# Patient Record
Sex: MALE | Race: WHITE | NOT HISPANIC OR LATINO | Employment: FULL TIME | URBAN - METROPOLITAN AREA
[De-identification: names, ages, dates, MRNs, and addresses within clinical notes are randomized per-mention and may not be internally consistent; named-entity substitution may affect disease eponyms.]

---

## 2018-01-31 ENCOUNTER — ALLIED HEALTH/NURSE VISIT (OUTPATIENT)
Dept: NURSING | Facility: CLINIC | Age: 53
End: 2018-01-31
Payer: COMMERCIAL

## 2018-01-31 DIAGNOSIS — Z23 NEED FOR PROPHYLACTIC VACCINATION AND INOCULATION AGAINST INFLUENZA: Primary | ICD-10-CM

## 2018-01-31 PROCEDURE — 90686 IIV4 VACC NO PRSV 0.5 ML IM: CPT

## 2018-01-31 PROCEDURE — 90471 IMMUNIZATION ADMIN: CPT

## 2018-01-31 NOTE — MR AVS SNAPSHOT
"              After Visit Summary   2018    Michael Mills    MRN: [id number]           Patient Information     Date Of Birth          1965        Visit Information        Provider Department      2018 3:45 PM CR GOLD MA/LPN [facility]        Today's Diagnoses     Need for prophylactic vaccination and inoculation against influenza    -  1       Follow-ups after your visit        Who to contact     If you have questions or need follow up information about today's clinic visit or your schedule please contact [facility] directly at [phone].  Normal or non-critical lab and imaging results will be communicated to you by [employer], letter or phone within 4 business days after the clinic has received the results. If you do not hear from us within 7 days, please contact the clinic through [employer] or phone. If you have a critical or abnormal lab result, we will notify you by phone as soon as possible.  Submit refill requests through [employer] or call your pharmacy and they will forward the refill request to us. Please allow 3 business days for your refill to be completed.          Additional Information About Your Visit        MyChart Information     [employer] lets you send messages to your doctor, view your test results, renew your prescriptions, schedule appointments and more. To sign up, go to [email or web address] . Click on \"Log in\" on the left side of the screen, which will take you to the Welcome page. Then click on \"Sign up Now\" on the right side of the page.     You will be asked to enter the access code listed below, as well as some personal information. Please follow the directions to create your username and password.     Your access code is: [email or web address]  Expires: 2018  4:38 PM     Your access code will  in 90 days. If you need help or a new code, please call your [facility] or [phone].        Care EveryWhere ID     This is your Care " EveryWhere ID. This could be used by other organizations to access your [place] medical records  [id number]         Blood Pressure from Last 3 Encounters:   12/01/16 110/80   02/09/16 122/70   07/06/15 130/79    Weight from Last 3 Encounters:   12/01/16 146 lb (66.2 kg)   02/09/16 151 lb (68.5 kg)   07/06/15 147 lb (66.7 kg)              We Performed the Following     FLU VAC, SPLIT VIRUS IM > 3 YO (QUADRIVALENT) [01999]     Vaccine Administration, Initial [34335]        Primary Care Provider Office Phone # Fax #    Neymar Wesley [phone] [address] [facility] [address]        Equal Access to Services     ION PARKER : Hadjimbo trejoo Sotaina, waaxda luqadaha, qaybta kaalmada adeegyada, jose foreman . So [facility] [phone].    ATENCIÓN: Si habla español, tiene a whitaker disposición servicios gratuitos de asistencia lingüística. Llame al [phone].    We comply with applicable federal civil rights laws and [place] laws. We do not discriminate on the basis of race, color, national origin, age, disability, sex, sexual orientation, or gender identity.            Thank you!     Thank you for choosing [facility]  for your care. Our goal is always to provide you with excellent care. Hearing back from our patients is one way we can continue to improve our services. Please take a few minutes to complete the written survey that you may receive in the mail after your visit with us. Thank you!             Your Updated Medication List - Protect others around you: Learn how to safely use, store and throw away your medicines at www.disposemymeds.org.          This list is accurate as of 1/31/18  4:38 PM.  Always use your most recent med list.                   Brand Name Dispense Instructions for use Diagnosis    omeprazole 20 MG CR capsule    priLOSEC    30 capsule    Take 1 capsule (20 mg) by mouth daily    Gastroesophageal reflux disease,  esophagitis presence not specified

## 2018-01-31 NOTE — PROGRESS NOTES

## 2018-07-18 ENCOUNTER — HOSPITAL ENCOUNTER (EMERGENCY)
Facility: CLINIC | Age: 53
Discharge: HOME | End: 2018-07-18
Attending: EMERGENCY MEDICINE | Admitting: EMERGENCY MEDICINE
Payer: COMMERCIAL

## 2018-07-18 ENCOUNTER — APPOINTMENT (OUTPATIENT)
Dept: GENERAL RADIOLOGY | Facility: CLINIC | Age: 53
End: 2018-07-18
Attending: EMERGENCY MEDICINE
Payer: COMMERCIAL

## 2018-07-18 VITALS
TEMPERATURE: 98.2 F | RESPIRATION RATE: 16 BRPM | OXYGEN SATURATION: 98 % | DIASTOLIC BLOOD PRESSURE: 100 MMHG | SYSTOLIC BLOOD PRESSURE: 138 MMHG | HEART RATE: 68 BPM

## 2018-07-18 DIAGNOSIS — M25.531 RIGHT WRIST PAIN: ICD-10-CM

## 2018-07-18 PROCEDURE — 25000132 ZZH RX MED GY IP 250 OP 250 PS 637: Performed by: EMERGENCY MEDICINE

## 2018-07-18 PROCEDURE — 25000125 ZZHC RX 250: Performed by: EMERGENCY MEDICINE

## 2018-07-18 PROCEDURE — 99283 EMERGENCY DEPT VISIT LOW MDM: CPT

## 2018-07-18 PROCEDURE — 73110 X-RAY EXAM OF WRIST: CPT | Mod: RT

## 2018-07-18 RX ORDER — GABAPENTIN 300 MG/1
300 CAPSULE ORAL 3 TIMES DAILY
Qty: 90 CAPSULE | Refills: 0 | Status: SHIPPED | OUTPATIENT
Start: 2018-07-18 | End: 2018-10-16

## 2018-07-18 RX ORDER — OXYCODONE HYDROCHLORIDE 5 MG/1
5 TABLET ORAL ONCE
Status: COMPLETED | OUTPATIENT
Start: 2018-07-18 | End: 2018-07-18

## 2018-07-18 RX ORDER — GABAPENTIN 300 MG/1
300 CAPSULE ORAL ONCE
Status: COMPLETED | OUTPATIENT
Start: 2018-07-18 | End: 2018-07-18

## 2018-07-18 RX ORDER — OXYCODONE HYDROCHLORIDE 5 MG/1
5 TABLET ORAL EVERY 6 HOURS PRN
Qty: 12 TABLET | Refills: 0 | Status: SHIPPED | OUTPATIENT
Start: 2018-07-18 | End: 2018-10-16

## 2018-07-18 RX ORDER — PREDNISONE 20 MG/1
60 TABLET ORAL ONCE
Status: COMPLETED | OUTPATIENT
Start: 2018-07-18 | End: 2018-07-18

## 2018-07-18 RX ORDER — PREDNISONE 20 MG/1
40 TABLET ORAL DAILY
Qty: 10 TABLET | Refills: 0 | Status: SHIPPED | OUTPATIENT
Start: 2018-07-18 | End: 2021-09-08

## 2018-07-18 RX ORDER — ACETAMINOPHEN 500 MG
1000 TABLET ORAL ONCE
Status: COMPLETED | OUTPATIENT
Start: 2018-07-18 | End: 2018-07-18

## 2018-07-18 RX ORDER — ACETAMINOPHEN 500 MG
500-1000 TABLET ORAL EVERY 8 HOURS PRN
Qty: 1 TABLET | Refills: 0 | Status: SHIPPED | OUTPATIENT
Start: 2018-07-18 | End: 2018-07-28

## 2018-07-18 RX ADMIN — OXYCODONE HYDROCHLORIDE 5 MG: 5 TABLET ORAL at 20:42

## 2018-07-18 RX ADMIN — OXYCODONE HYDROCHLORIDE 5 MG: 5 TABLET ORAL at 21:23

## 2018-07-18 RX ADMIN — ACETAMINOPHEN 1000 MG: 500 TABLET, FILM COATED ORAL at 20:42

## 2018-07-18 RX ADMIN — GABAPENTIN 300 MG: 300 CAPSULE ORAL at 21:29

## 2018-07-18 RX ADMIN — PREDNISONE 60 MG: 20 TABLET ORAL at 20:12

## 2018-07-18 NOTE — LETTER
July 18, 2018      To Whom It May Concern:      Michael Mills was seen in our Emergency Department today, 07/18/18.  I expect his condition to improve over the next 1-2 days.  He may return to work when improved.    Sincerely,        Toya Kramer MD

## 2018-07-18 NOTE — ED AVS SNAPSHOT
[facility] Emergency Department    [address]    [facility] [address]    Phone:  [phone]    Fax:  [phone]                                       Michael Mills   MRN: [id number]    Department:  [facility] Emergency Department   Date of Visit:  7/18/2018           Patient Information     Date Of Birth          1965        Your diagnoses for this visit were:     Right wrist pain likely carpal tunnel syndrome       You were seen by Toya Kramer MD.      Follow-up Information     Follow up with Neymar Wesley MD In 2 days.    Specialty:  Family Practice    Contact information:    [address] [facility] [id number] [phone]          Follow up with [facility] In 1 week.    Contact information:    [address], [facility] [address]  [facility] [address] [phone]          Follow up with Taurus Estevez MD. Call in 1 day.    Specialty:  Internal Medicine    Contact information:    [facility]  [address]  [facility] [address] [phone]          Discharge Instructions       Discharge Instructions  Extremity Injury    You were seen today for an injury to an extremity (arm, hand, leg, or foot). You may have a bruise, strain, or fracture (broken bone).    Generally, every Emergency Department visit should have a follow-up clinic visit with either a primary or a specialty clinic/provider. Please follow-up as instructed by your emergency provider today.  Return to the Emergency Department right away if:    Your pain seems to change or get worse or there is pain in a new area that wasn t evaluated today.    Your extremity becomes pale, cool, blue, or numb or tingling past the injury.    You have more drainage, redness or pain in the area of the cut or abrasion.    You have pain that you cannot control with the medicine recommended or prescribed here, or you have pain that seems too much for your  injury.    Your child (who is injured) will not stop crying or is much more fussy than normal.    You have new symptoms or anything that worries you.    What to Expect:    Your swelling and pain may be worse the day after your injury, but should not be severe and should start getting better after that. You should not have new symptoms and your pain should not get worse.    You may start to get a bruise over the injured area or below the injured area (bruising can follow gravity).    Your movement and strength should get better with time.    Some injuries may not show up until after you have left the Emergency Department so it is important to follow-up as directed.    Your injury may prevent you from working.  Follow-up with your regular provider to get a work release note.    Pain medications or your injury may make it unsafe to drive or operate machinery.    Home Care:    RICE: Rest, Ice, Compression, Elevation  o Rest: Rest your injured area for at least 1-2 days. After that you may start using your extremity again as long as there is not too much pain.   o Ice: Apply ice your injured area for 15 minutes at a time, at least 3 times a day. Use a cloth between the ice bag and your skin to prevent frostbite. Do not sleep with an ice pack or heating pad on, since this can cause burns or skin injury.  o Compression: You may use an elastic bandage (Ace  Wrap) if it makes you more comfortable. Wrap it just tight enough to provide light compression, like a new pair of socks feels. Loosen the bandage if you have swelling past the bandage.  o Elevation: Raise the injured area above the level of your heart as much as possible in the first 1-2 days.      Use Tylenol  (acetaminophen), Motrin (ibuprofen), or Advil  (ibuprofen) for your pain unless you have an allergy or are told not to use these medications by your provider.  Take the medications as instructed on the package. Tylenol  (acetaminophen) is in many prescription  medicines and non-prescription medicines--check all of your medicines to be sure you aren t taking more than 3000 mg per day.    Please follow any other instructions that were discussed with you by your provider.    Stretching/Exercises:  You may have been provided with instructions for stretching or exercises. If your injury was to your arm or shoulder and your provider put you in a sling or an immobilizer, it is important that you take off your immobilizer within 3 days and stretch/move your shoulder, unless your provider specifically tells you to not move your shoulder.  This is to prevent further injury such as a  frozen shoulder .     If you were given a prescription for medicine here today, be sure to read all of the information (including the package insert) that comes with your prescription.  This will include important information about the medicine, its side effects, and any warnings that you need to know about.  The pharmacist who fills the prescription can provide more information and answer questions you may have about the medicine.  If you have questions or concerns that the pharmacist cannot address, please call or return to the Emergency Department.     Remember that you can always come back to the Emergency Department if you are not able to see your regular provider in the amount of time listed above, if you get any new symptoms, or if there is anything that worries you.    Opioid Medication Discharge Instructions    You have been given a prescription for an opioid (narcotic) pain medicine and/or have   received a pain medicine while here in the emergency department. These medicines can make you drowsy or impaired.     You must not drive, operate dangerous equipment, or   engage in any other dangerous activities while taking these medications. If you drive while taking these medications, you could be arrested for DUI, or driving under the   influence. Do not drink any alcohol while you are taking  these medications.     Opioid pain medications can cause addiction. If you have a history of chemical   dependency of any type, you are at a higher risk of becoming addicted to pain   medications. Only take these prescribed medications to treat your pain when all other   options have been tried. Take it for as short a time and as few doses as possible.     Store your pain pills in a secure place, as they are frequently stolen and provide a dangerous opportunity for children or visitors in your house to start abusing these powerful medications. We will not replace any lost or stolen medicine.     As soon as your pain is better, you should safely dispose of all your remaining medication.     Many prescription pain medications contain Tylenol (acetaminophen), including Vicodin, Tylenol #3, Norco, Lortab, and Percocet. You should not take any extra pills of Tylenol if you are using these prescription medications or you can get very sick. Do not ever take more than 4000 mg of acetaminophen in any 24 hour period.    All opioids tend to cause constipation. Drink plenty of water and eat foods that have   a lot of fiber, such as fruits, vegetables, prune juice, apple juice and high fiber cereal.   Take a laxative if you don t move your bowels at least every other day. Miralax, Milk of   Magnesia, Colace, or Senna can be used to keep you regular.        Discharge References/Attachments     CARPAL TUNNEL SYNDROME (ENGLISH)    GABAPENTIN CAPSULES OR TABLETS (ENGLISH)      24 Hour Appointment Hotline       To make an appointment at any [facility], call [phone] ([phone]). If you don't have a family doctor or clinic, we will help you find one. [facility] clinics are conveniently located to serve the needs of you and your family.             Review of your medicines      START taking        Dose / Directions Last dose taken    acetaminophen 500 MG tablet   Commonly known as:  TYLENOL   Dose:  500-1000 mg    Quantity:  1 tablet        Take 1-2 tablets (500-1,000 mg) by mouth every 8 hours as needed for mild pain   Refills:  0        gabapentin 300 MG capsule   Commonly known as:  NEURONTIN   Dose:  300 mg   Quantity:  90 capsule        Take 1 capsule (300 mg) by mouth 3 times daily   Refills:  0        oxyCODONE IR 5 MG tablet   Commonly known as:  ROXICODONE   Dose:  5 mg   Quantity:  12 tablet        Take 1 tablet (5 mg) by mouth every 6 hours as needed for pain   Refills:  0        predniSONE 20 MG tablet   Commonly known as:  DELTASONE   Dose:  40 mg   Quantity:  10 tablet        Take 2 tablets (40 mg) by mouth daily for 5 days   Refills:  0          Our records show that you are taking the medicines listed below. If these are incorrect, please call your family doctor or clinic.        Dose / Directions Last dose taken    omeprazole 20 MG CR capsule   Commonly known as:  priLOSEC   Dose:  20 mg   Quantity:  30 capsule        Take 1 capsule (20 mg) by mouth daily   Refills:  1                Information about OPIOIDS     PRESCRIPTION OPIOIDS: WHAT YOU NEED TO KNOW   We gave you an opioid (narcotic) pain medicine. It is important to manage your pain, but opioids are not always the best choice. You should first try all the other options your care team gave you. Take this medicine for as short a time (and as few doses) as possible.     These medicines have risks:    DO NOT drive when on new or higher doses of pain medicine. These medicines can affect your alertness and reaction times, and you could be arrested for driving under the influence (DUI). If you need to use opioids long-term, talk to your care team about driving.    DO NOT operate heave machinery    DO NOT do any other dangerous activities while taking these medicines.     DO NOT drink any alcohol while taking these medicines.      If the opioid prescribed includes acetaminophen, DO NOT take with any other medicines that contain acetaminophen. Read all  labels carefully. Look for the word  acetaminophen  or  Tylenol.  Ask your pharmacist if you have questions or are unsure.    You can get addicted to pain medicines, especially if you have a history of addiction (chemical, alcohol or substance dependence). Talk to your care team about ways to reduce this risk.    Store your pills in a secure place, locked if possible. We will not replace any lost or stolen medicine. If you don t finish your medicine, please throw away (dispose) as directed by your pharmacist. The [place] Pollution Control Agency has more information about safe disposal: https://www.pca.[facility].mn.us/living-green/managing-unwanted-medications.     All opioids tend to cause constipation. Drink plenty of water and eat foods that have a lot of fiber, such as fruits, vegetables, prune juice, apple juice and high-fiber cereal. Take a laxative (Miralax, milk of magnesia, Colace, Senna) if you don t move your bowels at least every other day.         Prescriptions were sent or printed at these locations (4 Prescriptions)                   Other Prescriptions                Printed at Department/Unit printer (4 of 4)         acetaminophen (TYLENOL) 500 MG tablet               gabapentin (NEURONTIN) 300 MG capsule               oxyCODONE IR (ROXICODONE) 5 MG tablet               predniSONE (DELTASONE) 20 MG tablet                Procedures and tests performed during your visit     XR Wrist Right G/E 3 Views      Orders Needing Specimen Collection     None      Pending Results     No orders found from 7/16/2018 to 7/19/2018.            Pending Culture Results     No orders found from 7/16/2018 to 7/19/2018.            Pending Results Instructions     If you had any lab results that were not finalized at the time of your Discharge, you can call the ED Lab Result RN at [phone]. You will be contacted by this team for any positive Lab results or changes in treatment. The nurses are available 7 days a week from  10A to 6:30P.  You can leave a message 24 hours per day and they will return your call.        Test Results From Your Hospital Stay        7/18/2018  8:35 PM      Narrative     RIGHT WRIST THREE VIEWS   7/18/2018 8:23 PM     HISTORY: Pain.    COMPARISON: 6/26/2014.        Impression     IMPRESSION: No visualized acute fracture, malalignment or other acute  osseous abnormality of the right wrist.    CHELY NARANJO MD                Clinical Quality Measure: Blood Pressure Screening     Your blood pressure was checked while you were in the emergency department today. The last reading we obtained was  BP: (!) 135/94 . Please read the guidelines below about what these numbers mean and what you should do about them.  If your systolic blood pressure (the top number) is less than 120 and your diastolic blood pressure (the bottom number) is less than 80, then your blood pressure is normal. There is nothing more that you need to do about it.  If your systolic blood pressure (the top number) is 120-139 or your diastolic blood pressure (the bottom number) is 80-89, your blood pressure may be higher than it should be. You should have your blood pressure rechecked within a year by a primary care provider.  If your systolic blood pressure (the top number) is 140 or greater or your diastolic blood pressure (the bottom number) is 90 or greater, you may have high blood pressure. High blood pressure is treatable, but if left untreated over time it can put you at risk for heart attack, stroke, or kidney failure. You should have your blood pressure rechecked by a primary care provider within the next 4 weeks.  If your provider in the emergency department today gave you specific instructions to follow-up with your doctor or provider even sooner than that, you should follow that instruction and not wait for up to 4 weeks for your follow-up visit.        Thank you for choosing [facility]       Thank you for choosing [facility] for your care.  "Our goal is always to provide you with excellent care. Hearing back from our patients is one way we can continue to improve our services. Please take a few minutes to complete the written survey that you may receive in the mail after you visit with us. Thank you!        [employer] Information     [employer] lets you send messages to your doctor, view your test results, renew your prescriptions, schedule appointments and more. To sign up, go to [email or web address] . Click on \"Log in\" on the left side of the screen, which will take you to the Welcome page. Then click on \"Sign up Now\" on the right side of the page.     You will be asked to enter the access code listed below, as well as some personal information. Please follow the directions to create your username and password.     Your access code is: [email or web address]  Expires: 10/16/2018  9:36 PM     Your access code will  in 90 days. If you need help or a new code, please call your [place] clinic or [phone].        Care EveryWhere ID     This is your Care EveryWhere ID. This could be used by other organizations to access your [place] medical records  [id number]        Equal Access to Services     ION PARKER AH: Barron Farias, wadaron cohen, qabubba kaalmabhargavi tanner, jose man. So [facility] [phone].    ATENCIÓN: Si habla español, tiene a whitaker disposición servicios gratuitos de asistencia lingüística. Llame al [phone].    We comply with applicable federal civil rights laws and [place] laws. We do not discriminate on the basis of race, color, national origin, age, disability, sex, sexual orientation, or gender identity.            After Visit Summary       This is your record. Keep this with you and show to your community pharmacist(s) and doctor(s) at your next visit.                  "

## 2018-07-18 NOTE — ED AVS SNAPSHOT
[facility] Emergency Department    [address]    [facility] [address]    Phone:  [phone]    Fax:  [phone]                                       Michael Mills   MRN: [id number]    Department:  [facility] Emergency Department   Date of Visit:  7/18/2018           After Visit Summary Signature Page     I have received my discharge instructions, and my questions have been answered. I have discussed any challenges I see with this plan with the nurse or doctor.    ..........................................................................................................................................  Patient/Patient Representative Signature      ..........................................................................................................................................  Patient Representative Print Name and Relationship to Patient    ..................................................               ................................................  Date                                            Time    ..........................................................................................................................................  Reviewed by Signature/Title    ...................................................              ..............................................  Date                                                            Time

## 2018-07-19 NOTE — DISCHARGE INSTRUCTIONS
Discharge Instructions  Extremity Injury    You were seen today for an injury to an extremity (arm, hand, leg, or foot). You may have a bruise, strain, or fracture (broken bone).    Generally, every Emergency Department visit should have a follow-up clinic visit with either a primary or a specialty clinic/provider. Please follow-up as instructed by your emergency provider today.  Return to the Emergency Department right away if:    Your pain seems to change or get worse or there is pain in a new area that wasn t evaluated today.    Your extremity becomes pale, cool, blue, or numb or tingling past the injury.    You have more drainage, redness or pain in the area of the cut or abrasion.    You have pain that you cannot control with the medicine recommended or prescribed here, or you have pain that seems too much for your injury.    Your child (who is injured) will not stop crying or is much more fussy than normal.    You have new symptoms or anything that worries you.    What to Expect:    Your swelling and pain may be worse the day after your injury, but should not be severe and should start getting better after that. You should not have new symptoms and your pain should not get worse.    You may start to get a bruise over the injured area or below the injured area (bruising can follow gravity).    Your movement and strength should get better with time.    Some injuries may not show up until after you have left the Emergency Department so it is important to follow-up as directed.    Your injury may prevent you from working.  Follow-up with your regular provider to get a work release note.    Pain medications or your injury may make it unsafe to drive or operate machinery.    Home Care:    RICE: Rest, Ice, Compression, Elevation  o Rest: Rest your injured area for at least 1-2 days. After that you may start using your extremity again as long as there is not too much pain.   o Ice: Apply ice your injured area for 15  minutes at a time, at least 3 times a day. Use a cloth between the ice bag and your skin to prevent frostbite. Do not sleep with an ice pack or heating pad on, since this can cause burns or skin injury.  o Compression: You may use an elastic bandage (Ace  Wrap) if it makes you more comfortable. Wrap it just tight enough to provide light compression, like a new pair of socks feels. Loosen the bandage if you have swelling past the bandage.  o Elevation: Raise the injured area above the level of your heart as much as possible in the first 1-2 days.      Use Tylenol  (acetaminophen), Motrin (ibuprofen), or Advil  (ibuprofen) for your pain unless you have an allergy or are told not to use these medications by your provider.  Take the medications as instructed on the package. Tylenol  (acetaminophen) is in many prescription medicines and non-prescription medicines--check all of your medicines to be sure you aren t taking more than 3000 mg per day.    Please follow any other instructions that were discussed with you by your provider.    Stretching/Exercises:  You may have been provided with instructions for stretching or exercises. If your injury was to your arm or shoulder and your provider put you in a sling or an immobilizer, it is important that you take off your immobilizer within 3 days and stretch/move your shoulder, unless your provider specifically tells you to not move your shoulder.  This is to prevent further injury such as a  frozen shoulder .     If you were given a prescription for medicine here today, be sure to read all of the information (including the package insert) that comes with your prescription.  This will include important information about the medicine, its side effects, and any warnings that you need to know about.  The pharmacist who fills the prescription can provide more information and answer questions you may have about the medicine.  If you have questions or concerns that the pharmacist  cannot address, please call or return to the Emergency Department.     Remember that you can always come back to the Emergency Department if you are not able to see your regular provider in the amount of time listed above, if you get any new symptoms, or if there is anything that worries you.    Opioid Medication Discharge Instructions    You have been given a prescription for an opioid (narcotic) pain medicine and/or have   received a pain medicine while here in the emergency department. These medicines can make you drowsy or impaired.     You must not drive, operate dangerous equipment, or   engage in any other dangerous activities while taking these medications. If you drive while taking these medications, you could be arrested for DUI, or driving under the   influence. Do not drink any alcohol while you are taking these medications.     Opioid pain medications can cause addiction. If you have a history of chemical   dependency of any type, you are at a higher risk of becoming addicted to pain   medications. Only take these prescribed medications to treat your pain when all other   options have been tried. Take it for as short a time and as few doses as possible.     Store your pain pills in a secure place, as they are frequently stolen and provide a dangerous opportunity for children or visitors in your house to start abusing these powerful medications. We will not replace any lost or stolen medicine.     As soon as your pain is better, you should safely dispose of all your remaining medication.     Many prescription pain medications contain Tylenol (acetaminophen), including Vicodin, Tylenol #3, Norco, Lortab, and Percocet. You should not take any extra pills of Tylenol if you are using these prescription medications or you can get very sick. Do not ever take more than 4000 mg of acetaminophen in any 24 hour period.    All opioids tend to cause constipation. Drink plenty of water and eat foods that have   a lot  of fiber, such as fruits, vegetables, prune juice, apple juice and high fiber cereal.   Take a laxative if you don t move your bowels at least every other day. Miralax, Milk of   Magnesia, Colace, or Senna can be used to keep you regular.

## 2018-07-19 NOTE — ED TRIAGE NOTES
Patient presents with complaints of right wrist pain that started yesterday. No known injury. Hx of arthritis. Patient states that he was seen here for similar pain about two years ago. ABC intact without need for intervention at this time.

## 2018-07-19 NOTE — ED PROVIDER NOTES
History     Chief Complaint:    Wrist Pain      HPI   Michael Mills is a 53 year old male who presents to the ED today for evaluation of wrist pain. The patient states that he began to have some pain to his right wrist that radiated to his fingers starting yesterday after returning to work from a vacation. He points to the volar surface of his wrist as the location of the pain and states that he has pains shooting into his fingers. He notes that the pain progressively got worse throughout the day today, which is why he presents to the ED today. He notes that he was seen here in the ED 2-3 years ago for similar symptoms and was ultimately diagnosed with rheumatoid arthritis and was discharged home with Prednisone. The patient reports that he has been taking Ibuprofen for his pain, and that this has not been helping his symptoms. He denies any recent injury or trauma to his hand. Of note, the patient states that he works a lot with his hand for his job, and had the increasing pain this week after coming back to work after being gone on vacation for a week. He has stopped taking all medications for his rheumatoid arthritis.     Allergies:  No known drug allergies.      Medications:    Prilosec      Past Medical History:    Hepatic Hemangioma   Plantar warts   Rheumatoid arthritis   Varicose veins of the lower extremities     Past Surgical History:    Varicose veins left side     Family History:    Thyroid disease   Hypertension   Cerebrovascular disease     Social History:  Marital Status:    Presents to the ED with son   Tobacco Use: never smoker   Alcohol Use: yes   PCP: Neymar Wesley     Review of Systems   Musculoskeletal:        Positive for right wrist pain.    All other systems reviewed and are negative.      Physical Exam     Patient Vitals for the past 24 hrs:   BP Temp Pulse Heart Rate Resp SpO2   07/18/18 2045 - - - - - 99 %   07/18/18 2044 (!) 135/94 - - - - -   07/18/18 1941 (!) 146/104 98.2   F (36.8  C) 74 74 18 99 %        Physical Exam  Nursing note and vitals reviewed.    Constitutional: Pleasant and well groomed.          HENT:    Eyes: Conjunctivae normal are normal. No scleral icterus.   Cardiovascular: Peripheral pulse with normal rate, regular rhythm. Intact distal pulses.   Pulmonary/Chest: Effort normal    Musculoskeletal: No swelling, warmth, or erythema. Significant tenderness to palpation over the carpal tunnel region. Light touch sensation intact. Distal cap refill intact. Pain over volar wrist with any movement of the wrist. No joint effusion.   Neurological:Alert. Coordination normal.   Skin: Skin is warm and dry.   Psychiatric: Normal mood and affect.      Emergency Department Course   Imaging:  Xray right wrist, 3 views   No visualized acute fracture, malalignment or other acute  osseous abnormality of the right wrist.  Report per radiology.   Radiographic findings were communicated with the patient who voiced understanding of the findings.    Interventions:  (2012) Prednisone 60 mg, PO   (2042) Tylenol 1,000 mg, PO   (2042) Roxicodone 5 mg, PO   (2123) Roxicodone 5 mg, PO    (2129) Neurontin 300 mg, PO       Emergency Department Course:  Nursing notes and vitals reviewed.  (2001) I performed an exam of the patient as documented above.    (2016) I rechecked on the patient and he informed me that he has not been taking his arthritis medication.   The patient was sent for a wrist xray while in the emergency department, findings above.   (2049) I rechecked on the patient and updated them on results.  He stated that the pain was still very severe, particularly the shooting pain to his fingers.   (2221) I rechecked on the patient. He states that he is feeling better and that he is ready to go home at this time.   Findings and plan explained to the patient. Patient discharged home with instructions regarding supportive care, medications, and reasons to return. The importance of close  follow-up was reviewed. The patient was prescribed Tylenol, Neurontin,  Roxicodone, and Prednisone.   Impression & Plan    Medical Decision Making:  Michael Mills is a 53 year old male who presents with two days of right wrist pain. He describes the pain in the volar surface of the wrist shooting into his fingers. Differential diagnosis included but is not limited to carpal tunnel syndrome, rheumatoid arthritis, less likely septic arthritis. He has a history of rheumatoid arthritis but his pain today seems more consistent with carpal tunnel syndrome and neuropathic pain going into the hand. He has no pain at all on the dorsum on the wrist with movement. All of the symptoms are on the volar aspect of the wrist and then extending in to his fingers. Had some difficulty controlling the symptoms, but ultimately he was feeling better. Recommended follow up with hand as well as PCP and rheumatologist as he has been lost to follow up. Will return with new or worsening symptoms, fever, swelling of the joint, redness, but otherwise close follow up. He received standard narcotic precautions. Also prescribed Gabapentin which he will titrate up to three times a day as tolerated.     Diagnosis:    ICD-10-CM    1. Right wrist pain M25.531     likely carpal tunnel syndrome       Disposition:  discharged to home    Discharge Medications:  New Prescriptions    ACETAMINOPHEN (TYLENOL) 500 MG TABLET    Take 1-2 tablets (500-1,000 mg) by mouth every 8 hours as needed for mild pain    GABAPENTIN (NEURONTIN) 300 MG CAPSULE    Take 1 capsule (300 mg) by mouth 3 times daily    OXYCODONE IR (ROXICODONE) 5 MG TABLET    Take 1 tablet (5 mg) by mouth every 6 hours as needed for pain    PREDNISONE (DELTASONE) 20 MG TABLET    Take 2 tablets (40 mg) by mouth daily for 5 days         Blanka CURTIS, ernesto serving as a scribe on 7/18/2018 at 8:01 PM to personally document services performed by Dr. Toya Kramer based on my observations and  the provider's statements to me.    7/18/2018   [facility] EMERGENCY DEPARTMENT       Toya Kramer MD  07/19/18 8040

## 2018-07-24 ENCOUNTER — TRANSFERRED RECORDS (OUTPATIENT)
Dept: HEALTH INFORMATION MANAGEMENT | Facility: CLINIC | Age: 53
End: 2018-07-24

## 2018-08-15 ENCOUNTER — TRANSFERRED RECORDS (OUTPATIENT)
Dept: HEALTH INFORMATION MANAGEMENT | Facility: CLINIC | Age: 53
End: 2018-08-15

## 2018-10-16 ENCOUNTER — OFFICE VISIT (OUTPATIENT)
Dept: FAMILY MEDICINE | Facility: CLINIC | Age: 53
End: 2018-10-16
Payer: COMMERCIAL

## 2018-10-16 VITALS
BODY MASS INDEX: 22.66 KG/M2 | RESPIRATION RATE: 16 BRPM | HEART RATE: 84 BPM | DIASTOLIC BLOOD PRESSURE: 89 MMHG | TEMPERATURE: 98.2 F | SYSTOLIC BLOOD PRESSURE: 129 MMHG | WEIGHT: 153 LBS | HEIGHT: 69 IN

## 2018-10-16 DIAGNOSIS — Z12.11 SPECIAL SCREENING FOR MALIGNANT NEOPLASMS, COLON: ICD-10-CM

## 2018-10-16 DIAGNOSIS — R12 HEARTBURN: ICD-10-CM

## 2018-10-16 DIAGNOSIS — L91.8 SKIN TAG: ICD-10-CM

## 2018-10-16 DIAGNOSIS — G56.03 BILATERAL CARPAL TUNNEL SYNDROME: ICD-10-CM

## 2018-10-16 DIAGNOSIS — M05.79 RHEUMATOID ARTHRITIS INVOLVING MULTIPLE SITES WITH POSITIVE RHEUMATOID FACTOR (H): ICD-10-CM

## 2018-10-16 DIAGNOSIS — I83.893 VARICOSE VEINS OF BOTH LOWER EXTREMITIES WITH COMPLICATIONS: ICD-10-CM

## 2018-10-16 DIAGNOSIS — Z00.00 ENCOUNTER FOR PREVENTIVE HEALTH EXAMINATION: Primary | ICD-10-CM

## 2018-10-16 DIAGNOSIS — Z23 NEED FOR PROPHYLACTIC VACCINATION AND INOCULATION AGAINST INFLUENZA: ICD-10-CM

## 2018-10-16 PROCEDURE — 99396 PREV VISIT EST AGE 40-64: CPT | Mod: 25 | Performed by: FAMILY MEDICINE

## 2018-10-16 PROCEDURE — 80061 LIPID PANEL: CPT | Performed by: FAMILY MEDICINE

## 2018-10-16 PROCEDURE — 90682 RIV4 VACC RECOMBINANT DNA IM: CPT | Performed by: FAMILY MEDICINE

## 2018-10-16 PROCEDURE — 36415 COLL VENOUS BLD VENIPUNCTURE: CPT | Performed by: FAMILY MEDICINE

## 2018-10-16 PROCEDURE — 80053 COMPREHEN METABOLIC PANEL: CPT | Performed by: FAMILY MEDICINE

## 2018-10-16 PROCEDURE — 17110 DESTRUCTION B9 LES UP TO 14: CPT | Performed by: FAMILY MEDICINE

## 2018-10-16 PROCEDURE — 90471 IMMUNIZATION ADMIN: CPT | Performed by: FAMILY MEDICINE

## 2018-10-16 ASSESSMENT — ENCOUNTER SYMPTOMS
COUGH: 0
JOINT SWELLING: 0
DYSURIA: 0
FREQUENCY: 0
PALPITATIONS: 0
SORE THROAT: 0
DIZZINESS: 0
PARESTHESIAS: 0
SHORTNESS OF BREATH: 0
MYALGIAS: 0
DIARRHEA: 0
CHILLS: 0
CONSTIPATION: 0
HEARTBURN: 1
ARTHRALGIAS: 0
NERVOUS/ANXIOUS: 0
WEAKNESS: 0
FEVER: 0
ABDOMINAL PAIN: 0
HEMATOCHEZIA: 0
HEADACHES: 0
NAUSEA: 0
HEMATURIA: 0
EYE PAIN: 0

## 2018-10-16 NOTE — MR AVS SNAPSHOT
After Visit Summary   10/16/2018    Michael Mills    MRN: [id number]           Patient Information     Date Of Birth          1965        Visit Information        Provider Department      10/16/2018 3:00 PM Neymar Wesley MD [facility]        Today's Diagnoses     Encounter for preventive health examination    -  1    Special screening for malignant neoplasms, colon        Rheumatoid arthritis involving multiple sites with positive rheumatoid factor (H)        Skin tag        Bilateral carpal tunnel syndrome        Varicose veins of both lower extremities with complications        Heartburn          Care Instructions      Preventive Health Recommendations  Male Ages 50 - 64    Yearly exam:             See your health care provider every year in order to  o   Review health changes.   o   Discuss preventive care.    o   Review your medicines if your doctor has prescribed any.     Have a cholesterol test every 5 years, or more frequently if you are at risk for high cholesterol/heart disease.     Have a diabetes test (fasting glucose) every three years. If you are at risk for diabetes, you should have this test more often.     Have a colonoscopy at age 50, or have a yearly FIT test (stool test). These exams will check for colon cancer.      Talk with your health care provider about whether or not a prostate cancer screening test (PSA) is right for you.    You should be tested each year for STDs (sexually transmitted diseases), if you re at risk.     Shots: Get a flu shot each year. Get a tetanus shot every 10 years.     Nutrition:    Eat at least 5 servings of fruits and vegetables daily.     Eat whole-grain bread, whole-wheat pasta and brown rice instead of white grains and rice.     Get adequate Calcium and Vitamin D.     Lifestyle    Exercise for at least 150 minutes a week (30 minutes a day, 5 days a week). This will help you control your weight and prevent disease.      Limit alcohol to one drink per day.     No smoking.     Wear sunscreen to prevent skin cancer.     See your dentist every six months for an exam and cleaning.     See your eye doctor every 1 to 2 years.  Light compression          Follow-ups after your visit        Additional Services     GASTROENTEROLOGY ADULT REF PROCEDURE ONLY       Last Lab Result: Creatinine (mg/dL)       Date                     Value                 02/01/2016               0.9              ----------  Body mass index is 22.59 kg/(m^2).     Needed:  No  Language:  English    Patient will be contacted to schedule procedure.     Please be aware that coverage of these services is subject to the terms and limitations of your health insurance plan.  Call member services at your health plan with any benefit or coverage questions.  Any procedures must be performed at a [facility] [facility] facility OR coordinated by your clinic's referral office.    Please bring the following with you to your appointment:    (1) Any X-Rays, CTs or MRIs which have been performed.  Contact the facility where they were done to arrange for  prior to your scheduled appointment.    (2) List of current medications   (3) This referral request   (4) Any documents/labs given to you for this referral                  Who to contact     If you have questions or need follow up information about today's clinic visit or your schedule please contact [facility] directly at [phone].  Normal or non-critical lab and imaging results will be communicated to you by MyChart, letter or phone within 4 business days after the clinic has received the results. If you do not hear from us within 7 days, please contact the clinic through MyChart or phone. If you have a critical or abnormal lab result, we will notify you by phone as soon as possible.  Submit refill requests through [employer] or call your pharmacy and they will forward the refill request to us. Please  "allow 3 business days for your refill to be completed.          Additional Information About Your Visit        Care EveryWhere ID     This is your Care EveryWhere ID. This could be used by other organizations to access your [facility] medical records  [id number]        Your Vitals Were     Pulse Temperature Respirations Height BMI (Body Mass Index)       84 98.2  F (36.8  C) (Oral) 16 5' 9\" (1.753 m) 22.59 kg/m2        Blood Pressure from Last 3 Encounters:   10/16/18 129/89   07/18/18 (!) 138/100   12/01/16 110/80    Weight from Last 3 Encounters:   10/16/18 153 lb (69.4 kg)   12/01/16 146 lb (66.2 kg)   02/09/16 151 lb (68.5 kg)              We Performed the Following     Comprehensive metabolic panel     GASTROENTEROLOGY ADULT REF PROCEDURE ONLY     Lipid panel reflex to direct LDL Non-fasting          Today's Medication Changes          These changes are accurate as of 10/16/18  3:36 PM.  If you have any questions, ask your nurse or doctor.               Start taking these medicines.        Dose/Directions    ranitidine 150 MG tablet   Commonly known as:  ZANTAC   Used for:  Heartburn   Started by:  Neymar Wesley MD        Dose:  150 mg   Take 1 tablet (150 mg) by mouth At Bedtime   Quantity:  90 tablet   Refills:  3         Stop taking these medicines if you haven't already. Please contact your care team if you have questions.     gabapentin 300 MG capsule   Commonly known as:  NEURONTIN   Stopped by:  Neymar Wesley MD           omeprazole 20 MG CR capsule   Commonly known as:  priLOSEC   Stopped by:  Neymar Wesley MD           oxyCODONE IR 5 MG tablet   Commonly known as:  ROXICODONE   Stopped by:  Neymar Wesley MD                Where to get your medicines      These medications were sent to [facility] [address] [facility] [address] AT [facility] [address]  [address] [facility] [address]     Phone:  [phone]     ranitidine 150 MG tablet                Primary " Care Provider Office Phone # Fax #    Neymar Wesley [phone] [address] [address] [facility] [address]        Equal Access to Services     VANDANAAGNES KEITH : Hadii rigoberto antony neilo Sofidencioali, waaxda luqadaha, qaybta kaalmada adealbertyada, jose holland larosannatj donovan. So [facility] [phone].    ATENCIÓN: Si habla español, tiene a whitaker disposición servicios gratuitos de asistencia lingüística. Llame al [phone].    We comply with applicable federal civil rights laws and [place] laws. We do not discriminate on the basis of race, color, national origin, age, disability, sex, sexual orientation, or gender identity.            Thank you!     Thank you for choosing [facility]  for your care. Our goal is always to provide you with excellent care. Hearing back from our patients is one way we can continue to improve our services. Please take a few minutes to complete the written survey that you may receive in the mail after your visit with us. Thank you!             Your Updated Medication List - Protect others around you: Learn how to safely use, store and throw away your medicines at www.disposemymeds.org.          This list is accurate as of 10/16/18  3:36 PM.  Always use your most recent med list.                   Brand Name Dispense Instructions for use Diagnosis    ranitidine 150 MG tablet    ZANTAC    90 tablet    Take 1 tablet (150 mg) by mouth At Bedtime    Heartburn

## 2018-10-16 NOTE — PATIENT INSTRUCTIONS
Preventive Health Recommendations  Male Ages 50 - 64    Yearly exam:             See your health care provider every year in order to  o   Review health changes.   o   Discuss preventive care.    o   Review your medicines if your doctor has prescribed any.     Have a cholesterol test every 5 years, or more frequently if you are at risk for high cholesterol/heart disease.     Have a diabetes test (fasting glucose) every three years. If you are at risk for diabetes, you should have this test more often.     Have a colonoscopy at age 50, or have a yearly FIT test (stool test). These exams will check for colon cancer.      Talk with your health care provider about whether or not a prostate cancer screening test (PSA) is right for you.    You should be tested each year for STDs (sexually transmitted diseases), if you re at risk.     Shots: Get a flu shot each year. Get a tetanus shot every 10 years.     Nutrition:    Eat at least 5 servings of fruits and vegetables daily.     Eat whole-grain bread, whole-wheat pasta and brown rice instead of white grains and rice.     Get adequate Calcium and Vitamin D.     Lifestyle    Exercise for at least 150 minutes a week (30 minutes a day, 5 days a week). This will help you control your weight and prevent disease.     Limit alcohol to one drink per day.     No smoking.     Wear sunscreen to prevent skin cancer.     See your dentist every six months for an exam and cleaning.     See your eye doctor every 1 to 2 years.  Light compression

## 2018-10-16 NOTE — LETTER
October 18, 2018      Michael Mills  [address]  [facility] [address]        Dear ,    We are writing to inform you of your test results.    Tests are all Pretty good.  Cholesterol is borderline.  We can start a medicine now, or you might want to consider that again in 5 years.  Risk increases with age   ANÍBAL JULES     Resulted Orders   Lipid panel reflex to direct LDL Non-fasting   Result Value Ref Range    Cholesterol 206 (H) <200 mg/dL      Comment:      Desirable:       <200 mg/dl    Triglycerides 238 (H) <150 mg/dL      Comment:      Borderline high:  150-199 mg/dl  High:             200-499 mg/dl  Very high:       >499 mg/dl  Non Fasting      HDL Cholesterol 39 (L) >39 mg/dL    LDL Cholesterol Calculated 119 (H) <100 mg/dL      Comment:      Above desirable:  100-129 mg/dl  Borderline High:  130-159 mg/dL  High:             160-189 mg/dL  Very high:       >189 mg/dl      Non HDL Cholesterol 167 (H) <130 mg/dL      Comment:      Above Desirable:  130-159 mg/dl  Borderline high:  160-189 mg/dl  High:             190-219 mg/dl  Very high:       >219 mg/dl     Comprehensive metabolic panel   Result Value Ref Range    Sodium 138 133 - 144 mmol/L    Potassium 4.3 3.4 - 5.3 mmol/L    Chloride 104 94 - 109 mmol/L    Carbon Dioxide 29 20 - 32 mmol/L    Anion Gap 5 3 - 14 mmol/L    Glucose 90 70 - 99 mg/dL      Comment:      Non Fasting    Urea Nitrogen 13 7 - 30 mg/dL    Creatinine 0.99 0.66 - 1.25 mg/dL    GFR Estimate 80 >60 mL/min/1.7m2      Comment:      Non  GFR Calc    GFR Estimate If Black >90 >60 mL/min/1.7m2      Comment:       GFR Calc    Calcium 9.0 8.5 - 10.1 mg/dL    Bilirubin Total 0.6 0.2 - 1.3 mg/dL    Albumin 3.7 3.4 - 5.0 g/dL    Protein Total 7.9 6.8 - 8.8 g/dL    Alkaline Phosphatase 67 40 - 150 U/L    ALT 30 0 - 70 U/L    AST 23 0 - 45 U/L       If you have any questions or concerns, please call the clinic at the number listed above.        Sincerely,        Neymar Wesley MD

## 2018-10-16 NOTE — PROGRESS NOTES
SUBJECTIVE:   CC: Michael Mills is an 53 year old male who presents for preventative health visit.     Physical   Annual:     Getting at least 3 servings of Calcium per day:  Yes    Bi-annual eye exam:  NO    Dental care twice a year:  Yes    Sleep apnea or symptoms of sleep apnea:  None    Diet:  Other    Frequency of exercise:  None    Taking medications regularly:  Yes    Medication side effects:  Other    Additional concerns today:  No    Bilateral carpal tunnel syndrome -Patient seen by ortho and PT, pain when pushing and pulling. Considering carpal tunnel surgery.     Varicose veins of both lower extremities with complications - Patient has tried thigh compression socks in the past and Hx of surgery. Bothersome after working long hours on feet. At moment no therapies. He hopes for cure in a single visit    Heartburn - Patient experience acid reflux after eating spicy foods. Tretaed with omeprazole in the past , benefit not remembered. No current therapies    Today's PHQ-2 Score:   PHQ-2 ( 1999 [employer]) 10/16/2018   Q1: Little interest or pleasure in doing things 0   Q2: Feeling down, depressed or hopeless 0   PHQ-2 Score 0   Q1: Little interest or pleasure in doing things Not at all   Q2: Feeling down, depressed or hopeless Not at all   PHQ-2 Score 0       Abuse: Current or Past(Physical, Sexual or Emotional)- No  Do you feel safe in your environment - Yes    Social History   Substance Use Topics     Smoking status: Never Smoker     Smokeless tobacco: Never Used     Alcohol use Yes      Comment: [id number]  on weekend     Alcohol Use 10/16/2018   If you drink alcohol do you typically have greater than 3 drinks per day OR greater than 7 drinks per week? No   No flowsheet data found.    Last PSA: No results found for: PSA    Reviewed orders with patient. Reviewed health maintenance and updated orders accordingly - Yes      Reviewed and updated as needed this visit by clinical staff  Tobacco  Allergies   Meds         Reviewed and updated as needed this visit by Provider        Past Medical History:   Diagnosis Date     CARDIOVASCULAR SCREENING; LDL GOAL LESS THAN 160 10/31/2010    3.7% 10 yr risk 2016      Hepatic hemangioma      Plantar warts 10/4/2013     Premature ejaculation      RA (rheumatoid arthritis) (H) 2014     SKIN SENSATION DISTURB 2006    rt  hand, cts  rt wrist      Snoring 10/4/2013     Varicose veins of lower extremities with complications 2006     Problem list name updated by automated process. Provider to review       Past Surgical History:   Procedure Laterality Date     varicose vein left side         Family History   Problem Relation Age of Onset     Thyroid Disease Mother      underactive      Hypertension Mother      Cerebrovascular Disease Father      Family History Negative Maternal Grandmother      Myocardial Infarction Maternal Grandfather      Family History Negative Paternal Grandmother      Family History Negative Paternal Grandfather      Family History Negative Sister      1     Family History Negative Brother      1- at age of 37-from cancer ?origin      Cancer - colorectal No family hx of      Prostate Cancer No family hx of    FHX - dad triple bypass 4 years ago  Uncle passed away from heart attack   Social History   Substance Use Topics     Smoking status: Never Smoker     Smokeless tobacco: Never Used     Alcohol use Yes      Comment: [id number]  on weekend       Review of Systems   Constitutional: Negative for chills and fever.   HENT: Negative for congestion, ear pain, hearing loss and sore throat.    Eyes: Positive for visual disturbance. Negative for pain.   Respiratory: Negative for cough and shortness of breath.    Cardiovascular: Negative for chest pain, palpitations and peripheral edema.   Gastrointestinal: Positive for heartburn. Negative for abdominal pain, constipation, diarrhea, hematochezia and nausea.   Genitourinary: Negative for  "discharge, dysuria, frequency, genital sores, hematuria, impotence and urgency.   Musculoskeletal: Negative for arthralgias, joint swelling and myalgias.   Skin: Negative for rash.   Neurological: Negative for dizziness, weakness, headaches and paresthesias.   Psychiatric/Behavioral: Negative for mood changes. The patient is not nervous/anxious.      CONSTITUTIONAL: NEGATIVE for fever, chills, change in weight  HENT: water brash in AM  INTEGUMENTARY/SKIN: POSITIVE for skin tags on L neck, wart on top of head, varicose veins on LE bilaterally L>R   CV: POSITIVE for chest pain, heart burn   male: negative for dysuria, hematuria, decreased urinary stream, erectile dysfunction, urethral discharge, infrequent nocturia   MUSCULOSKELETAL: POSITIVE for hand pain with work      OBJECTIVE:   /89 (BP Location: Left arm, Patient Position: Chair, Cuff Size: Adult Large)  Pulse 84  Temp 98.2  F (36.8  C) (Oral)  Resp 16  Ht 5' 9\" (1.753 m)  Wt 153 lb (69.4 kg)  BMI 22.59 kg/m2    Physical Exam  GENERAL: healthy, alert  EYES: Eyes grossly normal to inspection, PERRL and conjunctivae and sclerae normal  HENT: ear canals and TM's normal, nose and mouth without ulcers or lesions  RESP: lungs clear to auscultation - no rales, rhonchi or wheezes  CV: regular rate and rhythm, normal S1 S2, no S3 or S4, no murmur, click or rub, no peripheral edema and peripheral pulses strong  ABDOMEN: soft, nontender, no hepatosplenomegaly, no masses and bowel sounds normal  MS: no gross musculoskeletal defects noted, no edema Patch ov varicosities Medial L knee/calf, no brawny changes  SKIN: skin tags L neck, wart on head    Cryotherapy applied    Diagnostic Test Results:  No results found for this or any previous visit (from the past 24 hour(s)).    ASSESSMENT/PLAN:   (Z00.00) Encounter for preventive health examination  (primary encounter diagnosis)  Comment: completed  Plan: Lipid panel reflex to direct LDL Non-fasting,         " "Comprehensive metabolic panel    (M05.79) Rheumatoid arthritis involving multiple sites with positive rheumatoid factor (H)  Comment: in remission, recent Rheum visit, no therapies          (Z12.11) Special screening for malignant neoplasms, colon  Comment: refer  Plan: GASTROENTEROLOGY ADULT REF PROCEDURE ONLY         (L91.8) Skin tag  Comment: Cryotherapy applied  Neck and head Care discussed    (G56.03) Bilateral carpal tunnel syndrome  Comment: discussed. Recent ED  Visit reviewed    (I83.893) Varicose veins of both lower extremities with complications  Comment:  Discussed options, necessary visits. He will use use knee high socks, light compression first    (R12) Heartburn  Comment: untreated  Plan: ranitidine (ZANTAC) 150 MG tablet    (Z23) Need for prophylactic vaccination and inoculation against influenza  Comment: administered today   Plan: FLU VACCINE, (RIV4) RECOMBINANT HA  , IM         (FluBlok, egg free) [10477]- >18 YRS (FM         recommended  50-64 YRS), Vaccine         Administration, Initial [44872]           COUNSELING:   Reviewed preventive health counseling, as reflected in patient instructions       Immunizations    Vaccinated for: Influenza          BP Readings from Last 1 Encounters:   10/16/18 129/89     Estimated body mass index is 22.59 kg/(m^2) as calculated from the following:    Height as of this encounter: 5' 9\" (1.753 m).    Weight as of this encounter: 153 lb (69.4 kg).    BP Screening:   Last 3 BP Readings:    BP Readings from Last 3 Encounters:   10/16/18 129/89   07/18/18 (!) 138/100   12/01/16 110/80       The following was recommended to the patient:  Re-screen BP within a year and recommended lifestyle modifications       reports that he has never smoked. He has never used smokeless tobacco.      Counseling Resources:  ATP IV Guidelines  Pooled Cohorts Equation Calculator  FRAX Risk Assessment  ICSI Preventive Guidelines  Dietary Guidelines for Americans, 2010  USDA's " MyPlate  ASA Prophylaxis  Lung CA Screening    Neymar Wesley MD  [facility]  Answers for HPI/ROS submitted by the patient on 10/16/2018   PHQ-2 Score: 0

## 2018-10-17 LAB
ALBUMIN SERPL-MCNC: 3.7 G/DL (ref 3.4–5)
ALP SERPL-CCNC: 67 U/L (ref 40–150)
ALT SERPL W P-5'-P-CCNC: 30 U/L (ref 0–70)
ANION GAP SERPL CALCULATED.3IONS-SCNC: 5 MMOL/L (ref 3–14)
AST SERPL W P-5'-P-CCNC: 23 U/L (ref 0–45)
BILIRUB SERPL-MCNC: 0.6 MG/DL (ref 0.2–1.3)
BUN SERPL-MCNC: 13 MG/DL (ref 7–30)
CALCIUM SERPL-MCNC: 9 MG/DL (ref 8.5–10.1)
CHLORIDE SERPL-SCNC: 104 MMOL/L (ref 94–109)
CHOLEST SERPL-MCNC: 206 MG/DL
CO2 SERPL-SCNC: 29 MMOL/L (ref 20–32)
CREAT SERPL-MCNC: 0.99 MG/DL (ref 0.66–1.25)
GFR SERPL CREATININE-BSD FRML MDRD: 80 ML/MIN/1.7M2
GLUCOSE SERPL-MCNC: 90 MG/DL (ref 70–99)
HDLC SERPL-MCNC: 39 MG/DL
LDLC SERPL CALC-MCNC: 119 MG/DL
NONHDLC SERPL-MCNC: 167 MG/DL
POTASSIUM SERPL-SCNC: 4.3 MMOL/L (ref 3.4–5.3)
PROT SERPL-MCNC: 7.9 G/DL (ref 6.8–8.8)
SODIUM SERPL-SCNC: 138 MMOL/L (ref 133–144)
TRIGL SERPL-MCNC: 238 MG/DL

## 2018-10-18 NOTE — PROGRESS NOTES
Tests are all Pretty good.  Cholesterol is borderline.  We can start a medicine now, or you might want to consider that again in 5 years.  Risk increases with age   ANÍBAL JULES

## 2019-01-10 ENCOUNTER — TRANSFERRED RECORDS (OUTPATIENT)
Dept: HEALTH INFORMATION MANAGEMENT | Facility: CLINIC | Age: 54
End: 2019-01-10

## 2019-03-15 ENCOUNTER — TELEPHONE (OUTPATIENT)
Dept: FAMILY MEDICINE | Facility: CLINIC | Age: 54
End: 2019-03-15

## 2019-03-15 DIAGNOSIS — Z12.11 COLON CANCER SCREENING: Primary | ICD-10-CM

## 2019-03-15 NOTE — TELEPHONE ENCOUNTER
Panel Management Review      Patient has the following on his problem list: None      Composite cancer screening  Chart review shows that this patient is due/due soon for the following Colonoscopy  Summary:    Patient is due/failing the following:   COLONOSCOPY    Action needed:   Patient needs referral/order: colonoscopy    Type of outreach:    panel pool    Questions for provider review:    None                                                                                                                                    Stefania Barrera/RODDY  [place]       Chart routed to Care Team .

## 2019-03-25 ENCOUNTER — TELEPHONE (OUTPATIENT)
Dept: FAMILY MEDICINE | Facility: CLINIC | Age: 54
End: 2019-03-25

## 2019-04-09 ENCOUNTER — OFFICE VISIT (OUTPATIENT)
Dept: FAMILY MEDICINE | Facility: CLINIC | Age: 54
End: 2019-04-09
Payer: COMMERCIAL

## 2019-04-09 ENCOUNTER — ANCILLARY PROCEDURE (OUTPATIENT)
Dept: GENERAL RADIOLOGY | Facility: CLINIC | Age: 54
End: 2019-04-09
Attending: PHYSICIAN ASSISTANT
Payer: COMMERCIAL

## 2019-04-09 VITALS
DIASTOLIC BLOOD PRESSURE: 84 MMHG | WEIGHT: 148.6 LBS | HEART RATE: 82 BPM | HEIGHT: 68 IN | SYSTOLIC BLOOD PRESSURE: 124 MMHG | RESPIRATION RATE: 16 BRPM | OXYGEN SATURATION: 98 % | BODY MASS INDEX: 22.52 KG/M2 | TEMPERATURE: 98.4 F

## 2019-04-09 DIAGNOSIS — M05.79 RHEUMATOID ARTHRITIS INVOLVING MULTIPLE SITES WITH POSITIVE RHEUMATOID FACTOR (H): ICD-10-CM

## 2019-04-09 DIAGNOSIS — M79.642 PAIN OF LEFT HAND: ICD-10-CM

## 2019-04-09 DIAGNOSIS — M79.642 PAIN OF LEFT HAND: Primary | ICD-10-CM

## 2019-04-09 LAB — ERYTHROCYTE [SEDIMENTATION RATE] IN BLOOD BY WESTERGREN METHOD: 11 MM/H (ref 0–20)

## 2019-04-09 PROCEDURE — 36415 COLL VENOUS BLD VENIPUNCTURE: CPT | Performed by: PHYSICIAN ASSISTANT

## 2019-04-09 PROCEDURE — 85652 RBC SED RATE AUTOMATED: CPT | Performed by: PHYSICIAN ASSISTANT

## 2019-04-09 PROCEDURE — 84550 ASSAY OF BLOOD/URIC ACID: CPT | Performed by: PHYSICIAN ASSISTANT

## 2019-04-09 PROCEDURE — 73130 X-RAY EXAM OF HAND: CPT | Mod: LT

## 2019-04-09 PROCEDURE — 86140 C-REACTIVE PROTEIN: CPT | Performed by: PHYSICIAN ASSISTANT

## 2019-04-09 PROCEDURE — 99214 OFFICE O/P EST MOD 30 MIN: CPT | Performed by: PHYSICIAN ASSISTANT

## 2019-04-09 ASSESSMENT — MIFFLIN-ST. JEOR: SCORE: 1498.55

## 2019-04-09 NOTE — PATIENT INSTRUCTIONS
Patient Education     What is Rheumatoid Arthritis?  Rheumatoid arthritis (RA) is a disease that affects the synovium, the lining of the joints. This causes pain, swelling, and stiffness. Left untreated, rheumatoid arthritis may damage joints so badly that they no longer function. This disease appears most often in young-adult to middle-age women.      Rheumatoid arthritis affects the lining (synovium) of the joints, sometimes causing swelling.   What causes RA?  RA is an autoimmune disorder. This means the immune system, which normally protects the body, actually causes harm. In RA, the immune system attacks the joint lining. The reason for this is unknown. Researchers believe it is a combination of genes (what is inherited from parents) and environment (for example, having infections from viruses or bacteria). Also, people who smoke or are overweight have an increased risk of developing RA.  What are the symptoms of RA?  Rheumatoid arthritis can affect most joints. The hands, wrists, elbows, knees, and balls of the feet are common sites. This disease often affects the same joint on both sides of the body. Symptoms may include:    Tender, swollen inflamed joints. They may also look red and feel warm.    Stiff joints. Long periods of rest or using a joint too long or too hard can make stiffness worse. Morning stiffness lasting more than 30 minutes is very common.    Joints that have lost normal shape and motion.    Feeling tired.  How is RA diagnosed?  To diagnose rheumatoid arthritis, your healthcare provider will ask you a lot of questions about your health history and current symptoms. He or she will examine you, paying close attention to your joints. You will also have blood tests and X-rays. Your provider will likely recommend that you see a rheumatologist, an arthritis specialist.  Date Last Reviewed: 6/1/2018 2000-2018 [employer]. [address] [place] [address], [place] [address]. All rights  reserved. This information is not intended as a substitute for professional medical care. Always follow your healthcare professional's instructions.

## 2019-04-09 NOTE — PROGRESS NOTES
SUBJECTIVE:   Michael Mills is a 54 year old male who presents to clinic today for the following   health issues:    Finger Injury    Onset: Ongoing for 2 Months    Description:   Location: 2nd, 3rd, and 4th digits of Left Hand  Character: Sharp and Dull ache    Intensity: 6/10    Progression of Symptoms: same    Accompanying Signs & Symptoms:  Other symptoms: swelling and pain keeping patient up at night    History:   Previous similar pain: no       Precipitating factors:   Trauma or overuse: YES- Trauma-Pt had fallen on those fingers two months ago.     Alleviating factors:  Improved by: nothing    Therapies Tried and outcome: no therapies have been tried./         Additional history: as documented    Reviewed  and updated as needed this visit by clinical staff  Tobacco  Allergies  Meds  Med Hx  Surg Hx  Fam Hx  Soc Hx        Reviewed and updated as needed this visit by Provider  Tobacco  Allergies  Meds  Problems  Med Hx  Surg Hx  Fam Hx         Patient Active Problem List   Diagnosis     Varicose veins of lower extremities with complications     Premature ejaculation     CARDIOVASCULAR SCREENING; LDL GOAL LESS THAN 160     Snoring     Pain in limb     Plantar warts     RA (rheumatoid arthritis) (H)     Past Surgical History:   Procedure Laterality Date     varicose vein left side         Social History     Tobacco Use     Smoking status: Never Smoker     Smokeless tobacco: Never Used   Substance Use Topics     Alcohol use: Yes     Comment: [id number]  on weekend     Family History   Problem Relation Age of Onset     Thyroid Disease Mother         underactive      Hypertension Mother      Cerebrovascular Disease Father      Family History Negative Maternal Grandmother      Myocardial Infarction Maternal Grandfather      Family History Negative Paternal Grandmother      Family History Negative Paternal Grandfather      Family History Negative Sister         1     Family History Negative  "Brother         1- at age of 37-from cancer ?origin      Cancer - colorectal No family hx of      Prostate Cancer No family hx of          Current Outpatient Medications   Medication Sig Dispense Refill     ranitidine (ZANTAC) 150 MG tablet Take 1 tablet (150 mg) by mouth At Bedtime 90 tablet 3     Allergies   Allergen Reactions     No Known Drug Allergies      BP Readings from Last 3 Encounters:   19 124/84   10/16/18 129/89   18 (!) 138/100    Wt Readings from Last 3 Encounters:   19 67.4 kg (148 lb 9.6 oz)   10/16/18 69.4 kg (153 lb)   16 66.2 kg (146 lb)                    ROS:  Constitutional, msk, neuro, cardiovascular, pulmonary, gi and gu systems are negative, except as otherwise noted.    OBJECTIVE:     /84 (BP Location: Right arm, Patient Position: Chair, Cuff Size: Adult Regular)   Pulse 82   Temp 98.4  F (36.9  C) (Oral)   Resp 16   Ht 1.727 m (5' 8\")   Wt 67.4 kg (148 lb 9.6 oz)   SpO2 98%   BMI 22.59 kg/m    Body mass index is 22.59 kg/m .  GENERAL APPEARANCE: alert and no distress  ORTHO: L Wrist Exam: WRIST:  Inspection: no swelling  no effusion  Palpation: Tender: none   Non-tender: distal radius, distal ulna, scaphoid, lunate, triquetrum, hook of hamate, carpals  Range of Motion: flexion:  full, extension:  full, painful  Strength: Flexion:  5/5.   Extension:  5/5.    Special tests: negative Tinel's at carpal tunnel.        L Hand/Finger Exam: Inspection:Metacarpals:  slight swelling, warmth and erythema, Index Finger:  normal, Long Finger:  normal, RIng Finger:  normal, Small Finger:  normal  Tender: Metacarpals:  2nd metacarpal, 3rd metacarpal, 4th metacarpal  Non-tender: Metacarpals:  1st metacarpal, 5th metacarpal  Range of Motion Index Finger, ring finger, long finger:   flexion MCP   decreased, painful, extension MCP   full, flexion PIP   full, extension PIP  full, flexion DIP   full, extension DIP  full,  Strength: abduction and adduction 3/5 " diffusely.          PSYCH: mentation appears normal and affect normal/bright    Diagnostic Test Results:  Xray - left hand 3 views: negative     ASSESSMENT/PLAN:     (M79.592) Pain of left hand  (primary encounter diagnosis)  Comment: unclear of exact cause. Given GALO radiographs obtained to assess for bony changes. This appears negative. Does have history of RA. With erythema and warmth, likely differential. Will obtain esr and crp. If elevated, will consider prednisone and have follow up with his rheumatologist. Has not been on medication for years. Trauma likely the trigger. Uric acid obtained as well. If negative workup, MRI to be considered.   Plan: XR Hand Left G/E 3 Views, ESR: Erythrocyte         sedimentation rate, CRP inflammation, Uric acid            (M05.79) Rheumatoid arthritis involving multiple sites with positive rheumatoid factor (H)  Comment: as above   Plan:     Follow up: as above     Gustavo Martel PA-C  [facility]

## 2019-04-10 LAB
CRP SERPL-MCNC: 6.6 MG/L (ref 0–8)
URATE SERPL-MCNC: 4.4 MG/DL (ref 3.5–7.2)

## 2019-04-11 DIAGNOSIS — I00 ACUTE RHEUMATIC ARTHRITIS: Primary | ICD-10-CM

## 2019-04-11 RX ORDER — PREDNISONE 5 MG/1
TABLET ORAL
Qty: 84 TABLET | Refills: 0 | Status: SHIPPED | OUTPATIENT
Start: 2019-04-11 | End: 2019-06-24

## 2019-06-24 ENCOUNTER — HOSPITAL ENCOUNTER (OUTPATIENT)
Dept: CT IMAGING | Facility: CLINIC | Age: 54
Discharge: HOME | End: 2019-06-24
Attending: FAMILY MEDICINE | Admitting: FAMILY MEDICINE
Payer: COMMERCIAL

## 2019-06-24 ENCOUNTER — OFFICE VISIT (OUTPATIENT)
Dept: FAMILY MEDICINE | Facility: CLINIC | Age: 54
End: 2019-06-24
Payer: COMMERCIAL

## 2019-06-24 VITALS
DIASTOLIC BLOOD PRESSURE: 84 MMHG | BODY MASS INDEX: 22.81 KG/M2 | OXYGEN SATURATION: 96 % | WEIGHT: 150 LBS | SYSTOLIC BLOOD PRESSURE: 122 MMHG | TEMPERATURE: 98.5 F | HEART RATE: 85 BPM

## 2019-06-24 DIAGNOSIS — K21.9 GASTROESOPHAGEAL REFLUX DISEASE, ESOPHAGITIS PRESENCE NOT SPECIFIED: Primary | ICD-10-CM

## 2019-06-24 DIAGNOSIS — R10.31 RLQ ABDOMINAL PAIN: ICD-10-CM

## 2019-06-24 DIAGNOSIS — M77.8 RIGHT SHOULDER TENDONITIS: ICD-10-CM

## 2019-06-24 LAB
ERYTHROCYTE [DISTWIDTH] IN BLOOD BY AUTOMATED COUNT: 12.7 % (ref 10–15)
HCT VFR BLD AUTO: 49.6 % (ref 40–53)
HGB BLD-MCNC: 16.9 G/DL (ref 13.3–17.7)
MCH RBC QN AUTO: 30.8 PG (ref 26.5–33)
MCHC RBC AUTO-ENTMCNC: 34.1 G/DL (ref 31.5–36.5)
MCV RBC AUTO: 90 FL (ref 78–100)
PLATELET # BLD AUTO: 191 10E9/L (ref 150–450)
RBC # BLD AUTO: 5.49 10E12/L (ref 4.4–5.9)
WBC # BLD AUTO: 12.9 10E9/L (ref 4–11)

## 2019-06-24 PROCEDURE — 25000128 H RX IP 250 OP 636: Performed by: FAMILY MEDICINE

## 2019-06-24 PROCEDURE — 36415 COLL VENOUS BLD VENIPUNCTURE: CPT | Performed by: FAMILY MEDICINE

## 2019-06-24 PROCEDURE — 99214 OFFICE O/P EST MOD 30 MIN: CPT | Performed by: FAMILY MEDICINE

## 2019-06-24 PROCEDURE — 74177 CT ABD & PELVIS W/CONTRAST: CPT

## 2019-06-24 PROCEDURE — 85027 COMPLETE CBC AUTOMATED: CPT | Performed by: FAMILY MEDICINE

## 2019-06-24 PROCEDURE — 25000125 ZZHC RX 250: Performed by: FAMILY MEDICINE

## 2019-06-24 RX ORDER — IOPAMIDOL 755 MG/ML
75 INJECTION, SOLUTION INTRAVASCULAR ONCE
Status: COMPLETED | OUTPATIENT
Start: 2019-06-24 | End: 2019-06-24

## 2019-06-24 RX ADMIN — SODIUM CHLORIDE 63 ML: 9 INJECTION, SOLUTION INTRAVENOUS at 19:59

## 2019-06-24 RX ADMIN — IOPAMIDOL 75 ML: 755 INJECTION, SOLUTION INTRAVENOUS at 20:00

## 2019-06-24 NOTE — PROGRESS NOTES
Subjective     Michael Mills is a 54 year old male who presents to clinic today for the following health issues:    He has lower abdominal pain for the last 2 days and it is going to his back.   It feels like he has to have a allen movement and he is bloated.   His last BM was today.   He goes about 3 times per day.   There is no blood in his stool.  It has been slightly loose.  He is feeling chilled but no fever.   He still has his appendix.      Also the right shoulder is hurting for about 1.5 days.   It is shooting pain.   He did not hurt it that he can think of.   It was hard to sleep.   He is taking tylenol for the pain.       History of Present Illness        He eats 0-1 servings of fruits and vegetables daily.He consumes 0 sweetened beverage(s) daily.  He is taking medications regularly.     ABDOMINAL   PAIN     Onset: 2 days    Description:   Character: Sharp  Location: right lower quadrant left lower quadrant  Radiation:  Back    Intensity: 10/10    Progression of Symptoms:  same    Accompanying Signs & Symptoms:  Fever/Chills?: no   Gas/Bloating: YES  Nausea: no   Vomitting: no   Diarrhea?: no   Constipation:no   Dysuria or Hematuria: a little pain    History:   Trauma: no  Previous similar pain: YES - gallstone 30 years ago  Previous tests done: none    Precipitating factors:   Does the pain change with:     Food: YES     BM: YES    Urination: YES    Alleviating factors:      Therapies Tried and outcome: tylenol - doesn't help    LMP:  not applicable     PROBLEMS TO ADD ON...  Right upper arm/shoulder shooting pain started yesterday.    Needs a work release note     Past Medical History:   Diagnosis Date     CARDIOVASCULAR SCREENING; LDL GOAL LESS THAN 160 10/31/2010    3.7% 10 yr risk 2016      Hepatic hemangioma      Plantar warts 10/4/2013     Premature ejaculation      RA (rheumatoid arthritis) (H) 12/31/2014     SKIN SENSATION DISTURB 12/25/2006    rt  hand, cts  rt wrist      Snoring 10/4/2013      Varicose veins of lower extremities with complications 2006     Problem list name updated by automated process. Provider to review       Past Surgical History:   Procedure Laterality Date     varicose vein left side         MEDICATIONS:  Current Outpatient Medications   Medication     ranitidine (ZANTAC) 150 MG tablet     No current facility-administered medications for this visit.        SOCIAL HISTORY:  Social History     Tobacco Use     Smoking status: Never Smoker     Smokeless tobacco: Never Used   Substance Use Topics     Alcohol use: Yes     Comment: [id number]-1-6  on weekend       Family History   Problem Relation Age of Onset     Thyroid Disease Mother         underactive      Hypertension Mother      Cerebrovascular Disease Father      Family History Negative Maternal Grandmother      Myocardial Infarction Maternal Grandfather      Family History Negative Paternal Grandmother      Family History Negative Paternal Grandfather      Family History Negative Sister         1     Family History Negative Brother         1- at age of 37-from cancer ?origin      Cancer - colorectal No family hx of      Prostate Cancer No family hx of               Review of Systems   ROS COMP: Constitutional, HEENT, cardiovascular, pulmonary, gi and gu systems are negative, except as otherwise noted.      Objective    /84 (BP Location: Right arm, Patient Position: Sitting, Cuff Size: Adult Regular)   Pulse 85   Temp 98.5  F (36.9  C) (Oral)   Wt 68 kg (150 lb)   SpO2 96%   BMI 22.81 kg/m    Body mass index is 22.81 kg/m .  Physical Exam   GENERAL: healthy, alert, some moderate distress and over weight  EYES: Eyes grossly normal to inspection, PERRL and conjunctivae and sclerae normal  NECK: no adenopathy, no asymmetry, masses, or scars and thyroid normal to palpation  RESP: lungs clear to auscultation - no rales, rhonchi or wheezes  CV: regular rate and rhythm, normal S1 S2, no S3 or S4, no murmur, click  or rub, no peripheral edema and peripheral pulses strong  ABDOMEN: no organomegaly or masses, bowel sounds normal and tender throughout but more below umbilicus and most in right lower quadrant with some rebound  MS: no gross musculoskeletal defects noted, no edema  SKIN: no suspicious lesions or rashes  NEURO: Normal strength and tone, mentation intact and speech normal  PSYCH: mentation appears normal, affect normal/bright  Right Shoulder exam:  There is no swelling, redness or gross deformity of the shoulder joints.  There is full range of motion with some pain in the midarch.  The empty can sign is NEG.  The impingement sign is +.  The rotater cuff strength is 5/5 with some pain with internal rotation and some with external rotation.      Diagnostic Test Results:  Labs reviewed in [facility]        Assessment:  1. Acute abdominal pain worst in the right lower quadrant but in all of lower abdomin with some rebound - ? appy vs diverticulitis - has not had colonoscopy  2. gastroesophageal reflux disease - zantac did not work very well  3. Right shoulder pain - acute - was lifting but did not feel injury - ? Deltoid tendonitis - tender in this area    Plan:  1. Ice tid for shoulder and tylenol  2. NO NSAIDS right now  3. See [facility] orders for labs  4. Will get abdominal and pelvic CT to rule out appy and diverticulitis  5. Will try omeprazole for gastroesophageal reflux disease   6. Due for physical in 3 months - should have colonoscopy this year  7. If diverticulitis will treat with clear liquid diet for 2 days and follow up in 2 days and  cipro 500 mg every 12 hours and flagyl 500 mg every 8 hours for 10 days

## 2019-06-26 ENCOUNTER — OFFICE VISIT (OUTPATIENT)
Dept: FAMILY MEDICINE | Facility: CLINIC | Age: 54
End: 2019-06-26
Payer: COMMERCIAL

## 2019-06-26 VITALS
RESPIRATION RATE: 12 BRPM | WEIGHT: 150 LBS | SYSTOLIC BLOOD PRESSURE: 120 MMHG | BODY MASS INDEX: 22.81 KG/M2 | DIASTOLIC BLOOD PRESSURE: 79 MMHG | TEMPERATURE: 97.9 F | HEART RATE: 69 BPM | OXYGEN SATURATION: 98 %

## 2019-06-26 DIAGNOSIS — Z12.11 SPECIAL SCREENING FOR MALIGNANT NEOPLASMS, COLON: Primary | ICD-10-CM

## 2019-06-26 PROCEDURE — 99214 OFFICE O/P EST MOD 30 MIN: CPT | Performed by: FAMILY MEDICINE

## 2019-06-26 RX ORDER — METRONIDAZOLE 500 MG/1
500 TABLET ORAL 3 TIMES DAILY
Qty: 30 TABLET | Refills: 0 | COMMUNITY
Start: 2019-06-24 | End: 2019-08-26

## 2019-06-26 RX ORDER — CIPROFLOXACIN 500 MG/1
500 TABLET, FILM COATED ORAL 2 TIMES DAILY
Qty: 20 TABLET | Refills: 0 | COMMUNITY
Start: 2019-06-24 | End: 2019-08-26

## 2019-06-26 NOTE — LETTER
[facility]  [address] [place], [address]  [phone]        June 26, 2019    RE: Michael Mills                                                                                                                                                       [address] [place] [id number]            To Whom It May Concern,   Michael Mills is a patient of mine.   He has been ill and was in to see me on Monday, June 24th.   He will need to be excused from work for that day and Tuesday, June 25th.             Sincerely,    Lucinda Badillo M.D.

## 2019-06-26 NOTE — PROGRESS NOTES
Subjective     Michael Mills is a 54 year old male who presents to clinic today for the following health issues:    He was seen on Monday for abdominal pain and sent to hospital for CT.   Acute diverticulitis was found on the left.   He has had cipro and flagyl for 2 days and is feeling better.   He has also been on liquid diet.   He does not have fever.       His right shoulder continues to hurt and the right pec is hurting too when he moves his shoulder.   He thinks he may have injured it lifting heavy pail.   He needs note for work due to missing on Monday and Tuesday.       HPI   Concern - Recheck Abdominal pain  Onset: X 5 days    Description:     Recheck abdominal pain, lower abdomen.  This has improved with anti biotics.  He has since completed at CT abdomen and would like to review his results.       Intensity: mild    Progression of Symptoms:  improving    Accompanying Signs & Symptoms:  Right shoulder pain that started 6/23/19 has now moved to more of right side of chest.  Denies any SOB, but it is noticeable when breathing, can't lay on that side when sleeping     Previous history of similar problem:   none    Precipitating factors:   Worsened by: none    Alleviating factors:  Improved by: Anti Biotics    Therapies Tried and outcome: Anti Biotics seem to be working.       Past Medical History:   Diagnosis Date     CARDIOVASCULAR SCREENING; LDL GOAL LESS THAN 160 10/31/2010    3.7% 10 yr risk 2016      Hepatic hemangioma      Plantar warts 10/4/2013     Premature ejaculation      RA (rheumatoid arthritis) (H) 12/31/2014     SKIN SENSATION DISTURB 12/25/2006    rt  hand, cts  rt wrist      Snoring 10/4/2013     Varicose veins of lower extremities with complications 12/25/2006     Problem list name updated by automated process. Provider to review       Past Surgical History:   Procedure Laterality Date     varicose vein left side         MEDICATIONS:  Current Outpatient Medications   Medication      ciprofloxacin (CIPRO) 500 MG tablet     metroNIDAZOLE (FLAGYL) 500 MG tablet     omeprazole (PRILOSEC) 20 MG DR capsule     No current facility-administered medications for this visit.        SOCIAL HISTORY:  Social History     Tobacco Use     Smoking status: Never Smoker     Smokeless tobacco: Never Used   Substance Use Topics     Alcohol use: Yes     Comment: [id number]  on weekend       Family History   Problem Relation Age of Onset     Thyroid Disease Mother         underactive      Hypertension Mother      Cerebrovascular Disease Father      Family History Negative Maternal Grandmother      Myocardial Infarction Maternal Grandfather      Family History Negative Paternal Grandmother      Family History Negative Paternal Grandfather      Family History Negative Sister         1     Family History Negative Brother         1- at age of 37-from cancer ?origin      Cancer - colorectal No family hx of      Prostate Cancer No family hx of                     Review of Systems   ROS COMP: Constitutional, HEENT, cardiovascular, pulmonary, gi and gu systems are negative, except as otherwise noted.      Objective    /79 (BP Location: Right arm, Patient Position: Chair, Cuff Size: Adult Regular)   Pulse 69   Temp 97.9  F (36.6  C) (Oral)   Resp 12   Wt 68 kg (150 lb)   SpO2 98%   BMI 22.81 kg/m    Body mass index is 22.81 kg/m .  Physical Exam   GENERAL: healthy, alert and no distress  EYES: Eyes grossly normal to inspection, PERRL and conjunctivae and sclerae normal  HENT: ear canals and TM's normal, nose and mouth without ulcers or lesions  NECK: no adenopathy, no asymmetry, masses, or scars and thyroid normal to palpation  RESP: lungs clear to auscultation - no rales, rhonchi or wheezes  CV: regular rate and rhythm, normal S1 S2, no S3 or S4, no murmur, click or rub, no peripheral edema and peripheral pulses strong  ABDOMEN: soft, nontender, no hepatosplenomegaly, no masses and bowel sounds  normal  MS: no gross musculoskeletal defects noted, no edema  SKIN: no suspicious lesions or rashes  NEURO: Normal strength and tone, mentation intact and speech normal  PSYCH: mentation appears normal, affect normal/bright    Diagnostic Test Results:  Labs reviewed in Epic        Assessment:  1. Diverticulitis - improving  2. Right shoulder tendonitis - ? Worse - maybe due to cipro  3. Will be traveling soon [place] to [facility]      Plan:  1. Refer to travel clinic  2. Finish 8 more days abx  3. No alcohol while on abx  4. May go to regular diet  5. Tylenol for shoulder pain  6. Follow up in 1-2 months for shoulder and if still hurting will get MRI  7. Colonoscopy ordered for 1-2 months from now

## 2019-07-24 ENCOUNTER — OFFICE VISIT (OUTPATIENT)
Dept: FAMILY MEDICINE | Facility: CLINIC | Age: 54
End: 2019-07-24
Payer: COMMERCIAL

## 2019-07-24 VITALS
HEIGHT: 68 IN | TEMPERATURE: 98.3 F | WEIGHT: 149 LBS | OXYGEN SATURATION: 99 % | DIASTOLIC BLOOD PRESSURE: 78 MMHG | BODY MASS INDEX: 22.58 KG/M2 | RESPIRATION RATE: 16 BRPM | SYSTOLIC BLOOD PRESSURE: 117 MMHG | HEART RATE: 88 BPM

## 2019-07-24 DIAGNOSIS — L23.7 CONTACT DERMATITIS DUE TO POISON IVY: Primary | ICD-10-CM

## 2019-07-24 PROCEDURE — 99213 OFFICE O/P EST LOW 20 MIN: CPT | Performed by: PHYSICIAN ASSISTANT

## 2019-07-24 RX ORDER — PREDNISONE 20 MG/1
TABLET ORAL
Qty: 42 TABLET | Refills: 0 | Status: SHIPPED | OUTPATIENT
Start: 2019-07-24 | End: 2019-08-26

## 2019-07-24 RX ORDER — TRIAMCINOLONE ACETONIDE 0.25 MG/G
OINTMENT TOPICAL 3 TIMES DAILY
Qty: 80 G | Refills: 0 | Status: SHIPPED | OUTPATIENT
Start: 2019-07-24 | End: 2019-08-26

## 2019-07-24 ASSESSMENT — MIFFLIN-ST. JEOR: SCORE: 1500.36

## 2019-07-24 NOTE — PROGRESS NOTES
Subjective     Michael Mills is a 54 year old male who presents to clinic today for the following health issues:    HPI   Rash  Onset: 2 days     Description:   Location: Arms, waistline and neck   Character: raised, painful, burning, red  Itching (Pruritis): YES    Progression of Symptoms:  worsening    Accompanying Signs & Symptoms:  Fever: yes chilly yesterday   Body aches or joint pain: no   Sore throat symptoms: no   Recent cold symptoms: no     History:   Previous similar rash: no     Precipitating factors:   Exposure to similar rash: no   New exposures: grasses and trees- was working in garden and pulling weeds- thought this started after brushing up against cucumber   Recent travel: no     Alleviating factors:      Therapies Tried and outcome: shower and benadryl       Patient Active Problem List   Diagnosis     Varicose veins of lower extremities with complications     Premature ejaculation     CARDIOVASCULAR SCREENING; LDL GOAL LESS THAN 160     Snoring     Pain in limb     Plantar warts     RA (rheumatoid arthritis) (H)     Past Surgical History:   Procedure Laterality Date     varicose vein left side         Social History     Tobacco Use     Smoking status: Never Smoker     Smokeless tobacco: Never Used   Substance Use Topics     Alcohol use: Yes     Comment: [id number]-[id number]  on weekend     Family History   Problem Relation Age of Onset     Thyroid Disease Mother         underactive      Hypertension Mother      Cerebrovascular Disease Father      Family History Negative Maternal Grandmother      Myocardial Infarction Maternal Grandfather      Family History Negative Paternal Grandmother      Family History Negative Paternal Grandfather      Family History Negative Sister         1     Family History Negative Brother         1- at age of 37-from cancer ?origin      Cancer - colorectal No family hx of      Prostate Cancer No family hx of          Current Outpatient Medications   Medication Sig  "Dispense Refill     predniSONE (DELTASONE) 20 MG tablet Take 3 pills in the morning for 1 week. Then take 2 pills in the morning for 1 week. Then take 1 pill in the morning for 1 week. 42 tablet 0     triamcinolone (KENALOG) 0.025 % external ointment Apply topically 3 times daily for 14 days 80 g 0     ciprofloxacin (CIPRO) 500 MG tablet Take 1 tablet (500 mg) by mouth 2 times daily 20 tablet 0     metroNIDAZOLE (FLAGYL) 500 MG tablet Take 1 tablet (500 mg) by mouth 3 times daily 30 tablet 0     omeprazole (PRILOSEC) 20 MG DR capsule Take 1 capsule (20 mg) by mouth daily 30 capsule 1     Allergies   Allergen Reactions     No Known Drug Allergies          Reviewed and updated as needed this visit by Provider         Review of Systems   ROS COMP: Constitutional, HEENT, cardiovascular, pulmonary, gi and gu systems are negative, except as otherwise noted.      Objective    /78 (BP Location: Right arm, Patient Position: Chair, Cuff Size: Adult Regular)   Pulse 88   Temp 98.3  F (36.8  C) (Oral)   Resp 16   Ht 1.727 m (5' 8\")   Wt 67.6 kg (149 lb)   SpO2 99%   BMI 22.66 kg/m    Body mass index is 22.66 kg/m .       Physical Exam   GENERAL: healthy, alert and no distress  EYES: Eyes grossly normal to inspection, PERRL and conjunctivae and sclerae normal  MS: no gross musculoskeletal defects noted, no edema  SKIN: Rash consisting of erythema, some urticaria, and some vesicles on bilateral arms and trunk ( worse on flanks). Erythematous base.  NEURO: Normal strength and tone, mentation intact and speech normal  PSYCH: mentation appears normal, affect normal/bright    Diagnostic Test Results:  none         Assessment & Plan     (L23.7) Contact dermatitis due to poison ivy  (primary encounter diagnosis)    Comment: Possible contact with poison ivy given appearance and severity of rash. Could be other irritant. Treat as poison ivy with 3 week taper of prednisone. Continue benadryl and can use topical steroid as " "needed for itchiness on small areas only.    Plan: predniSONE (DELTASONE) 20 MG tablet,         triamcinolone (KENALOG) 0.025 % external         ointment                 Patient Instructions   Take the complete course of the steroid.    Use kenalog ointment 3 times a day as needed for itchiness.    Use Benadryl as needed.          Patient Education     Contact Dermatitis  Contact dermatitis is a skin rash caused by something that touches the skin and makes it irritated and inflamed. Your skin may be red, swollen, dry, and may be cracked. Blisters may form and ooze. The rash will itch.  Contact dermatitis can form on the face and neck, backs of hands, forearms, genitals, and lower legs.  People can get contact dermatitis from lots of sources. These include:    Plants such as poison ivy, oak, or sumac    Chemicals in hair dyes and rinses, soaps, solvents, waxes, fingernail polish, and deodorants     Jewelry or watchbands made of nickel  Contact dermatitis is not passed from person to person.  Talk with your healthcare provider about what may have caused the rash. A type of allergy testing called \"patch testing\" may be used to discover what you are allergic to. You will need to avoid the source of your rash in the future to prevent it from coming back.  Treatment is done to relieve itching and prevent the rash from coming back. The rash should go away in a few days to a few weeks.  Home care  Your healthcare provider may prescribe medicine to relieve swelling and itching. Follow all instructions when using these medicines.  General care:    Avoid anything that heats up your skin, such as hot showers or baths, or direct sunlight. This can make itching worse.    Apply cold compresses to soothe your sores to help relieve your symptoms. Do this for 30 minutes 3 to 4 times a day. You can make a cold compress by soaking a cloth in cold water. Squeeze out excess water. You can add colloidal oatmeal to the water to help reduce " itching. For severe itching in a small area, apply an ice pack wrapped in a thin towel. Do this for 20 minutes 3 to 4 times a day.    You can also try wet dressings. One way to do this is to wear a wet piece of clothing under a dry one. Wear a damp shirt under a dry shirt if your upper body is affected. This can relieve itching and prevent you from scratching the affected area.    You can also help relieve large areas of itching by taking a lukewarm bath with colloidal oatmeal added to the water.    Use hydrocortisone cream for redness and irritation, unless another medicine was prescribed. You can also use benzocaine anesthetic cream or spray. Calamine lotion can also relieve mild symptoms.    Use oral diphenhydramine to help reduce itching. You can buy this antihistamine at drug and grocery stores. It can make you sleepy, so use lower doses during the daytime. Or you can use loratadine. This is an antihistamine that will not make you sleepy. Do not use diphenhydramine if you have glaucoma or have trouble urinating due to an enlarged prostate.    If a plant causes your rash, make sure to wash your skin and the clothes you were wearing when you came into contact with the plant. This is to wash away the plant oils that gave you the rash and prevent more or worse symptoms.    Stay away from the substance or object that causes your symptoms. If you can t avoid it, wear gloves or some other type of protection.  Follow-up care  Follow up with your healthcare provider, or as advised.  When to seek medical advice  Call your healthcare provider right away if any of these occur:    Spreading of the rash to other parts of your body    Severe swelling of your face, eyelids, mouth, throat or tongue    Trouble urinating due to swelling in the genital area    Fever of 100.4 F (38 C) or higher    Redness or swelling that gets worse    Pain that gets worse    Foul-smelling fluid leaking from the skin    Yellow-brown crusts on the  open blisters  Date Last Reviewed: 9/1/2016 2000-2018 [employer], [employer]. [address] [place] [address], [place] [address]. All rights reserved. This information is not intended as a substitute for professional medical care. Always follow your healthcare professional's instructions.               No follow-ups on file.    Juan Carlos Mora PA-C  [facility]

## 2019-07-24 NOTE — PATIENT INSTRUCTIONS
"Take the complete course of the steroid.    Use kenalog ointment 3 times a day as needed for itchiness.    Use Benadryl as needed.          Patient Education     Contact Dermatitis  Contact dermatitis is a skin rash caused by something that touches the skin and makes it irritated and inflamed. Your skin may be red, swollen, dry, and may be cracked. Blisters may form and ooze. The rash will itch.  Contact dermatitis can form on the face and neck, backs of hands, forearms, genitals, and lower legs.  People can get contact dermatitis from lots of sources. These include:    Plants such as poison ivy, oak, or sumac    Chemicals in hair dyes and rinses, soaps, solvents, waxes, fingernail polish, and deodorants     Jewelry or watchbands made of nickel  Contact dermatitis is not passed from person to person.  Talk with your healthcare provider about what may have caused the rash. A type of allergy testing called \"patch testing\" may be used to discover what you are allergic to. You will need to avoid the source of your rash in the future to prevent it from coming back.  Treatment is done to relieve itching and prevent the rash from coming back. The rash should go away in a few days to a few weeks.  Home care  Your healthcare provider may prescribe medicine to relieve swelling and itching. Follow all instructions when using these medicines.  General care:    Avoid anything that heats up your skin, such as hot showers or baths, or direct sunlight. This can make itching worse.    Apply cold compresses to soothe your sores to help relieve your symptoms. Do this for 30 minutes 3 to 4 times a day. You can make a cold compress by soaking a cloth in cold water. Squeeze out excess water. You can add colloidal oatmeal to the water to help reduce itching. For severe itching in a small area, apply an ice pack wrapped in a thin towel. Do this for 20 minutes 3 to 4 times a day.    You can also try wet dressings. One way to do this is to " wear a wet piece of clothing under a dry one. Wear a damp shirt under a dry shirt if your upper body is affected. This can relieve itching and prevent you from scratching the affected area.    You can also help relieve large areas of itching by taking a lukewarm bath with colloidal oatmeal added to the water.    Use hydrocortisone cream for redness and irritation, unless another medicine was prescribed. You can also use benzocaine anesthetic cream or spray. Calamine lotion can also relieve mild symptoms.    Use oral diphenhydramine to help reduce itching. You can buy this antihistamine at drug and grocery stores. It can make you sleepy, so use lower doses during the daytime. Or you can use loratadine. This is an antihistamine that will not make you sleepy. Do not use diphenhydramine if you have glaucoma or have trouble urinating due to an enlarged prostate.    If a plant causes your rash, make sure to wash your skin and the clothes you were wearing when you came into contact with the plant. This is to wash away the plant oils that gave you the rash and prevent more or worse symptoms.    Stay away from the substance or object that causes your symptoms. If you can t avoid it, wear gloves or some other type of protection.  Follow-up care  Follow up with your healthcare provider, or as advised.  When to seek medical advice  Call your healthcare provider right away if any of these occur:    Spreading of the rash to other parts of your body    Severe swelling of your face, eyelids, mouth, throat or tongue    Trouble urinating due to swelling in the genital area    Fever of 100.4 F (38 C) or higher    Redness or swelling that gets worse    Pain that gets worse    Foul-smelling fluid leaking from the skin    Yellow-brown crusts on the open blisters  Date Last Reviewed: 9/1/2016 2000-2018 [employer]. [address] [place] [address], [place] [address]. All rights reserved. This information is not intended as a  substitute for professional medical care. Always follow your healthcare professional's instructions.

## 2019-08-16 ENCOUNTER — HOSPITAL ENCOUNTER (OUTPATIENT)
Facility: CLINIC | Age: 54
Discharge: HOME | End: 2019-08-16
Attending: INTERNAL MEDICINE | Admitting: INTERNAL MEDICINE
Payer: COMMERCIAL

## 2019-08-16 VITALS
DIASTOLIC BLOOD PRESSURE: 94 MMHG | SYSTOLIC BLOOD PRESSURE: 124 MMHG | HEART RATE: 85 BPM | HEIGHT: 68 IN | WEIGHT: 139 LBS | RESPIRATION RATE: 12 BRPM | BODY MASS INDEX: 21.07 KG/M2 | OXYGEN SATURATION: 97 %

## 2019-08-16 LAB — COLONOSCOPY: NORMAL

## 2019-08-16 PROCEDURE — G0500 MOD SEDAT ENDO SERVICE >5YRS: HCPCS | Performed by: INTERNAL MEDICINE

## 2019-08-16 PROCEDURE — 45378 DIAGNOSTIC COLONOSCOPY: CPT | Performed by: INTERNAL MEDICINE

## 2019-08-16 PROCEDURE — G0105 COLORECTAL SCRN; HI RISK IND: HCPCS | Performed by: INTERNAL MEDICINE

## 2019-08-16 PROCEDURE — 25000128 H RX IP 250 OP 636: Performed by: INTERNAL MEDICINE

## 2019-08-16 RX ORDER — ONDANSETRON 2 MG/ML
4 INJECTION INTRAMUSCULAR; INTRAVENOUS
Status: DISCONTINUED | OUTPATIENT
Start: 2019-08-16 | End: 2019-08-16 | Stop reason: HOSPADM

## 2019-08-16 RX ORDER — FENTANYL CITRATE 50 UG/ML
INJECTION, SOLUTION INTRAMUSCULAR; INTRAVENOUS PRN
Status: DISCONTINUED | OUTPATIENT
Start: 2019-08-16 | End: 2019-08-16 | Stop reason: HOSPADM

## 2019-08-16 RX ORDER — LIDOCAINE 40 MG/G
CREAM TOPICAL
Status: DISCONTINUED | OUTPATIENT
Start: 2019-08-16 | End: 2019-08-16 | Stop reason: HOSPADM

## 2019-08-16 ASSESSMENT — MIFFLIN-ST. JEOR: SCORE: 1455

## 2019-08-16 NOTE — DISCHARGE INSTRUCTIONS
DIVERTICULOSIS  You have diverticulosis. This means that small pouches have formed in the wall of the colon (large intestine). Most often, this problem causes no symptoms. But the pouches in the colon are at risk for becoming infected or inflamed. When this happens, the condition is called diverticulitis.  The doctor will talk with you about how to manage your condition. Diet changes may be all that are needed to help control diverticulosis and prevent progression to diverticulitis. If you develop diverticulitis, other treatments will likely be needed.  HOME CARE  Medications: You may be told to take fiber supplements daily. Fiber adds bulk to the stool so that it passes through the colon more easily. Stool softeners may be recommended. You may also be given medications for pain relief. Be sure to take all medications as directed.  General Care:    Eat unprocessed foods that are high in fiber. Whole grains, fruits, and vegetables are good choices.    Drink 6 to 8 glasses of water daily.    Watch for changes in your bowel movements. Tell the doctor if you notice any changes.    Begin an exercise program. Ask your doctor how to get started.    Get plenty of rest and sleep.  FOLLOW UP as advised by the doctor or our staff. Regular visits may be needed to check on your health. Be sure to keep all your appointments.  GET PROMPT MEDICAL ATTENTION if any of the following occurs:    Fever of 100.6 F (38 C) or higher, or as directed by your healthcare provider    Severe cramps in the lower left side of the abdomen or pain that is getting progressively worse.    Tenderness in the lower left side of the abdomen or worsening pain throughout the abdomen    Diarrhea or constipation that does not improve within 24 hours    Nausea and vomiting    Bleeding from the rectum      [id number] [facility], [address] [place] [address], [place] [address]. All rights reserved. This information is not intended as a substitute for professional  medical care. Always follow your healthcare professional's instructions.    Eating a High-Fiber Diet  Fiber is what gives strength and structure to plants. Most grains, beans, vegetables, and fruits contain fiber. Foods rich in fiber are often low in calories and fat, and they fill you up more. They may also reduce your risks for certain health problems. To find out the amount of fiber in canned, packaged, or frozen foods, read the  Nutrition Facts  label. It tells you how much fiber is in a serving.      Types of Fiber and Their Benefits  There are two types of fiber: insoluble and soluble. They both aid digestion and help you maintain a healthy weight.  Insoluble fiber: This is found in whole grains, cereals, certain fruits and vegetables (such as apple skin, corn, and carrots). Insoluble fiber may prevent constipation and reduce the risk of certain types of cancer.   Soluble fiber: This type of fiber is in oats, beans, and certain fruits and vegetables (such as strawberries and peas). Soluble fiber can reduce cholesterol (which may help lower the risk of heart disease), and helps control blood sugar levels.  Look for High-Fiber Foods  Whole-grain breads and cereals: Try to eat 6-8 ounces a day. Include wheat and oat bran cereals, whole-wheat muffins or toast, and corn tortillas in your meals.  Fruits: Try to eat 2 cups a day. Apples, oranges, strawberries, pears, and bananas are good sources. (Note: Fruit juice is low in fiber.)  Vegetables: Try to eat 3 cups a day. Add asparagus, carrots, broccoli, peas, and corn to your meals.  Legumes (beans): One cup of cooked lentils gives you over 15 grams of fiber. Try navy beans, lentils, and chickpeas.  Seeds:  A small handful of seeds gives you about 3 grams of fiber. Try sunflower seeds.    Keep Track of Your Fiber  A healthy diet includes 31 grams of fiber a day if you have a 2,000-calorie diet. Keep track of how much fiber you eat. Start by reading food labels. Then  eat a variety of foods high in fiber. Ask your doctor about supplemental fiber products.            [id number] [employer], [address] [facility], [place] [address]. All rights reserved. This information is not intended as a substitute for professional medical care. Always follow your healthcare professional's instructions.    HIGH FIBER DIET  Fiber is present in all fruits, vegetables, cereals and grains. Fiber passes through the body undigested. A high fiber diet helps food move through the intestinal tract. The added bulk is helpful in preventing constipation. In people with diverticulosis it serves to clean out the pouches along the colon wall while preventing new ones from forming. A high fiber diet also reduces the risk of colon cancer, decreases blood cholesterol and prevents high blood sugar in people with diabetes.    The foods listed below are high in fiber and should be included in your diet. If you are not used to high fiber foods, start with 1 or 2 foods from this list. Every 3-4 days add a new one to your diet until you are eating 4 high fiber foods per day. This should give you 20-35 Gm of fiber/day. It is also important to drink a lot of water when you are on this diet (6-8 glasses a day). Water causes the fiber to swell and increases the benefit.    FOODS HIGH IN DIETARY FIBER:  BREADS: Made with 100% whole wheat flour; zabrina, wheat or rye crackers; tortillas, bran muffins  CEREALS: Whole grain cereal with bran (Chex, Raisin Bran, Corn Bran), oatmeal, rolled oats, granola, wheat flakes, brown rice  NUTS: Any nuts  FRUITS: All fresh fruits along with edible skins, (bananas, citrus fruit, mangoes, pears, prunes, raisins, apples, pineapple, apricot, melon, jams and marmalades), fruit juices (especially prune juice)  VEGETABLES: All types, preferably raw or lightly cooked: especially, celery, eggplant, potatoes, spinach, broccoli, brussel sprouts, winter squash, carrots, cauliflower, soybeans,  lentils, fresh and dried beans of all kinds  OTHER: Popcorn, any spices      [id number] [facility], [address] [place] [address], [place] [address]. All rights reserved. This information is not intended as a substitute for professional medical care. Always follow your healthcare professional's instructions.

## 2019-08-16 NOTE — H&P
Pre-Endoscopy History and Physical     Michael Mills MRN# [id number]   YOB: 1965 Age: 54 year old     Date of Procedure: 8/16/2019  Primary care provider: Neymar Wesley  Type of Endoscopy: Colonoscopy with possible biopsy, possible polypectomy  Reason for Procedure: screen  Type of Anesthesia Anticipated: Conscious Sedation    HPI:    Michael is a 54 year old male who will be undergoing the above procedure.      A history and physical has been performed. The patient's medications and allergies have been reviewed. The risks and benefits of the procedure and the sedation options and risks were discussed with the patient.  All questions were answered and informed consent was obtained.      He denies a personal or family history of anesthesia complications or bleeding disorders.     Patient Active Problem List   Diagnosis     Varicose veins of lower extremities with complications     Premature ejaculation     CARDIOVASCULAR SCREENING; LDL GOAL LESS THAN 160     Snoring     Pain in limb     Plantar warts     RA (rheumatoid arthritis) (H)        Past Medical History:   Diagnosis Date     CARDIOVASCULAR SCREENING; LDL GOAL LESS THAN 160 10/31/2010    3.7% 10 yr risk 2016      Hepatic hemangioma      Plantar warts 10/4/2013     Premature ejaculation      RA (rheumatoid arthritis) (H) 12/31/2014     SKIN SENSATION DISTURB 12/25/2006    rt  hand, cts  rt wrist      Snoring 10/4/2013     Varicose veins of lower extremities with complications 12/25/2006     Problem list name updated by automated process. Provider to review        Past Surgical History:   Procedure Laterality Date     varicose vein left side         Social History     Tobacco Use     Smoking status: Never Smoker     Smokeless tobacco: Never Used   Substance Use Topics     Alcohol use: Yes     Comment: [id number]  on weekend       Family History   Problem Relation Age of Onset     Thyroid Disease Mother         underactive      Hypertension  "Mother      Cerebrovascular Disease Father      Family History Negative Maternal Grandmother      Myocardial Infarction Maternal Grandfather      Family History Negative Paternal Grandmother      Family History Negative Paternal Grandfather      Family History Negative Sister         1     Family History Negative Brother         1- at age of 37-from cancer ?origin      Cancer - colorectal No family hx of      Prostate Cancer No family hx of        Prior to Admission medications    Medication Sig Start Date End Date Taking? Authorizing Provider   ciprofloxacin (CIPRO) 500 MG tablet Take 1 tablet (500 mg) by mouth 2 times daily 19   Lucinda Badillo MD   metroNIDAZOLE (FLAGYL) 500 MG tablet Take 1 tablet (500 mg) by mouth 3 times daily 19   Lucinda Badillo MD   omeprazole (PRILOSEC) 20 MG DR capsule Take 1 capsule (20 mg) by mouth daily 19   Lucinda Badillo MD   predniSONE (DELTASONE) 20 MG tablet Take 3 pills in the morning for 1 week. Then take 2 pills in the morning for 1 week. Then take 1 pill in the morning for 1 week. 19   Juan Carlos Mora PA-C   triamcinolone (KENALOG) 0.025 % external ointment Apply topically 3 times daily for 14 days 19  Juan Carlos Mora PA-C       Allergies   Allergen Reactions     No Known Drug Allergies         REVIEW OF SYSTEMS:   5 point ROS negative except as noted above in HPI, including Gen., Resp., CV, GI &  system review.    PHYSICAL EXAM:   There were no vitals taken for this visit. Estimated body mass index is 22.66 kg/m  as calculated from the following:    Height as of 19: 1.727 m (5' 8\").    Weight as of 19: 67.6 kg (149 lb).   GENERAL APPEARANCE: alert, and oriented  MENTAL STATUS: alert  AIRWAY EXAM: Mallampatti Class I (visualization of the soft palate, fauces, uvula, anterior and posterior pillars)  RESP: lungs clear to auscultation - no rales, rhonchi or wheezes  CV: regular rates and rhythm  DIAGNOSTICS:    Not " indicated    IMPRESSION   ASA Class 2 - Mild systemic disease    PLAN:   Plan for Colonoscopy with possible biopsy, possible polypectomy. We discussed the risks, benefits and alternatives and the patient wished to proceed.    The above has been forwarded to the consulting provider.      Signed Electronically by: Esa Malin  August 16, 2019

## 2019-08-16 NOTE — LETTER
July 25, 2019      Michael Rodriguezsangeetarudolph   [address] [place] [id number]        Thank you for choosing [facility]. You are scheduled for the following service(s).   Please be aware that coverage of these services is subject to the terms and limitations of your health insurance plan.  Call member services at your health plan with any benefit or coverage questions.    Date:  Friday August 16, 2019             Procedure:  COLONOSCOPY  Doctor:        Dr Malin   Arrival Time:  0800  *Check in at Emergency/Endoscopy desk*  Procedure Time:  0830      Location:   [facility]        Endoscopy Department, First Floor (Enter through ER Doors) *        [address] [place], [place] [address]      [phone] or [phone] ([facility]) to reschedule      MIRALAX -GATORADE  PREP  Colonoscopy is the most accurate test to detect colon polyps and colon cancer; and the only test where polyps can be removed. During this procedure, a doctor examines the lining of your large intestine and rectum through a flexible tube.   Transportation  You must arrange for a ride for the day of your procedure with a responsible adult. A taxi , Uber, etc, is not an option unless you are accompanied by a responsible adult. If you fail to arrange transportation with a responsible adult, your procedure will be cancelled and rescheduled.    Purchase the  following supplies at your local pharmacy:  - 2 (two) bisacodyl tablets: each tablet contains 5 mg.  (Dulcolax  laxative NOT Dulcolax  stool softener)   - 1 (one) 8.3 oz bottle of Polyethylene Glycol (PEG) 3350 Powder   (MiraLAX , Smooth LAX , ClearLAX  or equivalent)  - 64 oz Gatorade    Regular Gatorade, Gatorade G2 , Powerade , Powerade Zero  or Pedialyte  is acceptable. Red colored flavors are not allowed; all other colors (yellow, green, orange, purple and blue) are okay. It is also okay to buy two 2.12 oz packets of powdered Gatorade that can  be mixed with water to a total volume of 64 oz of liquid.  - 1 (one) 10 oz bottle of Magnesium Citrate (Red colored flavors are not allowed)  It is also okay for you to use a 0.5 oz package of powdered magnesium citrate (17 g) mixed with 10 oz of water.      PREPARATION FOR COLONOSCOPY    7 days before:    Discontinue fiber supplements and medications containing iron. This includes Metamucil  and Fibercon ; and multivitamins with iron.    3 days before:    Begin a low-fiber diet. A low-fiber diet helps making the cleanout more effective.     Examples of a low-fiber diet include (but are not limited to): white bread, white rice, pasta, crackers, fish, chicken, eggs, ground beef, creamy peanut butter, cooked/steamed/boiled vegetables, canned fruit, bananas, melons, milk, plain yogurt cheese, salad dressing and other condiments.     The following are not allowed on a low-fiber diet: seeds, nuts, popcorn, bran, whole wheat, corn, quinoa, raw fruits and vegetables, berries and dried fruit, beans and lentils.    For additional details on low-fiber diet, please refer to the table on the last page.    2 days before:    Continue the low-fiber diet.     Drink at least 8 glasses of water throughout the day.     Stop eating solid foods at 11:45 pm.  1.   2. 1 day before:    In the morning: begin a clear liquid diet (liquids you can see through).     Examples of a clear liquid diet include: water, clear broth or bouillon, Gatorade, Pedialyte or Powerade, carbonated and non-carbonated soft drinks (Sprite , 7-Up , ginger ale), strained fruit juices without pulp (apple, white grape, white cranberry), Jell-O  and popsicles.     The following are not allowed on a clear liquid diet: red liquids, alcoholic beverages, dairy products (milk, creamer, and yogurt), protein shakes, creamy broths, juice with pulp and chewing tobacco.    At noon: take 2 (two) bisacodyl tablets     At 4 (and no later than 6pm): start drinking the  Miralax-Gatorade preparation (8.3 oz of Miralax mixed with 64 oz of Gatorade in a large pitcher). Drink 1(one) 8 oz glass every 15 minutes thereafter, until the mixture is gone.    COLON CLEANSING TIPS: drink adequate amounts of fluids before and after your colon cleansing to prevent dehydration. Stay near a toilet because you will have diarrhea. Even if you are sitting on the toilet, continue to drink the cleansing solution every 15 minutes. If you feel nauseous or vomit, rinse your mouth with water, take a 15 to 30-minute-break and then continue drinking the solution. You will be uncomfortable until the stool has flushed from your colon (in about 2 to 4 hours). You may feel chilled.    Day of your procedure  You may take all of your morning medications including blood pressure medications, blood thinners (if you have not been instructed to stop these by our office), methadone, anti-seizure medications with sips of water 3 hours prior to your procedure or earlier. Do not take insulin or vitamins prior to your procedure. Continue the clear liquid diet.       4 hours prior: drink 10 oz of magnesium citrate. It may be easier to drink it with a straw.    STOP consuming all liquids after that.     Do not take anything by mouth during this time.     Allow extra time to travel to your procedure as you may need to stop and use a restroom along the way.    You are ready for the procedure, if you followed all instructions and your stool is no longer formed, but clear or yellow liquid. If you are unsure whether your colon is clean, please call our office at [phone] before you leave for your appointment.    Bring the following to your procedure:  - Insurance Card/Photo ID.   - List of current medications including over-the-counter medications and supplements.   - Your rescue inhaler if you currently use one to control asthma.      Canceling or rescheduling your appointment:   If you must cancel or reschedule your  appointment, please call [phone] as soon as possible.      COLONOSCOPY PRE-PROCEDURE CHECKLIST    If you have diabetes, ask your regular doctor for diet and medication restrictions.  If you take an anticoagulant or anti-platelet medication (such as Coumadin , Lovenox , Pradaxa , Xarelto , Eliquis , etc.), please call your primary doctor for advice on holding this medication.  If you take aspirin you may continue to do so.  If you are or may be pregnant, please discuss the risks and benefits of this procedure with your doctor.        What happens during a colonoscopy?    Plan to spend up to two hours, starting at registration time, at the endoscopy center the day of your procedure. The colonoscopy takes an average of 15 to 30 minutes. Recovery time is about 30 minutes.      Before the exam:    You will change into a gown.    Your medical history and medication list will be reviewed with you, unless that has been done over the phone prior to the procedure.     A nurse will insert an intravenous (IV) line into your hand or arm.    The doctor will meet with you and will give you a consent form to sign.  1. During the exam:     Medicine will be given through the IV line to help you relax.     Your heart rate and oxygen levels will be monitored. If your blood pressure is low, you may be given fluids through the IV line.     The doctor will insert a flexible hollow tube, called a colonoscope, into your rectum. The scope will be advanced slowly through the large intestine (colon).    You may have a feeling of fullness or pressure.     If an abnormal tissue or a polyp is found, the doctor may remove it through the endoscope for closer examination, or biopsy. Tissue removal is painless    After the exam:           Any tissue samples removed during the exam will be sent to a lab for evaluation. It may take 5-7 working days for you to be notified of the results.     A nurse will provide you with complete discharge  instructions before you leave the endoscopy center. Be sure to ask the nurse for specific instructions if you take blood thinners such as Aspirin, Coumadin or Plavix.     The doctor will prepare a full report for you and for the physician who referred you for the procedure.     Your doctor will talk with you about the initial results of your exam.      Medication given during the exam will prohibit you from driving for the rest of the day.     Following the exam, you may resume your normal diet. Your first meal should be light, no greasy foods. Avoid alcohol until the next day.     You may resume your regular activities the day after the procedure.         LOW-FIBER DIET    Foods RECOMMENDED Foods to AVOID   Breads, Cereal, Rice and Pasta:   White bread, rolls, biscuits, croissant and doug toast.   Waffles, Sinhala toast and pancakes.   White rice, noodles, pasta, macaroni and peeled cooked potatoes.   Plain crackers and saltines.   Cooked cereals: farina, cream of rice.   Cold cereals: Puffed Rice , Rice Krispies , Corn Flakes  and Special K    Breads, Cereal, Rice and Pasta:   Breads or rolls with nuts, seeds or fruit.   Whole wheat, pumpernickel, rye breads and cornbread.   Potatoes with skin, brown or wild rice, and kasha (buckwheat).     Vegetables:   Tender cooked and canned vegetables without seeds: carrots, asparagus tips, green or wax beans, pumpkin, spinach, lima beans. Vegetables:   Raw or steamed vegetables.   Vegetables with seeds.   Sauerkraut.   Winter squash, peas, broccoli, Brussel sprouts, cabbage, onions, cauliflower, baked beans, peas and corn.   Fruits:   Strained fruit juice.   Canned fruit, except pineapple.   Ripe bananas and melon. Fruits:   Prunes and prune juice.   Raw fruits.   Dried fruits: figs, dates and raisins.   Milk/Dairy:   Milk: plain or flavored.   Yogurt, custard and ice cream.   Cheese and cottage cheese Milk/Dairy:     Meat and other proteins:   ground, well-cooked tender  beef, lamb, ham, veal, pork, fish, poultry and organ meats.   Eggs.   Peanut butter without nuts. Meat and other proteins:   Tough, fibrous meats with gristle.   Dry beans, peas and lentils.   Peanut butter with nuts.   Tofu.   Fats, Snack, Sweets, Condiments and Beverages:   Margarine, butter, oils, mayonnaise, sour cream and salad dressing, plain gravy.   Sugar, hard candy, clear jelly, honey and syrup.   Spices, cooked herbs, bouillon, broth and soups made with allowed vegetable, ketchup and mustard.   Coffee, tea and carbonated drinks.   Plain cakes, cookies and pretzels.   Gelatin, plain puddings, custard, ice cream, sherbet and popsicles. Fats, Snack, Sweets, Condiments and Beverages:   Nuts, seeds and coconut.   Jam, marmalade and preserves.   Pickles, olives, relish and horseradish.   All desserts containing nuts, seeds, dried fruit and coconut; or made from whole grains or bran.   Candy made with nuts or seeds.   Popcorn.                     DIRECTIONS TO THE ENDOSCOPY DEPARTMENT     From the north ([facility])  Take [address] South, exit on [address] [address]. Get into the left hand ara, turn left (east), go one-half mile to [address] and turn left. Go north to the first stoplight, take a right on [address] and follow it to the Emergency entrance.    From the south ([facility])  Take 35N to the [address]E split and exit on [address] [address]. On [address] [address], turn left (west) to [address]. Turn right (north) on [address]. Go north to the first stoplight, take a right on [address] and follow it to the Emergency entrance.    From the east via 35E ([facility])  Take [address] south to [address] [address] exit. Turn right on [address] [address]. Go west to [address]. Turn right (north) on [address]. Go to the first stoplight, take a right and follow on [address] to the Emergency entrance.    From the east via [address] ([facility])  [address]  to [address]. Turn left (south) on [address] to [address]. Turn left (east) on [address] and follow it to the Emergency entrance.    From the west via [address] ([place], [place])  Take [address] east to [address]. Turn right (south) on [address] to [address]. Turn left (east) on [address] and follow it to the Emergency entrance.

## 2019-08-26 ENCOUNTER — ANCILLARY PROCEDURE (OUTPATIENT)
Dept: GENERAL RADIOLOGY | Facility: CLINIC | Age: 54
End: 2019-08-26
Attending: FAMILY MEDICINE
Payer: COMMERCIAL

## 2019-08-26 ENCOUNTER — OFFICE VISIT (OUTPATIENT)
Dept: FAMILY MEDICINE | Facility: CLINIC | Age: 54
End: 2019-08-26
Payer: COMMERCIAL

## 2019-08-26 VITALS
HEIGHT: 68 IN | TEMPERATURE: 98.5 F | RESPIRATION RATE: 16 BRPM | HEART RATE: 68 BPM | WEIGHT: 150 LBS | DIASTOLIC BLOOD PRESSURE: 87 MMHG | SYSTOLIC BLOOD PRESSURE: 130 MMHG | BODY MASS INDEX: 22.73 KG/M2 | OXYGEN SATURATION: 99 %

## 2019-08-26 DIAGNOSIS — Z01.818 PREOP GENERAL PHYSICAL EXAM: ICD-10-CM

## 2019-08-26 DIAGNOSIS — Z01.818 PREOP GENERAL PHYSICAL EXAM: Primary | ICD-10-CM

## 2019-08-26 LAB
ERYTHROCYTE [DISTWIDTH] IN BLOOD BY AUTOMATED COUNT: 13.7 % (ref 10–15)
HCT VFR BLD AUTO: 46.5 % (ref 40–53)
HGB BLD-MCNC: 16 G/DL (ref 13.3–17.7)
MCH RBC QN AUTO: 30.5 PG (ref 26.5–33)
MCHC RBC AUTO-ENTMCNC: 34.4 G/DL (ref 31.5–36.5)
MCV RBC AUTO: 89 FL (ref 78–100)
PLATELET # BLD AUTO: 190 10E9/L (ref 150–450)
RBC # BLD AUTO: 5.25 10E12/L (ref 4.4–5.9)
WBC # BLD AUTO: 8.3 10E9/L (ref 4–11)

## 2019-08-26 PROCEDURE — 36415 COLL VENOUS BLD VENIPUNCTURE: CPT | Performed by: FAMILY MEDICINE

## 2019-08-26 PROCEDURE — 85027 COMPLETE CBC AUTOMATED: CPT | Performed by: FAMILY MEDICINE

## 2019-08-26 PROCEDURE — 87389 HIV-1 AG W/HIV-1&-2 AB AG IA: CPT | Performed by: FAMILY MEDICINE

## 2019-08-26 PROCEDURE — 93000 ELECTROCARDIOGRAM COMPLETE: CPT | Performed by: FAMILY MEDICINE

## 2019-08-26 PROCEDURE — 80053 COMPREHEN METABOLIC PANEL: CPT | Performed by: FAMILY MEDICINE

## 2019-08-26 PROCEDURE — 71046 X-RAY EXAM CHEST 2 VIEWS: CPT

## 2019-08-26 PROCEDURE — 99214 OFFICE O/P EST MOD 30 MIN: CPT | Performed by: FAMILY MEDICINE

## 2019-08-26 SDOH — SOCIAL STABILITY: SOCIAL NETWORK
DO YOU BELONG TO ANY CLUBS OR ORGANIZATIONS SUCH AS CHURCH GROUPS UNIONS, FRATERNAL OR ATHLETIC GROUPS, OR SCHOOL GROUPS?: NO

## 2019-08-26 SDOH — SOCIAL STABILITY: SOCIAL NETWORK: IN A TYPICAL WEEK, HOW MANY TIMES DO YOU TALK ON THE PHONE WITH FAMILY, FRIENDS, OR NEIGHBORS?: ONCE A WEEK

## 2019-08-26 SDOH — SOCIAL STABILITY: SOCIAL NETWORK: ARE YOU MARRIED, WIDOWED, DIVORCED, SEPARATED, NEVER MARRIED, OR LIVING WITH A PARTNER?: MARRIED

## 2019-08-26 SDOH — SOCIAL STABILITY: SOCIAL NETWORK: HOW OFTEN DO YOU ATTEND CHURCH OR RELIGIOUS SERVICES?: NEVER

## 2019-08-26 SDOH — HEALTH STABILITY: MENTAL HEALTH
STRESS IS WHEN SOMEONE FEELS TENSE, NERVOUS, ANXIOUS, OR CAN'T SLEEP AT NIGHT BECAUSE THEIR MIND IS TROUBLED. HOW STRESSED ARE YOU?: NOT AT ALL

## 2019-08-26 SDOH — ECONOMIC STABILITY: TRANSPORTATION INSECURITY
IN THE PAST 12 MONTHS, HAS THE LACK OF TRANSPORTATION KEPT YOU FROM MEDICAL APPOINTMENTS OR FROM GETTING MEDICATIONS?: NO

## 2019-08-26 SDOH — SOCIAL STABILITY: SOCIAL NETWORK: HOW OFTEN DO YOU ATTENT MEETINGS OF THE CLUB OR ORGANIZATION YOU BELONG TO?: NEVER

## 2019-08-26 SDOH — SOCIAL STABILITY: SOCIAL NETWORK: HOW OFTEN DO YOU GET TOGETHER WITH FRIENDS OR RELATIVES?: ONCE A WEEK

## 2019-08-26 SDOH — ECONOMIC STABILITY: TRANSPORTATION INSECURITY
IN THE PAST 12 MONTHS, HAS LACK OF TRANSPORTATION KEPT YOU FROM MEETINGS, WORK, OR FROM GETTING THINGS NEEDED FOR DAILY LIVING?: NO

## 2019-08-26 ASSESSMENT — MIFFLIN-ST. JEOR: SCORE: 1504.9

## 2019-08-26 NOTE — LETTER
August 28, 2019      Michael Mills   [address] [place] [id number]        Dear ,    We are writing to inform you of your test results.    Your test results fall within the expected range(s) or remain unchanged from previous results.  Please continue with current treatment plan.    Resulted Orders   HIV Screening   Result Value Ref Range    HIV Antigen Antibody Combo Nonreactive NR^Nonreactive          Comment:      HIV-1 p24 Ag & HIV-1/HIV-2 Ab Not Detected   CBC with platelets   Result Value Ref Range    WBC 8.3 4.0 - 11.0 10e9/L    RBC Count 5.25 4.4 - 5.9 10e12/L    Hemoglobin 16.0 13.3 - 17.7 g/dL    Hematocrit 46.5 40.0 - 53.0 %    MCV 89 78 - 100 fl    MCH 30.5 26.5 - 33.0 pg    MCHC 34.4 31.5 - 36.5 g/dL    RDW 13.7 10.0 - 15.0 %    Platelet Count 190 150 - 450 10e9/L   Comprehensive metabolic panel   Result Value Ref Range    Sodium 139 133 - 144 mmol/L    Potassium 4.4 3.4 - 5.3 mmol/L    Chloride 105 94 - 109 mmol/L    Carbon Dioxide 27 20 - 32 mmol/L    Anion Gap 7 3 - 14 mmol/L    Glucose 107 (H) 70 - 99 mg/dL    Urea Nitrogen 10 7 - 30 mg/dL    Creatinine 0.80 0.66 - 1.25 mg/dL    GFR Estimate >90 >60 mL/min/[1.73_m2]      Comment:      Non  GFR Calc  Starting 12/18/2018, serum creatinine based estimated GFR (eGFR) will be   calculated using the Chronic Kidney Disease Epidemiology Collaboration   (CKD-EPI) equation.      GFR Estimate If Black >90 >60 mL/min/[1.73_m2]      Comment:       GFR Calc  Starting 12/18/2018, serum creatinine based estimated GFR (eGFR) will be   calculated using the Chronic Kidney Disease Epidemiology Collaboration   (CKD-EPI) equation.      Calcium 9.2 8.5 - 10.1 mg/dL    Bilirubin Total 0.5 0.2 - 1.3 mg/dL    Albumin 3.7 3.4 - 5.0 g/dL    Protein Total 7.8 6.8 - 8.8 g/dL    Alkaline Phosphatase 65 40 - 150 U/L    ALT 20 0 - 70 U/L    AST 19 0 - 45 U/L       If you have any questions or concerns, please call the clinic at the  number listed above.       Sincerely,        Lucinda Badillo MD

## 2019-08-26 NOTE — PATIENT INSTRUCTIONS
Before Your Surgery      Call your surgeon if there is any change in your health. This includes signs of a cold or flu (such as a sore throat, runny nose, cough, rash or fever).    Do not smoke, drink alcohol or take over the counter medicine (unless your surgeon or primary care doctor tells you to) for the 24 hours before and after surgery.    If you take prescribed drugs: Follow your doctor s orders about which medicines to take and which to stop until after surgery.    Eating and drinking prior to surgery: follow the instructions from your surgeon    Take a shower or bath the night before surgery. Use the soap your surgeon gave you to gently clean your skin. If you do not have soap from your surgeon, use your regular soap. Do not shave or scrub the surgery site.  Wear clean pajamas and have clean sheets on your bed.       Thank you for choosing [facility] today for your health care needs.     [facility] is transforming primary care  At [facility], we re dedicated to constantly improve how we serve the health care needs of our patients and communities. We re currently making changes to the way we deliver care.      Changes you ll notice include:    An emphasis on building a relationship with a primary care provider    Access to a PAL (personal advocate and liaison) to help guide you with your care needs    Appointment lengths tailored to your specific needs    Greater access to a broader care team and community resources     Improved online access to your care team    Benefits of a primary care provider  If you don t have a designated primary care provider, we encourage you to get to know our care team online and find a provider you d like to see. Most of our providers have a short video on their online provider page. Visit [email or web address] to explore our providers and locations.    Benefits of having a primary care provider include:      They get to know you - your health history, family history and goals, making it  easier to make a health plan together.     You get to know them - making health-related conversations and decisions easier      Primary care doctors help you when you re sick or hurt - but also focus on keeping you healthy with preventive care and screenings.      A doctor who sees you regularly is more likely to notice changes in your health.     To help make sure you get the right care, at the right time, we include PALs, or Patient Advocate Liaisons, as part of your care team. Your PAL will be your first line of contact. They ll advocate for your needs and help you navigate our services, connecting you with care team members and community resources to ensure your care is well coordinated. You ll be introduced to a PAL in an upcoming visit.     Expanded care team access with tailored appointment lengths  Depending on your health care needs, you may have longer or shorter appointments and see additional care team providers - including Medication Therapy Management (MTM) pharmacists, diabetes educators, behavioral health clinicians, or social workers. At times, they may be included in your visit with your provider, or you may see them individually.     Navigating [facility] and community resources   We partner with [facility] to help patients overcome challenges that can make it hard to get health care, including financial hardship, transportation or mobility concerns.     Online access to your health care records and care team  [facility] is our online tool that makes it easy to see your health care information and communicate with your care team.   [facility] allows you to:     View your health maintenance plan so you know when you re due for a preventive screening    Message your PAL or care team     View your health history and visit summaries     Schedule appointments     Complete questionnaires and eCheck-in before appointments      Get care for minor conditions from your provider with an e-visit      View and pay your bill     Sign up at [email or web address]. Once you have an account, you also can download the mobile calin.

## 2019-08-26 NOTE — PROGRESS NOTES
[facility]  [address] [facility] [address]  [phone]  Dept: [phone]    PRE-OP EVALUATION:  Today's date: 2019    Michael Mills (: 1965) presents for pre-operative evaluation assessment as requested by Dr. Mary.  He requires evaluation and anesthesia risk assessment prior to undergoing surgery/procedure for treatment of rt carpal tunnel and left carpal tunnel 2 weeks later  .    Fax number for surgical facility: [facility] [phone]    Primary Physician: Neymar Wesley  Type of Anesthesia Anticipated: General    Patient has a Health Care Directive or Living Will:  NO    Preop Questions 2019   Date of Surgery/Procedure: 2019   Facility or Hospital where procedure/surgery will be performed: [facility]   1.  Do you have a history of Heart attack, stroke, stent, coronary bypass surgery, or other heart surgery? No   2.  Do you ever have any pain or discomfort in your chest? No   3.  Do you have a history of  Heart Failure? No   4.   Are you troubled by shortness of breath when:  walking on a level surface, or up a slight hill, or at night? No   5.  Do you currently have a cold, bronchitis or other respiratory infection? No   6.  Do you have a cough, shortness of breath, or wheezing? No   7.  Do you sometimes get pains in the calves of your legs when you walk? No   8. Do you or anyone in your family have previous history of blood clots? No   9.  Do you or does anyone in your family have a serious bleeding problem such as prolonged bleeding following surgeries or cuts? No   10. Have you ever had problems with anemia or been told to take iron pills? No   11. Have you had any abnormal blood loss such as black, tarry or bloody stools? No   12. Have you ever had a blood transfusion? No   13. Have you or any of your relatives ever had problems with anesthesia? No   14. Do you have sleep apnea, excessive snoring or daytime drowsiness? No   15.  Do you have any prosthetic heart valves? No   16. Do you have prosthetic joints? No         HPI:     HPI related to upcoming procedure: Michael Mills is a 54 year old man who is having right and then left carpal tunnel release surgery.   Dr. Mary is performing the surgery of [facility].           MEDICAL HISTORY:     Patient Active Problem List    Diagnosis Date Noted     RA (rheumatoid arthritis) (H) 12/31/2014     Priority: Medium     Snoring 10/04/2013     Priority: Medium     Pain in limb 10/04/2013     Priority: Medium     Plantar warts 10/04/2013     Priority: Medium     CARDIOVASCULAR SCREENING; LDL GOAL LESS THAN 160 10/31/2010     Priority: Medium     3.7% 10 yr risk 2016       Varicose veins of lower extremities with complications 12/25/2006     Priority: Medium     Problem list name updated by automated process. Provider to review       Premature ejaculation 12/25/2006     Priority: Medium      Past Medical History:   Diagnosis Date     CARDIOVASCULAR SCREENING; LDL GOAL LESS THAN 160 10/31/2010    3.7% 10 yr risk 2016      Hepatic hemangioma      Plantar warts 10/4/2013     Premature ejaculation      RA (rheumatoid arthritis) (H) 12/31/2014     SKIN SENSATION DISTURB 12/25/2006    rt  hand, cts  rt wrist      Snoring 10/4/2013     Varicose veins of lower extremities with complications 12/25/2006     Problem list name updated by automated process. Provider to review     Past Surgical History:   Procedure Laterality Date     COLONOSCOPY N/A 8/16/2019    Procedure: COLONOSCOPY (fv);  Surgeon: Esa Malin MD;  Location:  GI     varicose vein left side       Current Outpatient Medications   Medication Sig Dispense Refill     ciprofloxacin (CIPRO) 500 MG tablet Take 1 tablet (500 mg) by mouth 2 times daily 20 tablet 0     metroNIDAZOLE (FLAGYL) 500 MG tablet Take 1 tablet (500 mg) by mouth 3 times daily 30 tablet 0     omeprazole (PRILOSEC) 20 MG DR capsule Take 1 capsule (20 mg) by mouth daily 30 capsule  "1     predniSONE (DELTASONE) 20 MG tablet Take 3 pills in the morning for 1 week. Then take 2 pills in the morning for 1 week. Then take 1 pill in the morning for 1 week. 42 tablet 0     OTC products: None, except as noted above - he has not started the omeprazole yet    Allergies   Allergen Reactions     No Known Drug Allergies       Latex Allergy: NO    Social History     Tobacco Use     Smoking status: Never Smoker     Smokeless tobacco: Never Used   Substance Use Topics     Alcohol use: Yes     Comment: [id number]  on weekend     History   Drug Use No       REVIEW OF SYSTEMS:   CONSTITUTIONAL: NEGATIVE for fever, chills, change in weight  INTEGUMENTARY/SKIN: NEGATIVE for worrisome rashes, moles or lesions  EYES: NEGATIVE for vision changes or irritation  ENT/MOUTH: NEGATIVE for ear, mouth and throat problems  RESP: NEGATIVE for significant cough or SOB  BREAST: NEGATIVE for masses, tenderness or discharge  CV: NEGATIVE for chest pain, palpitations or peripheral edema  GI: NEGATIVE for nausea, abdominal pain, heartburn, or change in bowel habits  : NEGATIVE for frequency, dysuria, or hematuria  MUSCULOSKELETAL: NEGATIVE for significant arthralgias or myalgia  NEURO: NEGATIVE for weakness, dizziness or paresthesias  ENDOCRINE: NEGATIVE for temperature intolerance, skin/hair changes  HEME: NEGATIVE for bleeding problems  PSYCHIATRIC: NEGATIVE for changes in mood or affect    EXAM:   /87 (BP Location: Right arm, Patient Position: Chair, Cuff Size: Adult Large)   Pulse 68   Temp 98.5  F (36.9  C) (Oral)   Resp 16   Ht 1.727 m (5' 8\")   Wt 68 kg (150 lb)   SpO2 99%   BMI 22.81 kg/m      GENERAL APPEARANCE: healthy, alert and no distress     EYES: EOMI,  PERRL     HENT: ear canals and TM's normal and nose and mouth without ulcers or lesions     NECK: no adenopathy, no asymmetry, masses, or scars and thyroid normal to palpation     RESP: lungs clear to auscultation - no rales, rhonchi or wheezes     " CV: regular rates and rhythm, normal S1 S2, no S3 or S4 and no murmur, click or rub     ABDOMEN:  soft, nontender, no HSM or masses and bowel sounds normal     MS: extremities normal- no gross deformities noted, no evidence of inflammation in joints, FROM in all extremities.     SKIN: no suspicious lesions or rashes     NEURO: Normal strength and tone, sensory exam grossly normal, mentation intact and speech normal     PSYCH: mentation appears normal. and affect normal/bright     LYMPHATICS: No cervical adenopathy    DIAGNOSTICS:     EKG: appears normal, NSR, normal axis, normal intervals, no acute ST/T changes c/w ischemia, no LVH by voltage criteria, unchanged from previous tracings  Chest XRay  Labs Resulted Today:   Results for orders placed or performed in visit on 08/26/19   CBC with platelets   Result Value Ref Range    WBC 8.3 4.0 - 11.0 10e9/L    RBC Count 5.25 4.4 - 5.9 10e12/L    Hemoglobin 16.0 13.3 - 17.7 g/dL    Hematocrit 46.5 40.0 - 53.0 %    MCV 89 78 - 100 fl    MCH 30.5 26.5 - 33.0 pg    MCHC 34.4 31.5 - 36.5 g/dL    RDW 13.7 10.0 - 15.0 %    Platelet Count 190 150 - 450 10e9/L       Recent Labs   Lab Test 06/24/19  1832 10/16/18  1545 02/01/16  06/29/15  1455  11/08/14  0935  06/26/14  1354   HGB 16.9  --   --   --  16.3  --   --   --  16.6     --   --   --  194  --   --   --  170   NA  --  138  --   --   --   --   --   --  141   POTASSIUM  --  4.3  --   --   --   --   --   --  4.8   CR  --  0.99 0.9   < > 0.96   < >  --    < > 1.03   A1C  --   --   --   --   --   --  5.6  --   --     < > = values in this interval not displayed.        IMPRESSION:   Reason for surgery/procedure: bilateral carpal tunnel surgery    The proposed surgical procedure is considered LOW risk.    REVISED CARDIAC RISK INDEX  The patient has the following serious cardiovascular risks for perioperative complications such as (MI, PE, VFib and 3  AV Block):  No serious cardiac risks  INTERPRETATION: 0 risks: Class I  (very low risk - 0.4% complication rate)    The patient has the following additional risks for perioperative complications:  No identified additional risks      ICD-10-CM    1. Preop general physical exam Z01.818        RECOMMENDATIONS:       APPROVAL GIVEN to proceed with proposed procedure, without further diagnostic evaluation       Signed Electronically by: Lucinda Badillo MD    Copy of this evaluation report is provided to requesting physician.    [facility] Preop Guidelines    Revised Cardiac Risk Index

## 2019-08-27 LAB
ALBUMIN SERPL-MCNC: 3.7 G/DL (ref 3.4–5)
ALP SERPL-CCNC: 65 U/L (ref 40–150)
ALT SERPL W P-5'-P-CCNC: 20 U/L (ref 0–70)
ANION GAP SERPL CALCULATED.3IONS-SCNC: 7 MMOL/L (ref 3–14)
AST SERPL W P-5'-P-CCNC: 19 U/L (ref 0–45)
BILIRUB SERPL-MCNC: 0.5 MG/DL (ref 0.2–1.3)
BUN SERPL-MCNC: 10 MG/DL (ref 7–30)
CALCIUM SERPL-MCNC: 9.2 MG/DL (ref 8.5–10.1)
CHLORIDE SERPL-SCNC: 105 MMOL/L (ref 94–109)
CO2 SERPL-SCNC: 27 MMOL/L (ref 20–32)
CREAT SERPL-MCNC: 0.8 MG/DL (ref 0.66–1.25)
GFR SERPL CREATININE-BSD FRML MDRD: >90 ML/MIN/{1.73_M2}
GLUCOSE SERPL-MCNC: 107 MG/DL (ref 70–99)
HIV 1+2 AB+HIV1 P24 AG SERPL QL IA: NONREACTIVE
POTASSIUM SERPL-SCNC: 4.4 MMOL/L (ref 3.4–5.3)
PROT SERPL-MCNC: 7.8 G/DL (ref 6.8–8.8)
SODIUM SERPL-SCNC: 139 MMOL/L (ref 133–144)

## 2019-09-16 ENCOUNTER — TRANSFERRED RECORDS (OUTPATIENT)
Dept: HEALTH INFORMATION MANAGEMENT | Facility: CLINIC | Age: 54
End: 2019-09-16

## 2019-10-10 ENCOUNTER — TRANSFERRED RECORDS (OUTPATIENT)
Dept: HEALTH INFORMATION MANAGEMENT | Facility: CLINIC | Age: 54
End: 2019-10-10

## 2020-01-13 ENCOUNTER — ALLIED HEALTH/NURSE VISIT (OUTPATIENT)
Dept: FAMILY MEDICINE | Facility: CLINIC | Age: 55
End: 2020-01-13
Payer: COMMERCIAL

## 2020-01-13 DIAGNOSIS — Z23 NEED FOR PROPHYLACTIC VACCINATION AND INOCULATION AGAINST INFLUENZA: Primary | ICD-10-CM

## 2020-01-13 PROCEDURE — 90682 RIV4 VACC RECOMBINANT DNA IM: CPT

## 2020-01-13 PROCEDURE — 90471 IMMUNIZATION ADMIN: CPT

## 2020-01-13 PROCEDURE — 99207 ZZC NO CHARGE NURSE ONLY: CPT

## 2020-01-24 ENCOUNTER — APPOINTMENT (OUTPATIENT)
Dept: CT IMAGING | Facility: CLINIC | Age: 55
End: 2020-01-24
Attending: EMERGENCY MEDICINE
Payer: COMMERCIAL

## 2020-01-24 ENCOUNTER — HOSPITAL ENCOUNTER (EMERGENCY)
Facility: CLINIC | Age: 55
Discharge: HOME | End: 2020-01-24
Attending: EMERGENCY MEDICINE | Admitting: EMERGENCY MEDICINE
Payer: COMMERCIAL

## 2020-01-24 VITALS
OXYGEN SATURATION: 99 % | SYSTOLIC BLOOD PRESSURE: 112 MMHG | HEART RATE: 67 BPM | RESPIRATION RATE: 16 BRPM | DIASTOLIC BLOOD PRESSURE: 91 MMHG | TEMPERATURE: 97.5 F

## 2020-01-24 DIAGNOSIS — D18.03 HEPATIC HEMANGIOMA: ICD-10-CM

## 2020-01-24 DIAGNOSIS — K57.32 DIVERTICULITIS OF COLON: ICD-10-CM

## 2020-01-24 LAB
ANION GAP SERPL CALCULATED.3IONS-SCNC: 3 MMOL/L (ref 3–14)
BASOPHILS # BLD AUTO: 0 10E9/L (ref 0–0.2)
BASOPHILS NFR BLD AUTO: 0.5 %
BUN SERPL-MCNC: 12 MG/DL (ref 7–30)
CALCIUM SERPL-MCNC: 8.9 MG/DL (ref 8.5–10.1)
CHLORIDE SERPL-SCNC: 106 MMOL/L (ref 94–109)
CO2 SERPL-SCNC: 26 MMOL/L (ref 20–32)
CREAT BLD-MCNC: 1 MG/DL (ref 0.66–1.25)
CREAT SERPL-MCNC: 0.97 MG/DL (ref 0.66–1.25)
DIFFERENTIAL METHOD BLD: NORMAL
EOSINOPHIL # BLD AUTO: 0.1 10E9/L (ref 0–0.7)
EOSINOPHIL NFR BLD AUTO: 0.9 %
ERYTHROCYTE [DISTWIDTH] IN BLOOD BY AUTOMATED COUNT: 12.7 % (ref 10–15)
GFR SERPL CREATININE-BSD FRML MDRD: 78 ML/MIN/{1.73_M2}
GFR SERPL CREATININE-BSD FRML MDRD: 89 ML/MIN/{1.73_M2}
GLUCOSE SERPL-MCNC: 93 MG/DL (ref 70–99)
HCT VFR BLD AUTO: 52 % (ref 40–53)
HGB BLD-MCNC: 17.3 G/DL (ref 13.3–17.7)
IMM GRANULOCYTES # BLD: 0 10E9/L (ref 0–0.4)
IMM GRANULOCYTES NFR BLD: 0.2 %
LYMPHOCYTES # BLD AUTO: 1.5 10E9/L (ref 0.8–5.3)
LYMPHOCYTES NFR BLD AUTO: 17 %
MCH RBC QN AUTO: 30.7 PG (ref 26.5–33)
MCHC RBC AUTO-ENTMCNC: 33.3 G/DL (ref 31.5–36.5)
MCV RBC AUTO: 92 FL (ref 78–100)
MONOCYTES # BLD AUTO: 0.7 10E9/L (ref 0–1.3)
MONOCYTES NFR BLD AUTO: 8 %
NEUTROPHILS # BLD AUTO: 6.5 10E9/L (ref 1.6–8.3)
NEUTROPHILS NFR BLD AUTO: 73.4 %
NRBC # BLD AUTO: 0 10*3/UL
NRBC BLD AUTO-RTO: 0 /100
PLATELET # BLD AUTO: 175 10E9/L (ref 150–450)
POTASSIUM SERPL-SCNC: 4.2 MMOL/L (ref 3.4–5.3)
RBC # BLD AUTO: 5.63 10E12/L (ref 4.4–5.9)
SODIUM SERPL-SCNC: 135 MMOL/L (ref 133–144)
WBC # BLD AUTO: 8.9 10E9/L (ref 4–11)

## 2020-01-24 PROCEDURE — 25000128 H RX IP 250 OP 636: Performed by: EMERGENCY MEDICINE

## 2020-01-24 PROCEDURE — 96361 HYDRATE IV INFUSION ADD-ON: CPT

## 2020-01-24 PROCEDURE — 25800030 ZZH RX IP 258 OP 636: Performed by: EMERGENCY MEDICINE

## 2020-01-24 PROCEDURE — 96376 TX/PRO/DX INJ SAME DRUG ADON: CPT

## 2020-01-24 PROCEDURE — 82565 ASSAY OF CREATININE: CPT | Mod: 91

## 2020-01-24 PROCEDURE — 99285 EMERGENCY DEPT VISIT HI MDM: CPT | Mod: 25

## 2020-01-24 PROCEDURE — 96374 THER/PROPH/DIAG INJ IV PUSH: CPT | Mod: 59

## 2020-01-24 PROCEDURE — 25000125 ZZHC RX 250: Performed by: EMERGENCY MEDICINE

## 2020-01-24 PROCEDURE — 85025 COMPLETE CBC W/AUTO DIFF WBC: CPT | Performed by: EMERGENCY MEDICINE

## 2020-01-24 PROCEDURE — 80048 BASIC METABOLIC PNL TOTAL CA: CPT | Performed by: EMERGENCY MEDICINE

## 2020-01-24 PROCEDURE — 25000132 ZZH RX MED GY IP 250 OP 250 PS 637: Performed by: EMERGENCY MEDICINE

## 2020-01-24 PROCEDURE — 74177 CT ABD & PELVIS W/CONTRAST: CPT

## 2020-01-24 RX ORDER — SODIUM CHLORIDE 9 MG/ML
1000 INJECTION, SOLUTION INTRAVENOUS CONTINUOUS
Status: DISCONTINUED | OUTPATIENT
Start: 2020-01-24 | End: 2020-01-24 | Stop reason: HOSPADM

## 2020-01-24 RX ORDER — HYDROCODONE BITARTRATE AND ACETAMINOPHEN 5; 325 MG/1; MG/1
1 TABLET ORAL EVERY 6 HOURS PRN
Qty: 10 TABLET | Refills: 0 | Status: SHIPPED | OUTPATIENT
Start: 2020-01-24 | End: 2020-01-27

## 2020-01-24 RX ORDER — METRONIDAZOLE 500 MG/1
500 TABLET ORAL ONCE
Status: COMPLETED | OUTPATIENT
Start: 2020-01-24 | End: 2020-01-24

## 2020-01-24 RX ORDER — CIPROFLOXACIN 500 MG/1
500 TABLET, FILM COATED ORAL ONCE
Status: COMPLETED | OUTPATIENT
Start: 2020-01-24 | End: 2020-01-24

## 2020-01-24 RX ORDER — CIPROFLOXACIN 500 MG/1
500 TABLET, FILM COATED ORAL 2 TIMES DAILY
Qty: 14 TABLET | Refills: 0 | Status: SHIPPED | OUTPATIENT
Start: 2020-01-24 | End: 2020-01-31

## 2020-01-24 RX ORDER — IOPAMIDOL 755 MG/ML
500 INJECTION, SOLUTION INTRAVASCULAR ONCE
Status: COMPLETED | OUTPATIENT
Start: 2020-01-24 | End: 2020-01-24

## 2020-01-24 RX ORDER — METRONIDAZOLE 500 MG/1
500 TABLET ORAL 3 TIMES DAILY
Qty: 21 TABLET | Refills: 0 | Status: SHIPPED | OUTPATIENT
Start: 2020-01-24 | End: 2020-01-31

## 2020-01-24 RX ORDER — HYDROMORPHONE HYDROCHLORIDE 1 MG/ML
0.5 INJECTION, SOLUTION INTRAMUSCULAR; INTRAVENOUS; SUBCUTANEOUS
Status: DISCONTINUED | OUTPATIENT
Start: 2020-01-24 | End: 2020-01-24 | Stop reason: HOSPADM

## 2020-01-24 RX ADMIN — SODIUM CHLORIDE 59 ML: 9 INJECTION, SOLUTION INTRAVENOUS at 09:32

## 2020-01-24 RX ADMIN — HYDROMORPHONE HYDROCHLORIDE 0.5 MG: 1 INJECTION, SOLUTION INTRAMUSCULAR; INTRAVENOUS; SUBCUTANEOUS at 09:10

## 2020-01-24 RX ADMIN — HYDROMORPHONE HYDROCHLORIDE 0.5 MG: 1 INJECTION, SOLUTION INTRAMUSCULAR; INTRAVENOUS; SUBCUTANEOUS at 10:02

## 2020-01-24 RX ADMIN — SODIUM CHLORIDE 500 ML: 9 INJECTION, SOLUTION INTRAVENOUS at 09:10

## 2020-01-24 RX ADMIN — CIPROFLOXACIN HYDROCHLORIDE 500 MG: 500 TABLET, FILM COATED ORAL at 10:46

## 2020-01-24 RX ADMIN — IOPAMIDOL 75 ML: 755 INJECTION, SOLUTION INTRAVENOUS at 09:31

## 2020-01-24 RX ADMIN — METRONIDAZOLE 500 MG: 500 TABLET ORAL at 10:46

## 2020-01-24 ASSESSMENT — ENCOUNTER SYMPTOMS
FLANK PAIN: 1
DIARRHEA: 0
FEVER: 0
DIZZINESS: 1
CONSTIPATION: 0
NAUSEA: 0
BLOOD IN STOOL: 1
DYSURIA: 0
ABDOMINAL PAIN: 1

## 2020-01-24 NOTE — ED PROVIDER NOTES
History     Chief Complaint:  Flank Pain      HPI   Michael Mills is a 54 year old male with a distant history of nephrolithiasis and more recent diverticulitis (06/2019) who presents with flank pain. He had a colonoscopy on 08/16/2019 as below. Patient's left sided flank pain began five days ago, extending across his lower abdomen and inguinal region. He also has some low back pain, so he thought that the strained himself shoveling. However, the pain became significantly worse last night, which is why he presents today. He thinks it might feel like previous kidney stone pain, though comments that this occurred about 30 years ago and he is not entirely sure. Michael also endorses episodic dizziness with postural changes and urinary urgency when the pain crescendos. Additionally, last week he had some blood in his stool, but there was not a lot and so he did not seek evaluation -- he thought it might be a hemorrhoid. He states that the pain is worse with coughing. He denies fever, dysuria, hematuria, nausea, and diarrhea or constipation. The patient has no history of abdominal surgeries.     Colonoscopy 08/16/2019  Impression:       - Diverticulosis in the sigmoid colon.                             - The examination was otherwise normal on direct                             and retroflexion views.                             - No specimens collected.     Allergies:  No Known Drug Allergies     Medications:    The patient is currently on no regular medications.     Past Medical History:    Diverticulitis of sigmoid colon   Hepatic hemangioma   Plantar warts   Premature ejaculation   Rheumatoid arthritis   Skin sensation disturbance   Snoring  Nephrolithiasis   Varicose veins of lower extremities with complications     Past Surgical History:    Colonoscopy   Varicose vein, left side    Family History:    Mother - underactive thyroid, hypertension  Father - cerebrovascular disease   Brother - cancer  ()    Social History:  The patient was accompanied to the ED by his wife.  Smoking Status: Never  Smokeless Tobacco: Never  Alcohol Use: Yes   Marital Status:        Review of Systems   Constitutional: Negative for fever.   Gastrointestinal: Positive for abdominal pain and blood in stool. Negative for constipation, diarrhea and nausea.   Genitourinary: Positive for flank pain and urgency. Negative for dysuria.   Neurological: Positive for dizziness.   All other systems reviewed and are negative.        Physical Exam     Patient Vitals for the past 24 hrs:   BP Temp Temp src Pulse Resp SpO2   20 0845 (!) 129/91 97.5  F (36.4  C) Oral 70 16 98 %       Physical Exam    Nursing note and vitals reviewed.    Constitutional: Pleasant and well groomed. Appears uncomfortable.          HENT:    Mouth/Throat: Oropharynx is without swelling or erythema. Oral mucosa moist.    Eyes: Conjunctivae are normal. No scleral icterus.    Neck: Neck supple.   Cardiovascular: Normal rate, regular rhythm and intact distal pulses.    Pulmonary/Chest: Effort normal and breath sounds normal.   Abdominal: Soft.  No distension. Left lower quadrant tenderness with guarding. Left CVA tenderness.   Musculoskeletal:  No edema, No calf tenderness  Neurological:Alert and oriented. Coordination normal.   Skin: Skin is warm and dry.   Psychiatric: Normal mood and affect.     Emergency Department Course     Imaging:  Radiology findings were communicated with the patient and family who voiced understanding of the findings.    CT Abdomen Pelvis w Contrast  IMPRESSION:  1. Evidence for acute uncomplicated diverticulitis involving the  distal descending colon as detailed above.  2. Colonic diverticulosis involves the remainder of the colon.  3. Stable benign cavernous hemangioma posterior aspect lateral segment  left hepatic lobe.  4. Atherosclerotic vascular calcification.   Report per radiology     Laboratory:  Laboratory findings were  communicated with the patient and family who voiced understanding of the findings.    CBC: AWNL. (WBC 8.9, HGB 17.3, )   BMP: AWNL (Creatinine 0.97)     Creatinine POCT (0904): AWNL     Interventions:  0910 NS Bolus 500mL IV    Dilaudid 0.5mg IV    1002 Dilaudid 0.5mg IV    1046 Cipro 500mg PO   Flagyl 500mg PO     Emergency Department Course:  Past medical records, nursing notes, and vitals reviewed.    0850 I performed an exam of the patient as documented above.     IV was inserted and blood was drawn for laboratory testing, results above.  The patient was sent for a CT Abdomen Pelvis w Contrast while in the emergency department, results above.     1029 I rechecked the patient and discussed the results of his workup thus far.     Findings and plan explained to the Patient and spouse. Patient discharged home with instructions regarding supportive care, medications, and reasons to return. The importance of close follow-up was reviewed. The patient was prescribed Cipro, Norco, and Flagyl    I personally reviewed the laboratory and imaging results with the Patient and spouse and answered all related questions prior to discharge.     Impression & Plan     Medical Decision Making:  The patient presented with abdominal pain and CT confirms diverticulitis without abscess or perforation.  The patient's pain has been controlled by interventions in the emergency department. At this time the patient does not have peritoneal findings on abdominal examination.  This represents uncomplicated diverticulitis at this time.  The natural history of this problem was discussed, and I educated the patient regarding the symptoms and signs that should prompt return to the Emergency Department.  This would include worsening fevers, chills, vomiting, and more intense pain.  The patient is to take antibiotics and pain medications as directed.  I have recommended liquid/limited diet until symptoms seem to be improving and then advance  as tolerated. Follow-up with primary care physician is indicated in 1-2 days.  If more episodes occur in the future, consultation with colo-rectal surgery is indicated.    Plan:   1. Return to an ED with new or worse symptoms  2. Follow up with your PMD in 2 days for repeat evaluation  3. Provided with standard EPPA Discharge instructions for Abdominal pain as well as EPIC instructions on Diverticulitis  4. The patient received prescriptions for Ciprofloxacin, Flagyl and Norco.    Diagnosis:    ICD-10-CM    1. Diverticulitis of colon K57.32    2. Hepatic hemangioma D18.03     seen previously       Disposition:  Discharged to home.    Discharge Medications:  New Prescriptions    CIPROFLOXACIN (CIPRO) 500 MG TABLET    Take 1 tablet (500 mg) by mouth 2 times daily for 7 days    HYDROCODONE-ACETAMINOPHEN (NORCO) 5-325 MG TABLET    Take 1 tablet by mouth every 6 hours as needed for severe pain    METRONIDAZOLE (FLAGYL) 500 MG TABLET    Take 1 tablet (500 mg) by mouth 3 times daily for 7 days       Scribe Disclosure:  Adore CURTIS, am serving as a scribe at 8:42 AM on 1/24/2020 to document services personally performed by Toya Kramer MD based on my observations and the provider's statements to me.   1/24/2020   [facility] EMERGENCY DEPARTMENT       Toya Kramer MD  01/28/20 [id number]

## 2020-01-24 NOTE — ED TRIAGE NOTES
Pt arrives c/o flank pain that started on Monday or Tuesday. Patient states the pain increased overnight. Hx of kidney stone about 30 years ago.

## 2020-01-24 NOTE — ED AVS SNAPSHOT
[facility] Emergency Department  [address]  [facility] [address]  Phone:  [phone]  Fax:  [phone]                                    Michael Mills   MRN: [id number]    Department:  [facility] Emergency Department   Date of Visit:  1/24/2020           After Visit Summary Signature Page    I have received my discharge instructions, and my questions have been answered. I have discussed any challenges I see with this plan with the nurse or doctor.    ..........................................................................................................................................  Patient/Patient Representative Signature      ..........................................................................................................................................  Patient Representative Print Name and Relationship to Patient    ..................................................               ................................................  Date                                   Time    ..........................................................................................................................................  Reviewed by Signature/Title    ...................................................              ..............................................  Date                                               Time          22EPIC Rev 08/18

## 2020-01-24 NOTE — DISCHARGE INSTRUCTIONS
Discharge Instructions  Diverticulitis  Your provider has diagnosed you with diverticulitis.  Diverticulitis is an infection of a diverticulum, which is a tiny sack-like structure that protrudes off the wall of the colon (large intestine).  These sacks are created over years of increased pressure in the colon (this is diverticulosis), usually as a result of a diet without enough fruits, vegetables, and whole grains. Because these sacks are small, bacteria can get trapped inside them and cause an infection (this is divericulitis).  This infection often causes abdominal (belly) pain, fever, nausea (sick to stomach), and vomiting (throwing up). Diverticulitis is usually treated at home.  However, sometimes diverticulitis needs treatment in the hospital and may even need surgery.    Generally, every Emergency Department visit should have a follow-up clinic visit with either a primary or a specialty clinic/provider. Please follow-up as instructed by your emergency provider today.    Return to the Emergency Department if:   You get an oral temperature above 102oF or as directed by your provider.  You have blood in your stools (bright red or black, tarry stools), or have blood in your vomit.  You keep vomiting or cannot drink liquids or cannot keep your medicine down.  You cannot have a bowel movement or you cannot pass gas.  Your stomach gets bloated or bigger.  You faint or become very weak.  Your pain is too bad to tolerate.  You have new symptoms or anything that worries you.    What can I do to help myself?  Fill any antibiotic prescriptions the provider gave you and take them right away. Be sure to finish the whole antibiotic prescription.  For the first day or two at home drink only clear liquids.  This lets your intestines rest.  If your pain has improved after one or two days, you may start eating mild foods. Soda crackers, toast, plain noodles, gelatin, applesauce and bananas are good first choices.  Avoid foods  "that have acid, are spicy, fatty or fibrous (such as meats, coarse grains, vegetables). You may start eating these foods again in about 3-4 days when you are better.  Once you are back to normal, eat a high fiber diet of fruits, vegetables and whole grains. Some people think you should avoid eating nuts, seeds, and corn, but there is no definite proof this makes any difference in whether you will get diverticulitis again.   Pain and fever can be treated with Tylenol  (acetaminophen) or you might have been prescribed a pain medication.    Probiotics: If you have been given an antibiotic, you may want to also take a probiotic pill or eat yogurt with live cultures. Probiotics have \"good bacteria\" to help your intestines stay healthy. Studies have shown that probiotics help prevent diarrhea and other intestine problems (including C. diff infection) when you take antibiotics. You can buy these without a prescription in the pharmacy section of the store.  If you were given a prescription for medicine here today, be sure to read all of the information (including the package insert) that comes with your prescription.  This will include important information about the medicine, its side effects, and any warnings that you need to know about.  The pharmacist who fills the prescription can provide more information and answer questions you may have about the medicine.  If you have questions or concerns that the pharmacist cannot address, please call or return to the Emergency Department.     Remember that you can always come back to the Emergency Department if you are not able to see your regular provider in the amount of time listed above, if you get any new symptoms, or if there is anything that worries you.   Opioid Medication Discharge Instructions    You have been given a prescription for an opioid (narcotic) pain medicine and/or have   received a pain medicine while here in the emergency department. These medicines can " make you drowsy or impaired.     You must not drive, operate dangerous equipment, or   engage in any other dangerous activities while taking these medications. If you drive while taking these medications, you could be arrested for DUI, or driving under the   influence. Do not drink any alcohol while you are taking these medications.     Opioid pain medications can cause addiction. If you have a history of chemical   dependency of any type, you are at a higher risk of becoming addicted to pain   medications. Only take these prescribed medications to treat your pain when all other   options have been tried. Take it for as short a time and as few doses as possible.     Store your pain pills in a secure place, as they are frequently stolen and provide a dangerous opportunity for children or visitors in your house to start abusing these powerful medications. We will not replace any lost or stolen medicine.     As soon as your pain is better, you should safely dispose of all your remaining medication.     Many prescription pain medications contain Tylenol (acetaminophen), including Vicodin, Tylenol #3, Norco, Lortab, and Percocet. You should not take any extra pills of Tylenol if you are using these prescription medications or you can get very sick. Do not ever take more than 4000 mg of acetaminophen in any 24 hour period.    All opioids tend to cause constipation. Drink plenty of water and eat foods that have   a lot of fiber, such as fruits, vegetables, prune juice, apple juice and high fiber cereal.   Take a laxative if you don t move your bowels at least every other day. Miralax, Milk of   Magnesia, Colace, or Senna can be used to keep you regular.

## 2020-01-24 NOTE — LETTER
January 24, 2020      To Whom It May Concern:      Michael Mills was seen in our Emergency Department today, 01/24/20.  I expect his condition to improve over the next 2-3 days.  He may return to work/school when improved.    Sincerely,        Neeta Nelson RN

## 2020-09-15 NOTE — PROGRESS NOTES
Pre-Visit Planning     Future Appointments   Date Time Provider Department Center   9/16/2020  7:10 AM Lucinda Badillo MD [facility]     Arrival Time for this Appointment:  6:50 AM   Appointment Notes for this encounter:   px    Questionnaires Reviewed/Assigned  No additional questionnaires are needed  Last OV with provider  08/26/2019 OV Lucinda Badillo MD    Hospital ER Visits  01/24/2020 ER diverticulitis    Specialty Visits  08/28/2020 Optometry [facility]    Imaging and Lab Review  See ER visit epic    Recent Procedures  none    Health Maintenance Due   Topic Date Due     ZOSTER IMMUNIZATION (1 of 2) 02/26/2017     PREVENTIVE CARE VISIT  10/16/2019     PHQ-2  01/01/2020     ANNUAL REVIEW OF HM ORDERS  06/24/2020     INFLUENZA VACCINE (1) 09/01/2020     No current outpatient medications on file.     No current facility-administered medications for this visit.        My chart active?  No  Spoke to pt no meds  Patient preferred phone number: [phone]

## 2020-09-16 ENCOUNTER — ANCILLARY PROCEDURE (OUTPATIENT)
Dept: GENERAL RADIOLOGY | Facility: CLINIC | Age: 55
End: 2020-09-16
Attending: FAMILY MEDICINE
Payer: COMMERCIAL

## 2020-09-16 ENCOUNTER — OFFICE VISIT (OUTPATIENT)
Dept: FAMILY MEDICINE | Facility: CLINIC | Age: 55
End: 2020-09-16
Payer: COMMERCIAL

## 2020-09-16 VITALS
RESPIRATION RATE: 16 BRPM | OXYGEN SATURATION: 100 % | TEMPERATURE: 98.2 F | HEIGHT: 67 IN | BODY MASS INDEX: 23.26 KG/M2 | SYSTOLIC BLOOD PRESSURE: 136 MMHG | HEART RATE: 69 BPM | WEIGHT: 148.2 LBS | DIASTOLIC BLOOD PRESSURE: 87 MMHG

## 2020-09-16 DIAGNOSIS — R05.9 COUGH: ICD-10-CM

## 2020-09-16 DIAGNOSIS — Z23 NEED FOR SHINGLES VACCINE: Primary | ICD-10-CM

## 2020-09-16 DIAGNOSIS — K21.9 GASTROESOPHAGEAL REFLUX DISEASE, ESOPHAGITIS PRESENCE NOT SPECIFIED: ICD-10-CM

## 2020-09-16 DIAGNOSIS — Z00.00 ROUTINE GENERAL MEDICAL EXAMINATION AT A HEALTH CARE FACILITY: ICD-10-CM

## 2020-09-16 LAB
CHOLEST SERPL-MCNC: 220 MG/DL
HDLC SERPL-MCNC: 44 MG/DL
LDLC SERPL CALC-MCNC: 143 MG/DL
NONHDLC SERPL-MCNC: 176 MG/DL
TRIGL SERPL-MCNC: 166 MG/DL

## 2020-09-16 PROCEDURE — 36415 COLL VENOUS BLD VENIPUNCTURE: CPT | Performed by: FAMILY MEDICINE

## 2020-09-16 PROCEDURE — 86769 SARS-COV-2 COVID-19 ANTIBODY: CPT | Performed by: FAMILY MEDICINE

## 2020-09-16 PROCEDURE — 90682 RIV4 VACC RECOMBINANT DNA IM: CPT | Performed by: FAMILY MEDICINE

## 2020-09-16 PROCEDURE — 80061 LIPID PANEL: CPT | Performed by: FAMILY MEDICINE

## 2020-09-16 PROCEDURE — 90472 IMMUNIZATION ADMIN EACH ADD: CPT | Performed by: FAMILY MEDICINE

## 2020-09-16 PROCEDURE — 90471 IMMUNIZATION ADMIN: CPT | Performed by: FAMILY MEDICINE

## 2020-09-16 PROCEDURE — 71046 X-RAY EXAM CHEST 2 VIEWS: CPT

## 2020-09-16 PROCEDURE — 99396 PREV VISIT EST AGE 40-64: CPT | Mod: 25 | Performed by: FAMILY MEDICINE

## 2020-09-16 PROCEDURE — 90750 HZV VACC RECOMBINANT IM: CPT | Performed by: FAMILY MEDICINE

## 2020-09-16 RX ORDER — FLUTICASONE PROPIONATE 50 MCG
1 SPRAY, SUSPENSION (ML) NASAL DAILY
COMMUNITY
Start: 2020-09-16 | End: 2021-11-15

## 2020-09-16 ASSESSMENT — ENCOUNTER SYMPTOMS
DIARRHEA: 0
EYE PAIN: 0
HEMATURIA: 0
DIZZINESS: 0
CHILLS: 0
COUGH: 0
FEVER: 0
HEMATOCHEZIA: 0
CONSTIPATION: 0
NERVOUS/ANXIOUS: 0
ABDOMINAL PAIN: 0

## 2020-09-16 ASSESSMENT — MIFFLIN-ST. JEOR: SCORE: 1475.86

## 2020-09-16 NOTE — PROGRESS NOTES
SUBJECTIVE:   CC: Michael Mills is an 55 year old male who presents for preventative health visit.     He continues to have heartburn.   He wakes with some burning in the mouth and throat.   He has tried zantac and then omeprazole up to 40 mg per day.   They helped but not enough.   He is not taking anything right now.   This has been for about 5 years.      He has had cold since March.   He has postnasal drip and has to cough when lying down.   It has been  Since March.   He has NOT had shortness of breath or chest pain.   He feels he needs to hit his chest to bring up the sputum.         Patient has been advised of split billing requirements and indicates understanding: Yes  Healthy Habits:     Getting at least 3 servings of Calcium per day:  Yes    Bi-annual eye exam:  NO    Dental care twice a year:  Yes    Sleep apnea or symptoms of sleep apnea:  None    Diet:  Other    Frequency of exercise:  4-5 days/week    Duration of exercise:  15-30 minutes    Taking medications regularly:  Yes    Medication side effects:  None    PHQ-2 Total Score: 0    Additional concerns today:  No          Patient has been dealing with congestion in his chest since March.    Today's PHQ-2 Score:   PHQ-2 ( 1999 [employer]) 9/16/2020   Q1: Little interest or pleasure in doing things 0   Q2: Feeling down, depressed or hopeless 0   PHQ-2 Score 0   Q1: Little interest or pleasure in doing things Not at all   Q2: Feeling down, depressed or hopeless Not at all   PHQ-2 Score 0       Abuse: Current or Past(Physical, Sexual or Emotional)- No  Do you feel safe in your environment? Yes        Social History     Tobacco Use     Smoking status: Never Smoker     Smokeless tobacco: Never Used   Substance Use Topics     Alcohol use: Yes     Comment: socially, 4-5 drinks on the weekend         Alcohol Use 9/16/2020   Prescreen: >3 drinks/day or >7 drinks/week? Not Applicable   Prescreen: >3 drinks/day or >7 drinks/week? -       Last PSA: No results  found for: PSA    Reviewed orders with patient. Reviewed health maintenance and updated orders accordingly - Yes  Labs reviewed in EPIC    Past Medical History:   Diagnosis Date     CARDIOVASCULAR SCREENING; LDL GOAL LESS THAN 160 10/31/2010    3.7% 10 yr risk 2016      Diverticulitis of sigmoid colon 2019    also diverticula found on colonscopy in 2019     Hepatic hemangioma      Plantar warts 10/4/2013     Premature ejaculation      RA (rheumatoid arthritis) (H) 2014     SKIN SENSATION DISTURB 2006    rt  hand, cts  rt wrist      Snoring 10/4/2013     Varicose veins of lower extremities with complications 2006     Problem list name updated by automated process. Provider to review       Past Surgical History:   Procedure Laterality Date     AS REPAIR  ANAL FISTULA,RECT ADV FLAP      Dr. Blair Bishop     CARPAL TUNNEL RELEASE RT/LT      both sides     COLONOSCOPY N/A 2019    [facility] - repeat in 10 years     varicose vein left side         MEDICATIONS:  Current Outpatient Medications   Medication     esomeprazole (NEXIUM) 20 MG DR capsule     No current facility-administered medications for this visit.        SOCIAL HISTORY:  Social History     Tobacco Use     Smoking status: Never Smoker     Smokeless tobacco: Never Used   Substance Use Topics     Alcohol use: Yes     Comment: socially, 4-5 drinks on the weekend       Family History   Problem Relation Age of Onset     Thyroid Disease Mother         underactive      Hypertension Mother      Cerebrovascular Disease Father      Family History Negative Maternal Grandmother      Myocardial Infarction Maternal Grandfather      Family History Negative Paternal Grandmother      Family History Negative Paternal Grandfather      Family History Negative Sister         1     Family History Negative Brother         1- at age of 37-from cancer ?origin      Cancer - colorectal No family hx of      Prostate Cancer No family hx of   "        Review of Systems   Constitutional: Negative for chills and fever.   HENT: Positive for congestion. Negative for ear pain.    Eyes: Negative for pain.   Respiratory: Negative for cough.    Cardiovascular: Negative for chest pain.   Gastrointestinal: Negative for abdominal pain, constipation, diarrhea and hematochezia.   Genitourinary: Negative for hematuria.   Neurological: Negative for dizziness.   Psychiatric/Behavioral: The patient is not nervous/anxious.        OBJECTIVE:   /87 (BP Location: Right arm, Patient Position: Sitting, Cuff Size: Adult Regular)   Pulse 69   Temp 98.2  F (36.8  C) (Oral)   Resp 16   Ht 1.702 m (5' 7\")   Wt 67.2 kg (148 lb 3.2 oz)   SpO2 100%   BMI 23.21 kg/m      Physical Exam  GENERAL: healthy, alert and no distress  EYES: Eyes grossly normal to inspection, PERRL and conjunctivae and sclerae normal  HENT: ear canals and TM's normal, nose and mouth without ulcers or lesions  NECK: no adenopathy, no asymmetry, masses, or scars and thyroid normal to palpation  RESP: lungs clear to auscultation - no rales, rhonchi or wheezes  CV: regular rate and rhythm, normal S1 S2, no S3 or S4, no murmur, click or rub, no peripheral edema and peripheral pulses strong  ABDOMEN: soft, nontender, no hepatosplenomegaly, no masses and bowel sounds normal   (male): normal male genitalia without lesions or urethral discharge, no hernia  RECTAL: normal sphincter tone, no rectal masses, prostate normal size, smooth, nontender without nodules or masses  MS: no gross musculoskeletal defects noted, no edema  SKIN: no suspicious lesions or rashes  NEURO: Normal strength and tone, mentation intact and speech normal  PSYCH: mentation appears normal, affect normal/bright  LYMPH: no cervical, supraclavicular, axillary, or inguinal adenopathy    Diagnostic Test Results:  Labs reviewed in Epic    ASSESSMENT/PLAN:   1. Need for shingles vaccine    - VACCINE ADMINISTRATION, INITIAL  - ZOSTER VACCINE " "RECOMBINANT ADJUVANTED IM NJX    2. Routine general medical examination at a health care facility    - REVIEW OF HEALTH MAINTENANCE PROTOCOL ORDERS    3. Cough  Will check chest xray and covid and if neg, could be postnasal drip  - XR Chest 2 Views; Future  - COVID-19 Virus (Coronavirus) Antibody & Titer Reflex; Future    4. Gastroesophageal reflux disease, esophagitis presence not specified  Will try one or two per day and if do not work, will send to GI  - esomeprazole (NEXIUM) 20 MG DR capsule; Take 1 capsule (20 mg) by mouth every morning (before breakfast) Take 30-60 minutes before eating.  Dispense: 30 capsule; Refill: 11    Patient has been advised of split billing requirements and indicates understanding: Yes  COUNSELING:   Reviewed preventive health counseling, as reflected in patient instructions  Special attention given to:        Healthy diet/nutrition       Colon cancer screening       Prostate cancer screening    Estimated body mass index is 23.21 kg/m  as calculated from the following:    Height as of this encounter: 1.702 m (5' 7\").    Weight as of this encounter: 67.2 kg (148 lb 3.2 oz).         He reports that he has never smoked. He has never used smokeless tobacco.      Counseling Resources:  ATP IV Guidelines  Pooled Cohorts Equation Calculator  FRAX Risk Assessment  ICSI Preventive Guidelines  Dietary Guidelines for Americans, 2010  USDA's MyPlate  ASA Prophylaxis  Lung CA Screening    Lucinda Badillo MD  [facility]  "

## 2020-09-16 NOTE — PATIENT INSTRUCTIONS
Preventive Health Recommendations  Male Ages 50 - 64    Yearly exam:             See your health care provider every year in order to  o   Review health changes.   o   Discuss preventive care.    o   Review your medicines if your doctor has prescribed any.     Have a cholesterol test every 5 years, or more frequently if you are at risk for high cholesterol/heart disease.     Have a diabetes test (fasting glucose) every three years. If you are at risk for diabetes, you should have this test more often.     Have a colonoscopy at age 50, or have a yearly FIT test (stool test). These exams will check for colon cancer.      Talk with your health care provider about whether or not a prostate cancer screening test (PSA) is right for you.    You should be tested each year for STDs (sexually transmitted diseases), if you re at risk.     Shots: Get a flu shot each year. Get a tetanus shot every 10 years.     Nutrition:    Eat at least 5 servings of fruits and vegetables daily.     Eat whole-grain bread, whole-wheat pasta and brown rice instead of white grains and rice.     Get adequate Calcium and Vitamin D.     Lifestyle    Exercise for at least 150 minutes a week (30 minutes a day, 5 days a week). This will help you control your weight and prevent disease.     Limit alcohol to one drink per day.     No smoking.     Wear sunscreen to prevent skin cancer.     See your dentist every six months for an exam and cleaning.     See your eye doctor every 1 to 2 years.      Dr. Blair Bishop  Colon & rectal surgery  [address], [place] [id number] [phone]

## 2020-09-17 LAB
COVID-19 SPIKE RBD ABY TITER: NORMAL
COVID-19 SPIKE RBD ABY: NEGATIVE

## 2020-09-21 ENCOUNTER — TELEPHONE (OUTPATIENT)
Dept: FAMILY MEDICINE | Facility: CLINIC | Age: 55
End: 2020-09-21

## 2020-09-21 NOTE — TELEPHONE ENCOUNTER
Left message for patient to call clinic back regarding Dr. Badillo's message.    Kelley John, CMA

## 2020-09-21 NOTE — TELEPHONE ENCOUNTER
----- Message from Lucinda Badillo MD sent at 9/21/2020  8:29 AM CDT -----  covid test is negative

## 2021-02-21 ENCOUNTER — ANCILLARY PROCEDURE (OUTPATIENT)
Dept: GENERAL RADIOLOGY | Facility: CLINIC | Age: 56
End: 2021-02-21
Attending: PHYSICIAN ASSISTANT
Payer: COMMERCIAL

## 2021-02-21 ENCOUNTER — OFFICE VISIT (OUTPATIENT)
Dept: URGENT CARE | Facility: URGENT CARE | Age: 56
End: 2021-02-21
Payer: COMMERCIAL

## 2021-02-21 VITALS
SYSTOLIC BLOOD PRESSURE: 130 MMHG | OXYGEN SATURATION: 97 % | BODY MASS INDEX: 23.85 KG/M2 | RESPIRATION RATE: 16 BRPM | TEMPERATURE: 98 F | DIASTOLIC BLOOD PRESSURE: 88 MMHG | HEART RATE: 85 BPM | WEIGHT: 152.3 LBS

## 2021-02-21 DIAGNOSIS — M05.79 RHEUMATOID ARTHRITIS INVOLVING MULTIPLE SITES WITH POSITIVE RHEUMATOID FACTOR (H): ICD-10-CM

## 2021-02-21 DIAGNOSIS — M62.830 BACK MUSCLE SPASM: ICD-10-CM

## 2021-02-21 DIAGNOSIS — M25.511 ACUTE PAIN OF RIGHT SHOULDER: Primary | ICD-10-CM

## 2021-02-21 DIAGNOSIS — M25.511 ACUTE PAIN OF RIGHT SHOULDER: ICD-10-CM

## 2021-02-21 LAB
CRP SERPL-MCNC: 14 MG/L (ref 0–8)
ERYTHROCYTE [SEDIMENTATION RATE] IN BLOOD BY WESTERGREN METHOD: 12 MM/H (ref 0–20)

## 2021-02-21 PROCEDURE — 36415 COLL VENOUS BLD VENIPUNCTURE: CPT | Performed by: PHYSICIAN ASSISTANT

## 2021-02-21 PROCEDURE — 73030 X-RAY EXAM OF SHOULDER: CPT | Mod: RT | Performed by: RADIOLOGY

## 2021-02-21 PROCEDURE — 99214 OFFICE O/P EST MOD 30 MIN: CPT | Performed by: PHYSICIAN ASSISTANT

## 2021-02-21 PROCEDURE — 85652 RBC SED RATE AUTOMATED: CPT | Performed by: PHYSICIAN ASSISTANT

## 2021-02-21 PROCEDURE — 86140 C-REACTIVE PROTEIN: CPT | Performed by: PHYSICIAN ASSISTANT

## 2021-02-21 RX ORDER — CYCLOBENZAPRINE HCL 10 MG
TABLET ORAL
Qty: 15 TABLET | Refills: 0 | Status: SHIPPED | OUTPATIENT
Start: 2021-02-21 | End: 2021-08-23

## 2021-02-21 RX ORDER — PREDNISONE 20 MG/1
TABLET ORAL
Qty: 11 TABLET | Refills: 0 | Status: SHIPPED | OUTPATIENT
Start: 2021-02-21 | End: 2021-08-23

## 2021-02-21 RX ORDER — IBUPROFEN 600 MG/1
600 TABLET, FILM COATED ORAL EVERY 8 HOURS PRN
Qty: 30 TABLET | Refills: 0 | Status: SHIPPED | OUTPATIENT
Start: 2021-02-21 | End: 2021-08-23

## 2021-02-21 NOTE — PROGRESS NOTES
Assessment & Plan     Acute pain of right shoulder  Onset of symptoms since last night.  Denies any acute injury or trauma.  X-ray today is negative for acute findings.  Exam is consistent with impingement syndrome/bursitis.  He also has a history of RA which could be a contributing factor. ESR is within normal limit.  CRP inflammatory marker is pending.  Prednisone taper Rx. Ibuprofen as needed for pain.  Icing or heat to the affected area.  We discussed ultimately follow-up with rheumatology or orthopedics if ongoing symptoms.  He agrees with the plan.  - CRP, inflammation  - ESR: Erythrocyte sedimentation rate  - XR Shoulder Right G/E 3 Views  - predniSONE (DELTASONE) 20 MG tablet  Dispense: 11 tablet; Refill: 0  - ibuprofen (ADVIL/MOTRIN) 600 MG tablet  Dispense: 30 tablet; Refill: 0    Rheumatoid arthritis involving multiple sites with positive rheumatoid factor (H)  - predniSONE (DELTASONE) 20 MG tablet  Dispense: 11 tablet; Refill: 0    Back muscle spasm  - cyclobenzaprine (FLEXERIL) 10 MG tablet  Dispense: 15 tablet; Refill: 0         Return in about 2 weeks (around 3/7/2021) for Symptoms failing to improve.    Radha Duke PA-C  [facility] [place]    Pancho Rodriguez is a 55 year old male who presents to clinic today for the following health issues:  Chief Complaint   Patient presents with     Musculoskeletal Problem     right shoulder and pain pain      HPI      MS Pain    Onset of symptoms was last night  Location: right shoulder  Context:       He does not recall any trauma or injury however does a lot of repetitive hand/arm motion at work.  He denies any fall.  Course of symptoms is same.    Severity moderately severe  Current and Associated symptoms: Pain, Tenderness and Decreased range of motion  Denies  Swelling, Bruising, Warmth and Redness  Aggravating Factors: movement  Therapies to improve symptoms include: Tylenol- did not help  This is not the first time this type  of problem has occurred for this patient.   Hx of RA. Has not been on any medication for the past 3 years. He reports today his hands are also painful.  He also reports today left sided back spasm off and on.      Review of Systems  Constitutional, HEENT, cardiovascular, pulmonary, gi and gu systems are negative, except as otherwise noted.      Objective    /88 (BP Location: Right arm, Patient Position: Chair, Cuff Size: Adult Large)   Pulse 85   Temp 98  F (36.7  C) (Oral)   Resp 16   Wt 69.1 kg (152 lb 4.8 oz)   SpO2 97%   BMI 23.85 kg/m    Physical Exam   GENERAL: healthy, alert and no distress  MS: Right shoulder exam: No gross deformities, swelling or ecchymosis noted.  Tenderness over distal clavicle, below the acromium with overhead motion of the right arm.  Back exam: No gross deformities, swelling or ecchymosis noted.  Chest: Normal heart tones, normal S1,S2  Lungs: Clear    Xray right shoulder - Reviewed and interpreted by me.  No acute fractures or dislocations. No abnormal bony lesions.

## 2021-02-21 NOTE — PATIENT INSTRUCTIONS
Patient Education     What Is Rheumatoid Arthritis?  Rheumatoid arthritis (RA) is a disease that affects the synovium, the lining of the joints. This causes pain, swelling, and stiffness. Left untreated, rheumatoid arthritis may damage joints so badly that they no longer function. This disease appears most often in young-adult to middle-age women.     Rheumatoid arthritis affects the lining (synovium) of the joints, sometimes causing swelling.   What causes RA?  RA is an autoimmune disorder. This means the immune system, which normally protects the body, actually causes harm. In RA, the immune system attacks the joint lining. The reason for this is unknown. Researchers believe it is a combination of genes (what is inherited from parents) and environment (for example, having infections from viruses or bacteria). Also, people who smoke or are overweight have an increased risk of developing RA.  What are the symptoms of RA?  Rheumatoid arthritis can affect most joints. The hands, wrists, elbows, knees, and balls of the feet are common sites. This disease often affects the same joint on both sides of the body. Symptoms may include:    Tender, swollen inflamed joints. They may also look red and feel warm.    Stiff joints. Long periods of rest or using a joint too long or too hard can make stiffness worse. Morning stiffness lasting more than 30 minutes is very common.    Joints that have lost normal shape and motion.    Feeling tired.  How is RA diagnosed?  To diagnose rheumatoid arthritis, your healthcare provider will ask you a lot of questions about your health history and current symptoms. He or she will examine you, paying close attention to your joints. You will also have blood tests and X-rays. Your provider will likely recommend that you see a rheumatologist, an arthritis specialist.  [facility] last reviewed this educational content on 6/1/2018 2000-2020 [employer]. [address],  [place] [address]. All rights reserved. This information is not intended as a substitute for professional medical care. Always follow your healthcare professional's instructions.           Patient Education     Shoulder Impingement Syndrome   The rotator cuff is a group of muscles and tendons that surround the shoulder joint. These muscles and tendons hold the arm in its joint. They help the shoulder move. The rotator cuff muscles and tendons can become irritated from repeated rubbing against the shoulder bone. This is called shoulder impingement syndrome or rotator cuff tendonitis.  If your case is mild, you may only need to rest the shoulder and then do certain exercises to strengthen the muscles. You can also take anti-inflammatory medicines. Steroid injections into the shoulder can ease inflammation. But you can have only a limited number of these. If the condition gets worse, your shoulder muscles may become thin and weak. This can lead to a rotator cuff tear.  Symptoms of shoulder impingement syndrome may include:    Shoulder pain that gets worse when you raise your arm overhead    Weakness of the shoulder muscles when you use your arm overhead    Popping and clicking when you move your shoulder    Shoulder pain that wakes you up at night, especially when you sleep on the affected shoulder    Sudden pain in your shoulder when you lift or reach    Pain that spreads from the front of the shoulder to the side of the arm  Home care  Follow these tips to take care of yourself at home:    Don't do activities that make your pain worse. These include raising your arms overhead, repeating the same motion over and over, or lifting heavy objects.    Don t hold your arm in one position for a long time. Keep it moving.    Put an ice pack on the sore area for 20 minutes every 1 to 2 hours for the first day. You can make an ice pack by putting ice cubes in a plastic bag. Wrap the bag in a thin towel before putting it on your  shoulder. A frozen bag of peas or something similar can also be used as an ice pack. Don't place the ice pack directly on your skin. Place a towel between it and your skin. Use the ice packs 3 to 4 times a day for the next 2 days. Continue using the ice to relieve the pain and swelling as needed.    You may take acetaminophen or ibuprofen to control pain, unless another medicine was prescribed. If prednisone was prescribed, don t take anti-inflammatory medicines. If you have chronic liver or kidney disease or ever had a stomach ulcer or gastrointestinal bleeding, talk with your doctor before using these medicines.    After your symptoms ease, you may get physical therapy or start a home exercise program. This can strengthen your shoulder muscles and help your range of motion. Talk with your doctor about what is best for your condition.  Follow-up care  Follow up with your healthcare provider, or as advised.  When to seek medical advice  Call your healthcare provider right away if any of these occur:    Shoulder pain that gets worse and wakes you up at night    Pain and weakness that gets worse when you reach up or raise your arms over your shoulders    Your shoulder or arm swells    Numbness, tingling, or pain that travels down the arm to the hand    Loss of shoulder strength    Fever or chills  [employer] last reviewed this educational content on 11/1/2019 2000-2020 [employer]. [address] [place] [address], [place] [address]. All rights reserved. This information is not intended as a substitute for professional medical care. Always follow your healthcare professional's instructions.

## 2021-04-05 ENCOUNTER — IMMUNIZATION (OUTPATIENT)
Dept: NURSING | Facility: CLINIC | Age: 56
End: 2021-04-05
Payer: COMMERCIAL

## 2021-04-05 PROCEDURE — 91300 PR COVID VAC PFIZER DIL RECON 30 MCG/0.3 ML IM: CPT

## 2021-04-05 PROCEDURE — 0001A PR COVID VAC PFIZER DIL RECON 30 MCG/0.3 ML IM: CPT

## 2021-04-26 ENCOUNTER — IMMUNIZATION (OUTPATIENT)
Dept: NURSING | Facility: CLINIC | Age: 56
End: 2021-04-26
Attending: INTERNAL MEDICINE
Payer: COMMERCIAL

## 2021-04-26 PROCEDURE — 91300 PR COVID VAC PFIZER DIL RECON 30 MCG/0.3 ML IM: CPT

## 2021-04-26 PROCEDURE — 0002A PR COVID VAC PFIZER DIL RECON 30 MCG/0.3 ML IM: CPT

## 2021-08-23 ENCOUNTER — OFFICE VISIT (OUTPATIENT)
Dept: FAMILY MEDICINE | Facility: CLINIC | Age: 56
End: 2021-08-23
Payer: COMMERCIAL

## 2021-08-23 VITALS
BODY MASS INDEX: 23.02 KG/M2 | RESPIRATION RATE: 10 BRPM | DIASTOLIC BLOOD PRESSURE: 80 MMHG | TEMPERATURE: 98.6 F | HEART RATE: 80 BPM | OXYGEN SATURATION: 97 % | WEIGHT: 147 LBS | SYSTOLIC BLOOD PRESSURE: 122 MMHG

## 2021-08-23 DIAGNOSIS — M25.532 PAIN IN BOTH WRISTS: ICD-10-CM

## 2021-08-23 DIAGNOSIS — M25.511 ACUTE PAIN OF RIGHT SHOULDER: ICD-10-CM

## 2021-08-23 DIAGNOSIS — M25.531 PAIN IN BOTH WRISTS: ICD-10-CM

## 2021-08-23 DIAGNOSIS — Z13.6 CARDIOVASCULAR SCREENING; LDL GOAL LESS THAN 160: ICD-10-CM

## 2021-08-23 DIAGNOSIS — R23.8 PAPULE OF SKIN: ICD-10-CM

## 2021-08-23 DIAGNOSIS — Z11.59 NEED FOR HEPATITIS C SCREENING TEST: Primary | ICD-10-CM

## 2021-08-23 DIAGNOSIS — M25.572 PAIN IN JOINT INVOLVING ANKLE AND FOOT, LEFT: ICD-10-CM

## 2021-08-23 LAB
CRP SERPL-MCNC: 7.2 MG/L (ref 0–8)
ERYTHROCYTE [DISTWIDTH] IN BLOOD BY AUTOMATED COUNT: 12.8 % (ref 10–15)
ERYTHROCYTE [SEDIMENTATION RATE] IN BLOOD BY WESTERGREN METHOD: 12 MM/HR (ref 0–20)
HCT VFR BLD AUTO: 47.9 % (ref 40–53)
HCV AB SERPL QL IA: NONREACTIVE
HGB BLD-MCNC: 16.4 G/DL (ref 13.3–17.7)
MCH RBC QN AUTO: 30.4 PG (ref 26.5–33)
MCHC RBC AUTO-ENTMCNC: 34.2 G/DL (ref 31.5–36.5)
MCV RBC AUTO: 89 FL (ref 78–100)
PLATELET # BLD AUTO: 201 10E3/UL (ref 150–450)
RBC # BLD AUTO: 5.4 10E6/UL (ref 4.4–5.9)
WBC # BLD AUTO: 6.6 10E3/UL (ref 4–11)

## 2021-08-23 PROCEDURE — 86431 RHEUMATOID FACTOR QUANT: CPT | Performed by: FAMILY MEDICINE

## 2021-08-23 PROCEDURE — 36415 COLL VENOUS BLD VENIPUNCTURE: CPT | Performed by: FAMILY MEDICINE

## 2021-08-23 PROCEDURE — 84550 ASSAY OF BLOOD/URIC ACID: CPT | Performed by: FAMILY MEDICINE

## 2021-08-23 PROCEDURE — 86140 C-REACTIVE PROTEIN: CPT | Performed by: FAMILY MEDICINE

## 2021-08-23 PROCEDURE — 99214 OFFICE O/P EST MOD 30 MIN: CPT | Performed by: FAMILY MEDICINE

## 2021-08-23 PROCEDURE — 85652 RBC SED RATE AUTOMATED: CPT | Performed by: FAMILY MEDICINE

## 2021-08-23 PROCEDURE — 86039 ANTINUCLEAR ANTIBODIES (ANA): CPT | Performed by: FAMILY MEDICINE

## 2021-08-23 PROCEDURE — 85027 COMPLETE CBC AUTOMATED: CPT | Performed by: FAMILY MEDICINE

## 2021-08-23 PROCEDURE — 86803 HEPATITIS C AB TEST: CPT | Performed by: FAMILY MEDICINE

## 2021-08-23 PROCEDURE — 86038 ANTINUCLEAR ANTIBODIES: CPT | Performed by: FAMILY MEDICINE

## 2021-08-23 PROCEDURE — 80061 LIPID PANEL: CPT | Performed by: FAMILY MEDICINE

## 2021-08-23 PROCEDURE — 80053 COMPREHEN METABOLIC PANEL: CPT | Performed by: FAMILY MEDICINE

## 2021-08-23 RX ORDER — IBUPROFEN 600 MG/1
600 TABLET, FILM COATED ORAL EVERY 8 HOURS PRN
Qty: 30 TABLET | Refills: 0 | Status: SHIPPED | OUTPATIENT
Start: 2021-08-23 | End: 2024-05-02

## 2021-08-23 NOTE — PROGRESS NOTES
Assessment & Plan     Need for hepatitis C screening test    - Hepatitis C Screen Reflex to HCV RNA Quant and Genotype    Pain in joint involving ankle and foot, left  Wonder about inflammatory arthritis - no urinary or eye symptoms - has never had gout.     - REVIEW OF HEALTH MAINTENANCE PROTOCOL ORDERS  - Uric acid  - CRP, inflammation  - Rheumatoid factor  - ESR: Erythrocyte sedimentation rate  - Anti Nuclear Nuvia IgG by IFA with Reflex    Pain in both wrists  See above    - REVIEW OF HEALTH MAINTENANCE PROTOCOL ORDERS  - Uric acid  - CRP, inflammation  - Rheumatoid factor  - ESR: Erythrocyte sedimentation rate  - Anti Nuclear Nuvia IgG by IFA with Reflex  - CBC with platelets    CARDIOVASCULAR SCREENING; LDL GOAL LESS THAN 160    - Comprehensive metabolic panel (BMP + Alb, Alk Phos, ALT, AST, Total. Bili, TP)  - Lipid panel reflex to direct LDL Fasting    Acute pain of right shoulder  This was last winter and is mostly better now with steroids and ibuprofen    - ibuprofen (ADVIL/MOTRIN) 600 MG tablet; Take 1 tablet (600 mg) by mouth every 8 hours as needed for moderate pain    Papule of skin  Will schedule removal        22 minutes spent on the date of the encounter doing chart review, patient visit and documentation            Return in about 2 weeks (around 9/6/2021) for biopsy of right foot - 20 minute slot.    Lucinda Badillo MD  [facility] DONNA Rodriguez is a 56 year old who presents for the following health issues - he has been having trouble with some joints.   His left ankle  Is hurting for the last 2 months.   He wakes and it is very stiff and painful.   After a few hours it is better but not enough that it still does not cause him to limp.    He has not hurt the ankle that he is aware.   At times it is puffy and red.    He has used tylenol and ibuprofen.   The ibuprofen helps.   He had right shoulder pain last winter and got rx for this and prednisone.  Both helped.    The shoulder is much better but not all the way.   His wrists are hurting too.   He had CTS surgery on both but wonders if it is coming back.   He occasionally feels numbness in the fingers.       He also mentions a bump on the top of his right foot which has been there about a year.   It does not hurt and has not grown anymore    HPI     Musculoskeletal problem/pain  Onset/Duration: 2-3 months  Description  Location: foot - bilateral  Joint Swelling: YES  Redness: no  Pain: YES  Warmth: no  Intensity:  mild  Progression of Symptoms:  worsening  Accompanying signs and symptoms:   Fevers: no  Numbness/tingling/weakness: no  History  Trauma to the area: no  Recent illness:  no  Previous similar problem: no  Previous evaluation:  no  Precipitating or alleviating factors:  Aggravating factors include: walking  Therapies tried and outcome: acetaminophen    Past Medical History:   Diagnosis Date     CARDIOVASCULAR SCREENING; LDL GOAL LESS THAN 160 10/31/2010    3.7% 10 yr risk 2016      Diverticulitis of sigmoid colon 2019    also diverticula found on colonscopy in 8/2019     Hepatic hemangioma      Plantar warts 10/4/2013     Premature ejaculation      RA (rheumatoid arthritis) (H) 12/31/2014     SKIN SENSATION DISTURB 12/25/2006    rt  hand, cts  rt wrist      Snoring 10/4/2013     Varicose veins of lower extremities with complications 12/25/2006     Problem list name updated by automated process. Provider to review       Past Surgical History:   Procedure Laterality Date     AS REPAIR  ANAL FISTULA,RECT ADV FLAP  2008    Dr. Blair Bishop     CARPAL TUNNEL RELEASE RT/LT  2019    both sides     COLONOSCOPY N/A 8/16/2019    [facility] - repeat in 10 years     varicose vein left side         MEDICATIONS:  Current Outpatient Medications   Medication     ibuprofen (ADVIL/MOTRIN) 600 MG tablet     esomeprazole (NEXIUM) 20 MG DR capsule     fluticasone (FLONASE) 50 MCG/ACT nasal spray     No current facility-administered  medications for this visit.       SOCIAL HISTORY:  Social History     Tobacco Use     Smoking status: Never Smoker     Smokeless tobacco: Never Used   Substance Use Topics     Alcohol use: Yes     Comment: socially, 4-5 drinks on the weekend       Family History   Problem Relation Age of Onset     Thyroid Disease Mother         underactive      Hypertension Mother      Cerebrovascular Disease Father 67         of complications of stroke     Family History Negative Maternal Grandmother      Myocardial Infarction Maternal Grandfather      Family History Negative Paternal Grandmother      Family History Negative Paternal Grandfather      Family History Negative Sister         1     Family History Negative Brother         1- at age of 37-from cancer ?origin      Cancer - colorectal No family hx of      Prostate Cancer No family hx of            Review of Systems   Constitutional, HEENT, cardiovascular, pulmonary, gi and gu systems are negative, except as otherwise noted.      Objective    /80 (BP Location: Right arm, Patient Position: Chair, Cuff Size: Adult Regular)   Pulse 80   Temp 98.6  F (37  C) (Oral)   Resp 10   Wt 66.7 kg (147 lb)   SpO2 97%   BMI 23.02 kg/m    Body mass index is 23.02 kg/m .  Physical Exam   GENERAL: healthy, alert and no distress  EYES: Eyes grossly normal to inspection, PERRL and conjunctivae and sclerae normal  NECK: no adenopathy, no asymmetry, masses, or scars and thyroid normal to palpation  RESP: lungs clear to auscultation - no rales, rhonchi or wheezes  CV: regular rate and rhythm, normal S1 S2, no S3 or S4, no murmur, click or rub, no peripheral edema and peripheral pulses strong  MS: no gross musculoskeletal defects noted, no edema  SKIN: top of right foot is 4 mm firm warty feeling but red papule - very well circumscribed  NEURO: Normal strength and tone, mentation intact and speech normal  PSYCH: mentation appears normal, affect normal/bright  LYMPH: no  cervical, supraclavicular, axillary, or inguinal adenopathy    Office Visit on 02/21/2021   Component Date Value Ref Range Status     CRP Inflammation 02/21/2021 14.0* 0.0 - 8.0 mg/L Final     Sed Rate 02/21/2021 12  0 - 20 mm/h Final

## 2021-08-24 LAB
ALBUMIN SERPL-MCNC: 3.7 G/DL (ref 3.4–5)
ALP SERPL-CCNC: 66 U/L (ref 40–150)
ALT SERPL W P-5'-P-CCNC: 25 U/L (ref 0–70)
ANA PAT SER IF-IMP: ABNORMAL
ANA SER QL IF: ABNORMAL
ANA TITR SER IF: ABNORMAL {TITER}
ANION GAP SERPL CALCULATED.3IONS-SCNC: 1 MMOL/L (ref 3–14)
AST SERPL W P-5'-P-CCNC: 22 U/L (ref 0–45)
BILIRUB SERPL-MCNC: 0.7 MG/DL (ref 0.2–1.3)
BUN SERPL-MCNC: 13 MG/DL (ref 7–30)
CALCIUM SERPL-MCNC: 10.3 MG/DL (ref 8.5–10.1)
CHLORIDE BLD-SCNC: 105 MMOL/L (ref 94–109)
CHOLEST SERPL-MCNC: 206 MG/DL
CO2 SERPL-SCNC: 30 MMOL/L (ref 20–32)
CREAT SERPL-MCNC: 0.97 MG/DL (ref 0.66–1.25)
FASTING STATUS PATIENT QL REPORTED: YES
GFR SERPL CREATININE-BSD FRML MDRD: 88 ML/MIN/1.73M2
GLUCOSE BLD-MCNC: 94 MG/DL (ref 70–99)
HDLC SERPL-MCNC: 43 MG/DL
LDLC SERPL CALC-MCNC: 131 MG/DL
NONHDLC SERPL-MCNC: 163 MG/DL
POTASSIUM BLD-SCNC: 4.9 MMOL/L (ref 3.4–5.3)
PROT SERPL-MCNC: 8.2 G/DL (ref 6.8–8.8)
RHEUMATOID FACT SER NEPH-ACNC: 81 IU/ML
SODIUM SERPL-SCNC: 136 MMOL/L (ref 133–144)
TRIGL SERPL-MCNC: 159 MG/DL
URATE SERPL-MCNC: 5.1 MG/DL (ref 3.5–7.2)

## 2021-08-25 ENCOUNTER — TELEPHONE (OUTPATIENT)
Dept: FAMILY MEDICINE | Facility: CLINIC | Age: 56
End: 2021-08-25

## 2021-08-25 DIAGNOSIS — M25.50 MULTIPLE JOINT PAIN: Primary | ICD-10-CM

## 2021-08-25 NOTE — TELEPHONE ENCOUNTER
LM on VM to call back to triage [phone].  Clarisa NICOLE RN, BSN, PAL (Patient Advocate Liaison)  [facility]   [phone]

## 2021-08-25 NOTE — LETTER
August 26, 2021      Michael Mills   [address] [place] [id number]        Dear Michael,     The cholesterol is a little high and in the future you may require medication. Check yearly and at this time I do not feel medications are necessary.    The rheumatoid factor is positive and this may account for the joint issues. If you have not  received a call from a rheumatology  by September 2, please call [phone] and ask to talk with a triage nurse.     Sincerely,    Justino Jane MD/mikel

## 2021-08-25 NOTE — TELEPHONE ENCOUNTER
----- Message from Lucinda Badillo MD sent at 8/25/2021 12:10 PM CDT -----  Overall the labs are pretty good  The cholesterol is a little high and in the future, he may require medication.   Check yearly and at this time do not feel meds are necessary.  The rheumatoid factor is positive and this may account for the joint issues.    Would he like referral to rheumatologist?

## 2021-08-26 NOTE — TELEPHONE ENCOUNTER
Patient returns call. Informed and accepts referral to rheumatology. Unsure which diagnoses to attach to order. Please advise.    OK to leave rheumatology scheduling info on VM or with spouse.   Letter/results mailed per patient request.    Lonny Vargas RN

## 2021-08-30 NOTE — TELEPHONE ENCOUNTER
Called wife to notify. Wife states has phone number to call. Wife/patient will call clinic back if in need of further guidance or assistance.     LEISA RileyN, RN  [facility]

## 2021-09-01 ENCOUNTER — TELEPHONE (OUTPATIENT)
Dept: FAMILY MEDICINE | Facility: CLINIC | Age: 56
End: 2021-09-01

## 2021-09-01 DIAGNOSIS — R76.8 RHEUMATOID FACTOR POSITIVE: ICD-10-CM

## 2021-09-01 DIAGNOSIS — M25.50 MULTIPLE JOINT PAIN: Primary | ICD-10-CM

## 2021-09-01 NOTE — TELEPHONE ENCOUNTER
Wife called, looking for Rheumatology referral in [place] or [place]. Patient currently has referral for location in [place]. Instructed by Rheumatology  service to contact provider to send new referral.     Dr. Aida Hou - wife sees, would like patient to see. Wife would like phone call back when referral placed.    Routing to provider.     LEISA RileyN, RN  [facility]

## 2021-09-08 ENCOUNTER — OFFICE VISIT (OUTPATIENT)
Dept: FAMILY MEDICINE | Facility: CLINIC | Age: 56
End: 2021-09-08
Payer: COMMERCIAL

## 2021-09-08 VITALS
HEART RATE: 70 BPM | RESPIRATION RATE: 12 BRPM | DIASTOLIC BLOOD PRESSURE: 70 MMHG | OXYGEN SATURATION: 97 % | SYSTOLIC BLOOD PRESSURE: 110 MMHG | WEIGHT: 149 LBS | BODY MASS INDEX: 23.34 KG/M2 | TEMPERATURE: 98.5 F

## 2021-09-08 DIAGNOSIS — M25.511 ACUTE PAIN OF RIGHT SHOULDER: ICD-10-CM

## 2021-09-08 DIAGNOSIS — D49.2 ABNORMAL SKIN GROWTH: Primary | ICD-10-CM

## 2021-09-08 PROCEDURE — 11104 PUNCH BX SKIN SINGLE LESION: CPT | Performed by: FAMILY MEDICINE

## 2021-09-08 PROCEDURE — 88305 TISSUE EXAM BY PATHOLOGIST: CPT | Performed by: PATHOLOGY

## 2021-09-08 PROCEDURE — 99213 OFFICE O/P EST LOW 20 MIN: CPT | Mod: 25 | Performed by: FAMILY MEDICINE

## 2021-09-08 RX ORDER — PREDNISONE 20 MG/1
40 TABLET ORAL DAILY
Qty: 10 TABLET | Refills: 0 | Status: SHIPPED | OUTPATIENT
Start: 2021-09-08 | End: 2021-11-15

## 2021-09-08 NOTE — PROGRESS NOTES
Assessment & Plan     Abnormal skin growth    After informed consent was obtained, using Betadine for cleansing and 1% Lidocaine without epinephrine for anesthetic, with sterile technique, punch biopsy size 4 mm was performed.  2 4-0 ethilon sutures were used to approximate the wound edges.  Antibiotic dressing is applied, and wound care instructions provided.  Be alert for any signs of cutaneous infection. The procedure was well tolerated without complications. Follow up: The specimen is labelled and sent to pathology for evaluation., Return for suture removal in 9 days..  There was some oozing and a pressure dressing was placed    - CA PUNCH BIOPSY OF SKIN, FIRST/SINGLE LESION  - Surgical pathology exam    Acute pain of right shoulder  rx  - predniSONE (DELTASONE) 20 MG tablet; Take 2 tablets (40 mg) by mouth daily  The potential side effects of the prescription medication were discussed with the patient.     - MR Shoulder Right w/o Contrast; Future      30 minutes spent on the date of the encounter doing chart review, review of test results, patient visit and documentation            Return in about 2 months (around 11/8/2021) for Physical Exam.    Lucinda Badillo MD  [facility]    Pancho Rodriguez is a 56 year old who presents for the following health issues - He has a bump on the top of his right foot which has been there about a year.   It does not hurt and has not grown anymore.   His right shoulder is hurting again.   He had xray last winter.   He does have RA and is seeing joint MD in a month.    A prednisone burst helped last winter.   Ibuprofen is not doing much.       HPI     Derm: biopsy    Past Medical History:   Diagnosis Date     CARDIOVASCULAR SCREENING; LDL GOAL LESS THAN 160 10/31/2010    3.7% 10 yr risk 2016      Diverticulitis of sigmoid colon 2019    also diverticula found on colonscopy in 8/2019     Hepatic hemangioma      Plantar warts 10/4/2013     Premature  ejaculation      RA (rheumatoid arthritis) (H) 12/31/2014     SKIN SENSATION DISTURB 12/25/2006    rt  hand, cts  rt wrist      Snoring 10/4/2013     Varicose veins of lower extremities with complications 12/25/2006     Problem list name updated by automated process. Provider to review     Past Surgical History:   Procedure Laterality Date     AS REPAIR  ANAL FISTULA,RECT ADV FLAP  2008    Dr. Blair Bishop     CARPAL TUNNEL RELEASE RT/LT  2019    both sides     COLONOSCOPY N/A 8/16/2019    [facility] - repeat in 10 years     varicose vein left side       Current Outpatient Medications   Medication Sig Dispense Refill     predniSONE (DELTASONE) 20 MG tablet Take 2 tablets (40 mg) by mouth daily 10 tablet 0     esomeprazole (NEXIUM) 20 MG DR capsule Take 1 capsule (20 mg) by mouth every morning (before breakfast) Take 30-60 minutes before eating. 30 capsule 11     fluticasone (FLONASE) 50 MCG/ACT nasal spray Spray 1 spray into both nostrils daily       ibuprofen (ADVIL/MOTRIN) 600 MG tablet Take 1 tablet (600 mg) by mouth every 8 hours as needed for moderate pain 30 tablet 0         Review of Systems   Constitutional, HEENT, cardiovascular, pulmonary, gi and gu systems are negative, except as otherwise noted.      Objective    /70 (BP Location: Right arm, Patient Position: Chair, Cuff Size: Adult Regular)   Pulse 70   Temp 98.5  F (36.9  C) (Oral)   Resp 12   Wt 67.6 kg (149 lb)   SpO2 97%   BMI 23.34 kg/m    Body mass index is 23.34 kg/m .  Physical Exam   SKIN: top of right foot is 4 mm firm warty feeling but red papule - very well circumscribed  Right Shoulder exam:  There is no swelling, redness or gross deformity of the shoulder joints.  There is full range of motion with some pain in mid arch.  The empty can sign is +.  The impingement sign is +.  The rotater cuff strength is 5/5 with some pain with internal rotation and some with external rotation.      Office Visit on 08/23/2021   Component Date  Value Ref Range Status     Hepatitis C Antibody 08/23/2021 Nonreactive  Nonreactive Final     Uric Acid 08/23/2021 5.1  3.5 - 7.2 mg/dL Final     CRP Inflammation 08/23/2021 7.2  0.0 - 8.0 mg/L Final     Rheumatoid Factor 08/23/2021 81* <20 IU/mL Final     Erythrocyte Sedimentation Rate 08/23/2021 12  0 - 20 mm/hr Final     RENAE interpretation 08/23/2021 Borderline Positive* Negative Final      Negative:              <1:40  Borderline Positive:   1:40 - 1:80  Positive:              >1:80     RENAE pattern 1 08/23/2021 Homogeneous   Final     RENAE titer 1 08/23/2021 1:80   Final     Sodium 08/23/2021 136  133 - 144 mmol/L Final     Potassium 08/23/2021 4.9  3.4 - 5.3 mmol/L Final     Chloride 08/23/2021 105  94 - 109 mmol/L Final     Carbon Dioxide (CO2) 08/23/2021 30  20 - 32 mmol/L Final     Anion Gap 08/23/2021 1* 3 - 14 mmol/L Final     Urea Nitrogen 08/23/2021 13  7 - 30 mg/dL Final     Creatinine 08/23/2021 0.97  0.66 - 1.25 mg/dL Final     Calcium 08/23/2021 10.3* 8.5 - 10.1 mg/dL Final     Glucose 08/23/2021 94  70 - 99 mg/dL Final     Alkaline Phosphatase 08/23/2021 66  40 - 150 U/L Final     AST 08/23/2021 22  0 - 45 U/L Final     ALT 08/23/2021 25  0 - 70 U/L Final     Protein Total 08/23/2021 8.2  6.8 - 8.8 g/dL Final     Albumin 08/23/2021 3.7  3.4 - 5.0 g/dL Final     Bilirubin Total 08/23/2021 0.7  0.2 - 1.3 mg/dL Final     GFR Estimate 08/23/2021 88  >60 mL/min/1.73m2 Final    As of July 11, 2021, eGFR is calculated by the CKD-EPI creatinine equation, without race adjustment. eGFR can be influenced by muscle mass, exercise, and diet. The reported eGFR is an estimation only and is only applicable if the renal function is stable.     Cholesterol 08/23/2021 206* <200 mg/dL Final    Age 0-19 years  Desirable: <170 mg/dL  Borderline high:  170-199 mg/dl  High:            >199 mg/dl    Age 20 years and older  Desirable: <200 mg/dL     Triglycerides 08/23/2021 159* <150 mg/dL Final    0-9 years:  Normal:    Less  than 75 mg/dL  Borderline high:  75-99 mg/dL  High:             Greater than or equal to 100 mg/dL    0-19 years:  Normal:    Less than 90 mg/dL  Borderline high:   mg/dL  High:             Greater than or equal to 130 mg/dL    20 years and older:  Normal:    Less than 150 mg/dL  Borderline high:  150-199 mg/dL  High:             200-499 mg/dL  Very high:   Greater than or equal to 500 mg/dL     Direct Measure HDL 08/23/2021 43  >=40 mg/dL Final    0-19 years:       Greater than or equal to 45 mg/dL   Low: Less than 40 mg/dL   Borderline low: 40-44 mg/dL     20 years and older:   Female: Greater than or equal to 50 mg/dL   Male:   Greater than or equal to 40 mg/dL          LDL Cholesterol Calculated 08/23/2021 131* <=100 mg/dL Final    Age 0-19 years:  Desirable: 0-110 mg/dL   Borderline high: 110-129 mg/dL   High: >= 130 mg/dL    Age 20 years and older:  Desirable: <100mg/dL  Above desirable: 100-129 mg/dL   Borderline high: 130-159 mg/dL   High: 160-189 mg/dL   Very high: >= 190 mg/dL     Non HDL Cholesterol 08/23/2021 163* <130 mg/dL Final    0-19 years:  Desirable:          Less than 120 mg/dL  Borderline high:   120-144 mg/dL  High:                   Greater than or equal to 145 mg/dL    20 years and older:  Desirable:          130 mg/dL  Above Desirable: 130-159 mg/dL  Borderline high:   160-189 mg/dL  High:               190-219 mg/dL  Very high:     Greater than or equal to 220 mg/dL     Patient Fasting > 8hrs? 08/23/2021 Yes   Final     WBC Count 08/23/2021 6.6  4.0 - 11.0 10e3/uL Final     RBC Count 08/23/2021 5.40  4.40 - 5.90 10e6/uL Final     Hemoglobin 08/23/2021 16.4  13.3 - 17.7 g/dL Final     Hematocrit 08/23/2021 47.9  40.0 - 53.0 % Final     MCV 08/23/2021 89  78 - 100 fL Final     MCH 08/23/2021 30.4  26.5 - 33.0 pg Final     MCHC 08/23/2021 34.2  31.5 - 36.5 g/dL Final     RDW 08/23/2021 12.8  10.0 - 15.0 % Final     Platelet Count 08/23/2021 201  150 - 450 10e3/uL Final

## 2021-09-14 LAB
PATH REPORT.COMMENTS IMP SPEC: NORMAL
PATH REPORT.FINAL DX SPEC: NORMAL
PATH REPORT.GROSS SPEC: NORMAL
PATH REPORT.MICROSCOPIC SPEC OTHER STN: NORMAL
PATH REPORT.RELEVANT HX SPEC: NORMAL
PHOTO IMAGE: NORMAL

## 2021-09-15 ENCOUNTER — TELEPHONE (OUTPATIENT)
Dept: FAMILY MEDICINE | Facility: CLINIC | Age: 56
End: 2021-09-15

## 2021-09-15 NOTE — TELEPHONE ENCOUNTER
----- Message from Lucinda Badillo MD sent at 9/15/2021  9:09 AM CDT -----  The papule removed was a benign blood vessel or vascular lesion.    Sometimes these come back.   We will keep an eye on this.

## 2021-09-16 NOTE — TELEPHONE ENCOUNTER
Called patient and advised of below.  Patient coming for nurse visit for suture removal tomorrow.  Wanted to change to 3 pm.  Rescheduled nurse visit to 3 pm per patient request.  Codie Alford RN

## 2021-09-17 ENCOUNTER — ALLIED HEALTH/NURSE VISIT (OUTPATIENT)
Dept: FAMILY MEDICINE | Facility: CLINIC | Age: 56
End: 2021-09-17
Payer: COMMERCIAL

## 2021-09-17 DIAGNOSIS — Z48.02 VISIT FOR SUTURE REMOVAL: Primary | ICD-10-CM

## 2021-09-17 PROCEDURE — 99207 PR NO CHARGE NURSE ONLY: CPT

## 2021-09-17 NOTE — PROGRESS NOTES
Michael Gene presents to the clinic today for removal of sutures.  The patient has had the sutures in place for9 days.  There has been no history of infection or drainage.  2 sutures are seen located on the top of (R) foot.  The wound is healing well with no signs of infection.  Tetanus status is up to date.   All sutures were easily removed today.  Routine wound care discussed.  The patient will follow up as needed.    Codie Alford RN

## 2021-09-18 ENCOUNTER — HOSPITAL ENCOUNTER (OUTPATIENT)
Dept: MRI IMAGING | Facility: CLINIC | Age: 56
Discharge: HOME | End: 2021-09-18
Attending: FAMILY MEDICINE | Admitting: FAMILY MEDICINE
Payer: COMMERCIAL

## 2021-09-18 DIAGNOSIS — M25.511 ACUTE PAIN OF RIGHT SHOULDER: ICD-10-CM

## 2021-09-18 PROCEDURE — 73221 MRI JOINT UPR EXTREM W/O DYE: CPT | Mod: RT

## 2021-09-20 ENCOUNTER — TELEPHONE (OUTPATIENT)
Dept: FAMILY MEDICINE | Facility: CLINIC | Age: 56
End: 2021-09-20

## 2021-09-20 DIAGNOSIS — M25.511 ACUTE PAIN OF RIGHT SHOULDER: Primary | ICD-10-CM

## 2021-09-20 NOTE — TELEPHONE ENCOUNTER
----- Message from Lucinda Badillo MD sent at 9/20/2021  9:53 AM CDT -----  It looks like there is tendonitis and maybe a labral tear.   See if he would like to see ortho or first see his joint MD?

## 2021-09-20 NOTE — TELEPHONE ENCOUNTER
Patient returned phone call to clinic and was relayed message below. Patient would appreciate a referral to orthopedics. Advised patient they would call him in the next few business days to schedule an appointment.     Routing to provider to inform.     CRYSTAL Riley, RN  [facility]

## 2021-10-07 ENCOUNTER — OFFICE VISIT (OUTPATIENT)
Dept: ORTHOPEDICS | Facility: CLINIC | Age: 56
End: 2021-10-07
Payer: COMMERCIAL

## 2021-10-07 VITALS
HEIGHT: 66 IN | DIASTOLIC BLOOD PRESSURE: 84 MMHG | SYSTOLIC BLOOD PRESSURE: 118 MMHG | BODY MASS INDEX: 23.95 KG/M2 | WEIGHT: 149 LBS

## 2021-10-07 DIAGNOSIS — M25.511 ACUTE PAIN OF RIGHT SHOULDER: Primary | ICD-10-CM

## 2021-10-07 DIAGNOSIS — M77.8 SUBSCAPULARIS TENDINITIS, RIGHT: ICD-10-CM

## 2021-10-07 DIAGNOSIS — M75.91 SUPRASPINATUS TENDONITIS, RIGHT: ICD-10-CM

## 2021-10-07 DIAGNOSIS — M75.41 ROTATOR CUFF IMPINGEMENT SYNDROME OF RIGHT SHOULDER: ICD-10-CM

## 2021-10-07 DIAGNOSIS — M19.011 ARTHROSIS OF RIGHT ACROMIOCLAVICULAR JOINT: ICD-10-CM

## 2021-10-07 PROCEDURE — 99203 OFFICE O/P NEW LOW 30 MIN: CPT | Performed by: STUDENT IN AN ORGANIZED HEALTH CARE EDUCATION/TRAINING PROGRAM

## 2021-10-07 ASSESSMENT — MIFFLIN-ST. JEOR: SCORE: 1458.61

## 2021-10-07 NOTE — LETTER
10/7/2021         RE: Michael Mills  [address]  [facility] [address]        Dear Colleague,    Thank you for referring your patient, Michael Mills, to the [facility] [place]. Please see a copy of my visit note below.    ASSESSMENT & PLAN       1. Acute pain of right shoulder    2. Supraspinatus tendonitis, right    3.  4.  5. Rotator cuff impingement syndrome of right shoulder   Subscapularis tendinosis, right  Acromioclavicular arthrosis, right     Michael Mills is a 56 year old year old male presenting for evaluation of right shoulder pain x 8 months. History, examination and imaging are consistent with supraspinatus tendinosis with impingement. XR and MRI images were reviewed today. He additionally has evidence of AC joint arthrosis and mild subscapularis tendinosis which are mildly symptomatic on exam. Discussed treatment options inclusive of pain management (oral NSAIDS, topical Voltaren gel, steroid injection), activity modification and formal physical therapy.      At this time patient is agreeable to proceed with physical therapy for rotator cuff strengthening, scapular stabilization, ergonomics and soft tissue modalities.     For pain control, may use ibuprofen 400-600 mg with food every 4 hours as needed. May try Voltaren gel 4x per day when not using ibuprofen. May also try heat before activity and/or ice after activity. Discussed using Turmeric with black pepper (2-3g per day) and I would recommend the brand Pure Encapsulations. The product is called CurcumaSorb 180. Pure Encapsulations is of high quality and free of additives/allergens. Please take 2 tablets a day between meals. Fish oil also has anti-inflammatory benefits.     If unable to progress with PT secondary to pain then return for consideration of cortisone injection.      Plan to follow up after 6-8 weeks of physical therapy. If shoulder pain is not improved / improving throughout course of PT  then return for re-evaluation / consideration for advanced imaging.    Marie Orta MD, CAQSM  [facility]      -----    SUBJECTIVE  Michael Mills is a/an 56 year old Right handed male with history of rheumatoid arthritis who is seen in consultation at the request of  Lucinda Badillo M.D. for evaluation of chronic right shoulder pain. The patient is seen by themselves.    Onset: ~ 8 month(s) ago. Reports insidious onset without acute precipitating event.  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 Provider to review     Social History     Socioeconomic History     Marital status:      Spouse name: Lachelle     Number of children: 3     Years of education: Not on file     Highest education  "level: Not on file   Occupational History     Not on file   Tobacco Use     Smoking status: Never Smoker     Smokeless tobacco: Never Used   Substance and Sexual Activity     Alcohol use: Yes     Comment: socially, 4-5 drinks on the weekend     Drug use: No     Sexual activity: Yes     Partners: Female     Comment: wife hysterectomy   Other Topics Concern     Parent/sibling w/ CABG, MI or angioplasty before 65F 55M? No   Social History Narrative     since 1988    Working full time, working for [employer]    3 sons    No pets     Social Determinants of Health     Financial Resource Strain:      Difficulty of Paying Living Expenses:    Food Insecurity:      Worried About Running Out of Food in the Last Year:      Ran Out of Food in the Last Year:    Transportation Needs:      Lack of Transportation (Medical):      Lack of Transportation (Non-Medical):    Physical Activity:      Days of Exercise per Week:      Minutes of Exercise per Session:    Stress:      Feeling of Stress :    Social Connections:      Frequency of Communication with Friends and Family:      Frequency of Social Gatherings with Friends and Family:      Attends Muslim Services:      Active Member of Clubs or Organizations:      Attends Club or Organization Meetings:      Marital Status:    Intimate Partner Violence:      Fear of Current or Ex-Partner:      Emotionally Abused:      Physically Abused:      Sexually Abused:          Patient's past medical, surgical, social, and family histories were reviewed today and no changes are noted.    REVIEW OF SYSTEMS:  10 point ROS is negative other than symptoms noted above in HPI, Past Medical History or as stated below  Constitutional: NEGATIVE for fever, chills, change in weight  Skin: NEGATIVE for worrisome rashes, moles or lesions  GI/: NEGATIVE for bowel or bladder changes  Neuro: NEGATIVE for weakness, dizziness or paresthesias    OBJECTIVE:  /84   Ht 1.676 m (5' 6\")   Wt 67.6 " kg (149 lb)   BMI 24.05 kg/m     General: healthy, alert and in no distress  HEENT: no scleral icterus or conjunctival erythema  Skin: no suspicious lesions or rash. No jaundice.  CV: regular rhythm by palpation  Resp: normal respiratory effort without conversational dyspnea   Psych: normal mood and affect  Gait: normal steady gait with appropriate coordination and balance  Neuro: normal light touch sensory exam of the bilateral upper extremities.    MSK:  RIGHT SHOULDER  Inspection:    no swelling, bruising, discoloration, or obvious deformity or asymmetry  Palpation:    Tender about the AC joint, greater tuberosity and supraspinatus insertion. Remainder of bony and tendinous landmarks are nontender.  Active Range of Motion:     Abduction 1650, FF 1800, , IR T10.      Scapular dyskinesis absent  Strength:    Scapular plane abduction 5/5 (painful),  ER 5/5, IR 5/5 (painful), biceps 5/5, triceps 5/5  Special Tests:    Positive: Neer's, Crystal', supraspinatus (empty can), drop arm/painful arc, crossed arm adduction    Negative: Clarendon's, apprehension/relocation, sulcus sign, load and shift, clunk, Regla 1/2, Speed's and Yergason's    Independent visualization of the below image:    Results for orders placed or performed during the hospital encounter of 09/18/21   MR Shoulder Right w/o Contrast    Narrative    EXAM: MR SHOULDER RIGHT W/O CONTRAST  LOCATION: [facility]  DATE/TIME: 9/18/2021 7:15 AM    FINDINGS:    ROTATOR CUFF:  -Supraspinatus: Mild tendinopathy. No discrete tear.  -Infraspinatus: No significant tendinopathy or discrete tear.  -Subscapularis: Mild tendinopathy. No discrete tear.  -Teres minor: No tendinopathy or discrete tear.    No significant rotator cuff muscle atrophy, fatty infiltration, or intramuscular edema.    CORACOACROMIAL ARCH:  -Morphology: Type II acromion. Intact coracoacromial and coracoclavicular ligaments. No subacromial spurring.   -Bursa: Edema or trace  fluid in the subacromial-subdeltoid bursa.    ACROMIOCLAVICULAR JOINT:   -Mild arthrosis.     LONG HEAD OF BICEPS TENDON:   -Mild tendinopathy without discrete tear or reno detachment.    GLENOHUMERAL JOINT:   -Labrum: Question nondisplaced posteroinferior labral tear at 7:00. Blunting and fraying of portions of the superior and posterosuperior labrum.   -Cartilage: No focal glenohumeral cartilage defect identified. No significant subchondral marrow edema.   -Joint space: No significant glenohumeral joint effusion.  -Glenohumeral ligaments and capsule: No pericapsular inflammation.    BONES:   -No acute shoulder fracture. No concerning bone lesion. No osteonecrosis. Benign enthesopathic change at the posterior greater tuberosity.    SOFT TISSUES:   -Normal deltoid muscle bulk. Normal visualized chest wall and axilla.        IMPRESSION:  1.  Mild right supraspinatus and subscapularis tendinopathy.  2.  No discrete or significant rotator cuff tear.  3.  Mild acromioclavicular joint arthrosis with mild long head biceps tendinopathy and minimal subacromial-subdeltoid bursitis.  4.  Question small nondisplaced posteroinferior labral tear with blunting and fraying of the superior and posterosuperior labrum.     SHOULDER RIGHT THREE OR MORE VIEWS  2/21/2021 12:22 PM                                   IMPRESSION: Acromioclavicular and glenohumeral joints are  unremarkable. No evidence of fracture.  MD Marie NÚÑEZ MD, [facility]        Again, thank you for allowing me to participate in the care of your patient.        Sincerely,        Marie Orta MD

## 2021-10-07 NOTE — PATIENT INSTRUCTIONS
1. Acute pain of right shoulder    2. Supraspinatus tendonitis, right    3. Rotator cuff impingement syndrome of right shoulder      Michael Mills is a 56 year old year old male presenting for evaluation of right shoulder pain x 8 months. History, examination and imaging are consistent with supraspinatus tendinosis with impingement. XR and MRI images were reviewed today. Discussed treatment options inclusive of pain management (oral NSAIDS, topical Voltaren gel, steroid injection), activity modification and formal physical therapy.      At this time patient is agreeable to proceed with physical therapy for rotator cuff strengthening, scapular stabilization, ergonomics and soft tissue modalities.     For pain control, may use ibuprofen 400-600 mg with food every 4 hours as needed. May try Voltaren gel 4x per day when not using ibuprofen. May also try heat before activity and/or ice after activity. Discussed using Turmeric with black pepper (2-3g per day) and I would recommend the brand Pure Encapsulations. The product is called [employer] 180. Pure Encapsulations is of high quality and free of additives/allergens. Please take 2 tablets a day between meals. Fish oil also has anti-inflammatory benefits.     If unable to progress with PT secondary to pain then return for consideration of cortisone injection.      Plan to follow up after 6-8 weeks of physical therapy. If shoulder pain is not improved / improving throughout course of PT then return for re-evaluation / consideration for advanced imaging.    Call direct clinic number [[phone]] at any time with questions or concerns.    Marie Orta MD, [facility]      Rotator Cuff Injury     WHAT IS A ROTATOR CUFF INJURY?    A rotator cuff injury is irritation of or damage to the group of tendons and muscles that hold your shoulder joint together. Tendons are strong bands of tissue that attach muscle to bone. You  use the muscles and tendons in your shoulder joint to move your shoulder and raise your arm over your head.        WHAT IS THE CAUSE?    A rotator cuff injury can be caused by:    Overuse of your shoulder in a sport or work activity that involves repetitive overhead movement of your shoulder, like swimming, baseball (mainly pitching), football, tennis, painting, plastering, or housework.  A sudden activity that twists your shoulder or tears your tendon, such as using your arm to break a fall, falling onto your arm, or lifting a heavy object  You may be at higher risk for a rotator cuff injury if you have poor head and shoulder posture, especially if you are older.    WHAT ARE THE SYMPTOMS?    Symptoms may include:    Pain and weakness in your arm and shoulder  Loss of shoulder movement, especially when you try to raise your arm overhead    HOW IS IT DIAGNOSED?    Your healthcare provider will ask about your symptoms, activities, and medical history and examine you. You may have X-rays or other scans or procedures, such as:    An ultrasound, which uses sound waves to show pictures of your shoulder joint  An MRI, which uses a strong magnetic field and radio waves to show detailed pictures of your shoulder joint  An arthrogram, which is an X-ray or MRI taken after a dye is injected into your joint to outline its shape  Arthroscopy, which is a type of surgery done with a small scope inserted into your joint so your provider can look directly at your joint    HOW IS IT TREATED?    You will need to change or stop doing the activities that cause pain until your injury has healed. For example, avoid strenuous activity or any overhead motion that causes pain. Also, try to make sure that you are practicing good posture and are not slouching forward.    Your healthcare provider may recommend stretching and strengthening exercises to help you heal.    If you have a bad tear, you may need to have it repaired with surgery. After  surgery, your treatment plan will include physical therapy to strengthen your shoulder as it heals.    The pain often gets better within a few weeks with self-care, but some injuries may take several months or longer to heal. It s important to follow all of your healthcare provider s instructions.    HOW CAN I TAKE CARE OF MYSELF?    To keep swelling down and help relieve pain:    Put an ice pack, gel pack, or package of frozen vegetables wrapped in a cloth on the area every 3 to 4 hours for up to 20 minutes at a time.  Take nonprescription pain medicine, such as acetaminophen, ibuprofen, or naproxen. Nonsteroidal anti-inflammatory medicines (NSAIDs), such as ibuprofen and naproxen, may cause stomach bleeding and other problems. These risks increase with age. Read the label and take as directed. Unless recommended by your healthcare provider, you should not take this medicine for more than 10 days.  Moist heat may help relieve pain, relax your muscles, and make it easier to move your arm and shoulder. Put moist heat on the injured area for 10 to 15 minutes before you do warm-up and stretching exercises. Moist heat includes heat patches or moist heating pads that you can buy at most drugstores, a warm wet washcloth, or a hot shower. To prevent burns to your skin, follow directions on the package and do not lie on any type of hot pad. Don t use heat if you have swelling.    Follow your healthcare provider's instructions, including any exercises recommended by your provider. Ask your provider:    How and when you will hear your test results  How long it will take to recover  What activities you should avoid, including how much you can lift, and when you can return to your normal activities  How to take care of yourself at home  What symptoms or problems you should watch for and what to do if you have them  Make sure you know when you should come back for a checkup.    HOW CAN I HELP PREVENT A ROTATOR CUFF  INJURY?    Warm-up exercises and stretching before activities can help prevent injuries. For example, do exercises that strengthen your shoulder muscles. If your arm or shoulder hurts after exercise, putting ice on it may help keep it from getting injured.    Follow safety rules and use any protective equipment recommended for your work or sport.    Avoid activities that cause pain. For example, avoid lifting heavy objects over your head.    Developed by [employer].  Published by [employer].  Copyright  2014 [employer] and/or one of its subsidiaries. All rights reserved.    Rotator Cuff Exercises    Isometric shoulder external rotation:  a doorway with your elbow bent 90 degrees and the back of the wrist on your injured side pressed against the door frame. Try to press your hand outward into the door frame. Hold for 5 seconds. Do 2 sets of 15.    Isometric shoulder internal rotation:  a doorway with your elbow bent 90 degrees and the front of the wrist on your injured side pressed against the door frame. Try to press your palm into the door frame. Hold for 5 seconds. Do 2 sets of 15.  Wand exercise, flexion: Stand upright and hold a stick in both hands, palms down. Stretch your arms by lifting them over your head, keeping your arms straight. Hold for 5 seconds and return to the starting position. Repeat 10 times.    Wand exercise, extension: Stand upright and hold a stick in both hands behind your back. Move the stick away from your back. Hold this position for 5 seconds. Relax and return to the starting position. Repeat 10 times.  Wand exercise, external rotation: Lie on your back and hold a stick in both hands, palms up. Your upper arms should be resting on the floor with your elbows at your sides and bent 90 degrees. Use your uninjured arm to push your injured arm out away from your body. Keep the elbow of your injured arm at your side while it is being pushed. Hold the stretch for 5  seconds. Repeat 10 times.    Wand exercise, shoulder abduction and adduction: Stand and hold a stick with both hands, palms facing away from your body. Rest the stick against the front of your thighs. Use your uninjured arm to push your injured arm out to the side and up as high as possible. Keep your arms straight. Hold for 5 seconds. Repeat 10 times.    Resisted shoulder external rotation: Stand sideways next to a door with your injured arm farther from the door. Tie a knot in the end of the tubing and shut the knot in the door at waist level (or use cable weight machine at gym). Hold the other end of the tubing with the hand of your injured arm. Rest the hand of your injured arm across your stomach. Keeping your elbow in at your side, rotate your arm outward and away from your waist. Slowly return your arm to the starting position. Make sure you keep your elbow bent 90 degrees and your forearm parallel to the floor. Repeat 10 times. Build up to 2 sets of 15.    Resisted shoulder internal rotation: Stand sideways next to a door with your injured arm closest to the door. Tie a knot in the end of the tubing and shut the knot in the door at waist level (or use cable weight machine at gym). Hold the other end of the tubing with the hand of your injured arm. Bend the elbow of your injured arm 90 degrees. Keeping your elbow in at your side, rotate your forearm across your body and then slowly back to the starting position. Make sure you keep your forearm parallel to the floor. Do 2 sets of 8 to 12.    Scaption: Stand with your arms at your sides and with your elbows straight. Slowly raise your arms to eye level. As you raise your arms, spread them apart so that they are only slightly in front of your body (at about a 30-degree angle to the front of your body). Point your thumbs toward the ceiling. Hold for 2 seconds and lower your arms slowly. Do 2 sets of 15. Progress to holding a soup can or light weight when you are  "doing the exercise and increase the weight as the exercise gets easier.    Side-lying external rotation: Lie on your uninjured side with your injured arm at your side and your elbow bent 90 degrees. Keeping your elbow against your side, raise your forearm toward the ceiling and hold for 2 seconds. Slowly lower your arm. Do 2 sets of 15. You can start doing this exercise holding a soup can or light weight and gradually increase the weight as long as there is no pain.    Horizontal abduction: Lie on your stomach on a table or the edge of a bed with the arm on your injured side hanging down over the edge. Raise your arm out to the side, with your thumb pointed toward the ceiling, until your arm is parallel to the floor. Hold for 2 seconds and then lower it slowly. Start this exercise with no weight. As you get stronger, add a light weight or hold a soup can. Do 2 sets of 15.    Push-up with a plus: Begin on the floor on your hands and knees. Keep your hands a shoulder width apart and lift your feet off the floor. Arch your back as high as possible and round your shoulders (this is the \"plus\" part or the exercise). Bend your elbows and lower your body to the floor. Return to the starting position and arch your back again. Do 2 sets of 15.          "

## 2021-10-07 NOTE — PROGRESS NOTES
ASSESSMENT & PLAN       1. Acute pain of right shoulder    2. Supraspinatus tendonitis, right    3.  4.  5. Rotator cuff impingement syndrome of right shoulder   Subscapularis tendinosis, right  Acromioclavicular arthrosis, right     Michael Mills is a 56 year old year old male presenting for evaluation of right shoulder pain x 8 months. History, examination and imaging are consistent with supraspinatus tendinosis with impingement. XR and MRI images were reviewed today. He additionally has evidence of AC joint arthrosis and mild subscapularis tendinosis which are mildly symptomatic on exam. Discussed treatment options inclusive of pain management (oral NSAIDS, topical Voltaren gel, steroid injection), activity modification and formal physical therapy.      At this time patient is agreeable to proceed with physical therapy for rotator cuff strengthening, scapular stabilization, ergonomics and soft tissue modalities.     For pain control, may use ibuprofen 400-600 mg with food every 4 hours as needed. May try Voltaren gel 4x per day when not using ibuprofen. May also try heat before activity and/or ice after activity. Discussed using Turmeric with black pepper (2-3g per day) and I would recommend the brand Pure Encapsulations. The product is called [employer] 180. Pure Encapsulations is of high quality and free of additives/allergens. Please take 2 tablets a day between meals. Fish oil also has anti-inflammatory benefits.     If unable to progress with PT secondary to pain then return for consideration of cortisone injection.      Plan to follow up after 6-8 weeks of physical therapy. If shoulder pain is not improved / improving throughout course of PT then return for re-evaluation / consideration for advanced imaging.    Marie Orta MD, [facility]      -----    SUBJECTIVE  Michael Mills is a/an 56 year old Right handed male with history of rheumatoid  arthritis who is seen in consultation at the request of  Lucinda Badillo M.D. for evaluation of chronic right shoulder pain. The patient is seen by themselves.    Onset: ~ 8 month(s) ago. Reports insidious onset without acute precipitating event.  Location of Pain: right lateral shoulder and upper arm  Rating of Pain at worst: 10+/10  Rating of Pain Currently: 5/10  Worsened by: sleeping on right side, reaching overhead, pushing, pulling  Better with: rest / activity avoidance, ibuprofen  Treatments tried: rest/activity avoidance, ice, ibuprofen, other medications: Prednisone (Medrol) and previous imaging (MRI 9/18/21 and xray 2/21/21)  Associated symptoms: intermittent tingling in bilateral hand  Orthopedic history: YES - patient also complains of left shoulder pain  Relevant surgical history: YES - bilateral carpal tunnel release  Social history: 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 Provider to review     Social History     Socioeconomic History     Marital status:      Spouse name: Lachelle     Number of children: 3     Years of education: Not on file     Highest education level: Not on file   Occupational History     Not on file   Tobacco Use     Smoking status: Never Smoker     Smokeless tobacco: Never Used   Substance and Sexual Activity     Alcohol use: Yes     Comment: socially, 4-5 drinks on the weekend     Drug use: No      "Sexual activity: Yes     Partners: Female     Comment: wife hysterectomy   Other Topics Concern     Parent/sibling w/ CABG, MI or angioplasty before 65F 55M? No   Social History Narrative     since 1988    Working full time, working for [employer]    3 sons    No pets     Social Determinants of Health     Financial Resource Strain:      Difficulty of Paying Living Expenses:    Food Insecurity:      Worried About Running Out of Food in the Last Year:      Ran Out of Food in the Last Year:    Transportation Needs:      Lack of Transportation (Medical):      Lack of Transportation (Non-Medical):    Physical Activity:      Days of Exercise per Week:      Minutes of Exercise per Session:    Stress:      Feeling of Stress :    Social Connections:      Frequency of Communication with Friends and Family:      Frequency of Social Gatherings with Friends and Family:      Attends Sikhism Services:      Active Member of Clubs or Organizations:      Attends Club or Organization Meetings:      Marital Status:    Intimate Partner Violence:      Fear of Current or Ex-Partner:      Emotionally Abused:      Physically Abused:      Sexually Abused:          Patient's past medical, surgical, social, and family histories were reviewed today and no changes are noted.    REVIEW OF SYSTEMS:  10 point ROS is negative other than symptoms noted above in HPI, Past Medical History or as stated below  Constitutional: NEGATIVE for fever, chills, change in weight  Skin: NEGATIVE for worrisome rashes, moles or lesions  GI/: NEGATIVE for bowel or bladder changes  Neuro: NEGATIVE for weakness, dizziness or paresthesias    OBJECTIVE:  /84   Ht 1.676 m (5' 6\")   Wt 67.6 kg (149 lb)   BMI 24.05 kg/m     General: healthy, alert and in no distress  HEENT: no scleral icterus or conjunctival erythema  Skin: no suspicious lesions or rash. No jaundice.  CV: regular rhythm by palpation  Resp: normal respiratory effort without " conversational dyspnea   Psych: normal mood and affect  Gait: normal steady gait with appropriate coordination and balance  Neuro: normal light touch sensory exam of the bilateral upper extremities.    MSK:  RIGHT SHOULDER  Inspection:    no swelling, bruising, discoloration, or obvious deformity or asymmetry  Palpation:    Tender about the AC joint, greater tuberosity and supraspinatus insertion. Remainder of bony and tendinous landmarks are nontender.  Active Range of Motion:     Abduction 1650, FF 1800, , IR T10.      Scapular dyskinesis absent  Strength:    Scapular plane abduction 5/5 (painful),  ER 5/5, IR 5/5 (painful), biceps 5/5, triceps 5/5  Special Tests:    Positive: Neer's, Crystal', supraspinatus (empty can), drop arm/painful arc, crossed arm adduction    Negative: [place]'s, apprehension/relocation, sulcus sign, load and shift, clunk, Regla 1/2, Speed's and Yergason's    Independent visualization of the below image:    Results for orders placed or performed during the hospital encounter of 09/18/21   MR Shoulder Right w/o Contrast    Narrative    EXAM: MR SHOULDER RIGHT W/O CONTRAST  LOCATION: [facility]  DATE/TIME: 9/18/2021 7:15 AM    FINDINGS:    ROTATOR CUFF:  -Supraspinatus: Mild tendinopathy. No discrete tear.  -Infraspinatus: No significant tendinopathy or discrete tear.  -Subscapularis: Mild tendinopathy. No discrete tear.  -Teres minor: No tendinopathy or discrete tear.    No significant rotator cuff muscle atrophy, fatty infiltration, or intramuscular edema.    CORACOACROMIAL ARCH:  -Morphology: Type II acromion. Intact coracoacromial and coracoclavicular ligaments. No subacromial spurring.   -Bursa: Edema or trace fluid in the subacromial-subdeltoid bursa.    ACROMIOCLAVICULAR JOINT:   -Mild arthrosis.     LONG HEAD OF BICEPS TENDON:   -Mild tendinopathy without discrete tear or reno detachment.    GLENOHUMERAL JOINT:   -Labrum: Question nondisplaced  posteroinferior labral tear at 7:00. Blunting and fraying of portions of the superior and posterosuperior labrum.   -Cartilage: No focal glenohumeral cartilage defect identified. No significant subchondral marrow edema.   -Joint space: No significant glenohumeral joint effusion.  -Glenohumeral ligaments and capsule: No pericapsular inflammation.    BONES:   -No acute shoulder fracture. No concerning bone lesion. No osteonecrosis. Benign enthesopathic change at the posterior greater tuberosity.    SOFT TISSUES:   -Normal deltoid muscle bulk. Normal visualized chest wall and axilla.        IMPRESSION:  1.  Mild right supraspinatus and subscapularis tendinopathy.  2.  No discrete or significant rotator cuff tear.  3.  Mild acromioclavicular joint arthrosis with mild long head biceps tendinopathy and minimal subacromial-subdeltoid bursitis.  4.  Question small nondisplaced posteroinferior labral tear with blunting and fraying of the superior and posterosuperior labrum.     SHOULDER RIGHT THREE OR MORE VIEWS  2/21/2021 12:22 PM                                   IMPRESSION: Acromioclavicular and glenohumeral joints are  unremarkable. No evidence of fracture.  MD Marie NÚÑEZ MD, [facility]

## 2021-10-19 ENCOUNTER — VIRTUAL VISIT (OUTPATIENT)
Dept: RHEUMATOLOGY | Facility: CLINIC | Age: 56
End: 2021-10-19
Payer: COMMERCIAL

## 2021-10-19 ENCOUNTER — TELEPHONE (OUTPATIENT)
Dept: RHEUMATOLOGY | Facility: CLINIC | Age: 56
End: 2021-10-19

## 2021-10-19 DIAGNOSIS — M25.50 MULTIPLE JOINT PAIN: Primary | ICD-10-CM

## 2021-10-19 DIAGNOSIS — Z98.890 HISTORY OF CARPAL TUNNEL RELEASE OF BOTH WRISTS: ICD-10-CM

## 2021-10-19 DIAGNOSIS — R76.8 RHEUMATOID FACTOR POSITIVE: ICD-10-CM

## 2021-10-19 DIAGNOSIS — R76.8 POSITIVE ANA (ANTINUCLEAR ANTIBODY): ICD-10-CM

## 2021-10-19 PROCEDURE — 99205 OFFICE O/P NEW HI 60 MIN: CPT | Mod: 95 | Performed by: INTERNAL MEDICINE

## 2021-10-19 NOTE — PROGRESS NOTES
Michael Mills who presents today with a chief complaint of  No chief complaint on file.      Joint Pains: Yes  Location: knees and hands  Onset: few months  Intensity: 5 /10  AM Stiffness: 10 Minutes  Alleviating/Aggravating Factors: working increase pain. Medications helpful?  Tolerating Meds: Yes  Other:      ROS:  Patient denies having: persistent dry eyes, dry mouth, recurrent oral ulcers, patchy alopecia, active rashes, photosensitivity, history of psoriasis, active chest pain, active shortness of breath, active cough, active dysuria, +history of kidney stones, active abdominal pain, active diarrhea, history of hematochezia, active dysphagia, history of peptic ulcer disease, history of HIV, tuberculosis, hepatitis B or C, Lyme disease, seizure history, raynaud's, active documented fevers, recent infections, difficulty sleeping or chronic unrefreshing sleep, involuntary weight loss, loss of appetite, excessive fatigue, depression, anxiety,  recurrent sinus infections, history of inflammatory eye diseases (such as uveitis, scleritis, iritis, etc).     Information gathered by medical assistant incorporated into this note, was reviewed and discussed with the patient.    Problem List:  Patient Active Problem List   Diagnosis     Varicose veins of lower extremities with complications     Premature ejaculation     CARDIOVASCULAR SCREENING; LDL GOAL LESS THAN 160     Snoring     Pain in limb     Plantar warts     RA (rheumatoid arthritis) (H)     Diverticulosis     History of nephrolithiasis     Varicose veins of other specified sites        PMH:   Past Medical History:   Diagnosis Date     CARDIOVASCULAR SCREENING; LDL GOAL LESS THAN 160 10/31/2010    3.7% 10 yr risk 2016      Diverticulitis of sigmoid colon 2019    also diverticula found on colonscopy in 8/2019     Hepatic hemangioma      Plantar warts 10/4/2013     Premature ejaculation      RA (rheumatoid arthritis) (H) 12/31/2014     SKIN SENSATION DISTURB  2006    rt  hand, cts  rt wrist      Snoring 10/4/2013     Varicose veins of lower extremities with complications 2006     Problem list name updated by automated process. Provider to review       Surgical History:  Past Surgical History:   Procedure Laterality Date     AS REPAIR  ANAL FISTULA,RECT ADV FLAP      Dr. Blair Bishop     CARPAL TUNNEL RELEASE RT/LT      both sides     COLONOSCOPY N/A 2019    [facility] - repeat in 10 years     varicose vein left side         Family History:  Family History   Problem Relation Age of Onset     Thyroid Disease Mother         underactive      Hypertension Mother      Cerebrovascular Disease Father 67         of complications of stroke     Family History Negative Maternal Grandmother      Myocardial Infarction Maternal Grandfather      Family History Negative Paternal Grandmother      Family History Negative Paternal Grandfather      Family History Negative Sister         1     Family History Negative Brother         1- at age of 37-from cancer ?origin      Cancer - colorectal No family hx of      Prostate Cancer No family hx of        Social History:   reports that he has never smoked. He has never used smokeless tobacco. He reports current alcohol use. He reports that he does not use drugs.    Allergies:  Allergies   Allergen Reactions     No Known Drug Allergies         Current Medications:  Current Outpatient Medications   Medication Sig Dispense Refill     esomeprazole (NEXIUM) 20 MG DR capsule Take 1 capsule (20 mg) by mouth every morning (before breakfast) Take 30-60 minutes before eating. 30 capsule 11     fluticasone (FLONASE) 50 MCG/ACT nasal spray Spray 1 spray into both nostrils daily       ibuprofen (ADVIL/MOTRIN) 600 MG tablet Take 1 tablet (600 mg) by mouth every 8 hours as needed for moderate pain 30 tablet 0     predniSONE (DELTASONE) 20 MG tablet Take 2 tablets (40 mg) by mouth daily (Patient not taking: Reported on 10/7/2021)  10 tablet 0           Physical Exam:  Following up today via video visit, per Covid-19 pandemic requirements.    Verbal consent has been obtained for this service by care team member.    Video call start time: 3:50 PM    Video call end time:  4:30 PM    Dox[employer] utilized for video call.    Phone number utilized: [phone]     Patient location for video visit: Home     Provider location for video visit:  Home (working remotely)        Summary/Assessment:    Pleasant 56-year-old male presents with chronic multiple joint pains, elevated rheumatoid factor and elevated RENAE.    Patient states he has been experiencing joint pains for few years however, has noticed worsening since February 2021 particular involving hands/wrists and knees.    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    States pains are more persistent as opposed to waxing and waning pattern.    On physical exam patient points to PIP joints as area where he experiences most of his pains and sometimes involving palmar surface of CMC joint region.  Some subtle hypertrophic changes appreciated on video exam involving PIP joints with some questionable superimposed subtle fullness particularly involving right third/fourth and left fourth/fifth PIPs.  Self palpating these joints reproduce some mild discomfort.  Self palpating DIPs on the right did not reproduce any tenderness, palpating DIPs of the left reproduce some mild tenderness.  No clear signs of fullness noted involving MCPs and wrist joints.  States sometimes notices some fullness involving wrist joints that come and go.    Denies family history for connective tissue disease.    Denies personal or family history for psoriasis.    Denies history of blood clots.    States carpal tunnel release surgery was beneficial initially, now wonders if contributing to some of his wrist/hand pains.  Patient has difficulty differentiating between prior carpal  tunnel pains and current pains.  Admits to having some paresthesias in the past in addition to some joint pains.    Denies having any x-rays of affected joints.    Denies trying physical therapy in the past.    Noted to have slightly elevated calcium level when corrected with albumin level.  Patient takes a daily multivitamin for men which contains 200 mg of calcium and 1000 units of vitamin D.  Denies having calcium rich dairy diet.  Denies having many green vegetables in diet.  On review of systems admits to having prior history of kidney stone.    Has about 6-7 alcoholic beverages on some weekends.  Noted to have normal uric acid level.    For pain relief takes either 600 mg of ibuprofen or 2 tablets Tylenol which provides some benefit.  States ibuprofen can sometimes provide greater than 80% relief.  Patient does not take these meds regularly.    States is about 5 foot 6-7 and weighs 139 pounds.    Given the above, difficult to tell at this time with certainty as to what the primary process is contributing to his symptoms described.  Evolving rheumatoid arthritis in the differential given elevated rheumatoid factor however this is not a very specific test.  Degenerative joint disease also differential given extensive manual labor described.  Given borderline elevated RENAE other evolving connective tissue diseases also with differential however level is low titer and often there are false positives with low titer levels.  Will obtain some lab/x-rays and correlate clinically to screen for possible underlying etiologies.    Please see below for management plan.    Pertinent rheumatology/past medical history (please refer to above for more detailed history):      Chronic multiple joint pains (hands, wrists, knees and feet)    Elevated rheumatoid factor    Elevated RENAE (borderline, 1-80 titer with homogeneous pattern)    History of carpal tunnel release, bilateral (2019)    History of nephrolithiasis      Rheumatology  medications provided/suggested:    Tylenol  Ibuprofen  Diclofenac gel      Pertinent medication from other providers or from otc (please refer to above for more detailed med list):    Nexium      Pertinent medications already tried:           Pertinent lab history:    Positive/elevated: Rheumatoid factor, RENAE      Pertinent imaging/test history:          Other:    Marital status:       How many kids:  3    Type of work:  Yes,      Drinking alcohol: yes, few beers on the weekend    Tobacco use: no       Recreational drug use: no       Plan:      For arthralgias suggested for taking Tylenol 1000 mg daily-twice daily as needed.    For pain not responsive to Tylenol can take ibuprofen 400-800 mg twice daily-3 times daily as needed.  Made aware that ibuprofen needs to be taken with food.    Also suggested as an alternative for isolated joint pains can utilize over-the-counter diclofenac gel 1-4 times a day as needed.    Will obtain x-rays of: Bilateral hands/wrists, knees and feet.    Will obtain some labs today correlate clinically.    Follow-up in 2 to 4 months, preferable in clinic.      Procedure note:     Total time spent 65 minutes involved with patient care, includes placing orders, reviewing records and formulating management plan.      Major side effect profile of medications provided/suggested were discussed with the patient.    This note was transcribed using Dragon voice recognition software as a result unintentional grammatical errors or word substitutions may have occurred. Please contact our Rheumatology department if you need any clarification or if you have any related inquiries.    Thank you for referring this patient to our clinic.      Kingsley Schwarz DO  ....................  10/19/2021   11:50 AM

## 2021-10-19 NOTE — PATIENT INSTRUCTIONS
[facility]  GI CONSULT NOTE    REQUESTING PROVIDER:  Dr Gibbons    CHIEF COMPLAINT:  Colonoscopy and EGD     SUBJECTIVE:    Renetta Carlos is a 32 year old female   See GI consult      MEDICATIONS:       Current Outpatient Medications   Medication Sig Dispense Refill   • meloxicam (MOBIC) 7.5 MG tablet Take 1 tablet by mouth daily. 5 tablet 0   • norethindrone (MICRONOR) 0.35 MG tablet Take 1 tablet by mouth daily. 30 tablet 11   • omeprazole (PriLOSEC) 40 MG capsule Take 1 capsule by mouth daily. 30 minutes before food 30 capsule 3   • orlistat (XENICAL) 120 MG capsule Take 1 capsule by mouth 3 times daily (with meals). 90 capsule 3   • amLODIPine (NORVASC) 5 MG tablet Take 1 tablet by mouth daily. 90 tablet 3   • chlorthalidone (THALITONE) 25 MG tablet Take 1 tablet by mouth daily. 90 tablet 3   • metFORMIN (GLUCOPHAGE) 500 MG tablet Take 1 tablet by mouth 2 times daily (with meals). 120 tablet 11   • predniSONE (DELTASONE) 20 MG tablet Take 1 tablet by mouth daily. 17 tablet 0   • Valacyclovir HCl 1000 MG Tab TAKE 1/2 TABLET BY MOUTH AT BEDTIME 90 tablet 3   • valACYclovir (VALTREX) 500 MG tablet Take 500 mg by mouth daily.     • amLODIPine (NORVASC) 5 MG tablet Take 1 tablet by mouth daily. 90 tablet 3   • albuterol (ProAir RespiClick) 108 (90 Base) MCG/ACT inhaler Inhale 2 puffs into the lungs every 4 hours as needed for Shortness of Breath or Wheezing. 1 each 3   • metFORMIN (GLUCOPHAGE) 500 MG tablet Take 1 tablet by mouth 2 times daily (with meals). 120 tablet 11   • chlorthalidone (THALITONE) 25 MG tablet Take 1 tablet by mouth daily. 90 tablet 3   • polyethylene glycol (MIRALAX) 17 g packet Take 17 g by mouth daily. 28 each 11   • fluticasone (FLONASE) 50 MCG/ACT nasal spray Spray 2 sprays in each nostril daily. 16 g 12   • naproxen (NAPROSYN) 250 MG tablet Take 1 tablet by mouth 2 times daily (with meals). 14 tablet 1     No current facility-administered medications for this visit.   Summary of Your Rheumatology Visit    Next Appointment:  2-4 Months, preferable clinic.      Medications:      Referrals:      Tests:        Injections:      Other:               PROBLEM LIST:                  Patient Active Problem List   Diagnosis   • Hypertension   • GERD (gastroesophageal reflux disease)   • Morbid obesity (CMS/HCC)   • Obstructive sleep apnea syndrome   • PCOS (polycystic ovarian syndrome)   • Bronchitis with bronchospasm   • Grief   • Pregnancy examination or test, negative result   • Screen for STD (sexually transmitted disease)   • Irritable bowel syndrome with both constipation and diarrhea   • Irritable bowel syndrome with diarrhea       HISTORIES:    ALLERGIES:  No Known Allergies  Past Medical History:   Diagnosis Date   • Essential (primary) hypertension      Past Surgical History:   Procedure Laterality Date   • Joint replacement Bilateral     hip with pins     Social History     Tobacco Use   • Smoking status: Never Smoker   • Smokeless tobacco: Never Used   Substance Use Topics   • Alcohol use: Yes     Alcohol/week: 1.0 standard drinks     Types: 1 Shots of liquor per week     Frequency: Monthly or less   • Drug use: No        Family History   Problem Relation Age of Onset   • Asthma Mother    • Sickle Cell Disease Mother    • Hypertension Father    • Asthma Father    • Congestive Heart Failure Sister    • Asthma Sister    • Multiple Sclerosis Paternal Grandmother    :    REVIEW OF SYSTEMS:   Review of Systems   All other systems reviewed and are negative.         PHYSICAL EXAM:       Physical Exam   Constitutional: She is oriented to person, place, and time. She appears well-developed and well-nourished.   HENT:   Head: Normocephalic and atraumatic.   Neck: Normal range of motion. Neck supple.   Cardiovascular: Normal rate, regular rhythm and normal heart sounds.   Pulmonary/Chest: Effort normal and breath sounds normal.   Abdominal: Soft. Bowel sounds are normal.   Neurological: She is alert and oriented to person, place, and time.   Skin: Skin is warm and dry.         ASSESSMENT:       1.Change in bowel habits  2. Lower abdominal pain  3. Early  satiety  4. Nausea and vomiting      PLAN:       Colonoscopy and egd at [facility]    1/12/2021  Daryl Wakefield MD

## 2021-10-22 ENCOUNTER — THERAPY VISIT (OUTPATIENT)
Dept: PHYSICAL THERAPY | Facility: CLINIC | Age: 56
End: 2021-10-22
Payer: COMMERCIAL

## 2021-10-22 DIAGNOSIS — M25.511 ACUTE PAIN OF RIGHT SHOULDER: ICD-10-CM

## 2021-10-22 DIAGNOSIS — M75.91 SUPRASPINATUS TENDONITIS, RIGHT: ICD-10-CM

## 2021-10-22 DIAGNOSIS — M75.41 ROTATOR CUFF IMPINGEMENT SYNDROME OF RIGHT SHOULDER: ICD-10-CM

## 2021-10-22 DIAGNOSIS — M19.011 ARTHROSIS OF RIGHT ACROMIOCLAVICULAR JOINT: ICD-10-CM

## 2021-10-22 DIAGNOSIS — M77.8 SUBSCAPULARIS TENDINITIS, RIGHT: ICD-10-CM

## 2021-10-22 PROCEDURE — 97161 PT EVAL LOW COMPLEX 20 MIN: CPT | Mod: GP | Performed by: PHYSICAL THERAPIST

## 2021-10-22 PROCEDURE — 97110 THERAPEUTIC EXERCISES: CPT | Mod: GP | Performed by: PHYSICAL THERAPIST

## 2021-10-22 PROCEDURE — 97035 APP MDLTY 1+ULTRASOUND EA 15: CPT | Mod: GP | Performed by: PHYSICAL THERAPIST

## 2021-10-22 PROCEDURE — 97010 HOT OR COLD PACKS THERAPY: CPT | Mod: GP | Performed by: PHYSICAL THERAPIST

## 2021-10-22 NOTE — PROGRESS NOTES
Physical Therapy Initial Evaluation  Subjective:  Pt describes ongoing right shoulder pain that began insidiously in February of 2021.  Has slowly become worse.  Constant pain of variable severity up to 8/10.  Worse lying on the shoulder, lifting overhead.  Had an MRI recently (results in Epic).      The history is provided by the patient.   Therapist Generated HPI Evaluation         Type of problem:  Right shoulder.    This is a new condition.  Condition occurred with:  Unknown cause.  Where condition occurred: for unknown reasons.  Patient reports pain:  Lateral and anterior.  Pain is described as aching and stabbing and is constant.  Pain radiates to:  Upper arm. Pain is the same all the time.  Since onset symptoms are gradually worsening.  Symptoms are exacerbated by lifting, lying on extremity and using arm overhead  and relieved by rest.  Special tests included:  MRI and x-ray.  Past treatment: none.   Restrictions due to condition include:  Working in normal job without restrictions.  Barriers include:  None as reported by patient.                        Objective:  System                   Shoulder Evaluation:  ROM:  AROM:    Flexion:  Right:  Full with pain above 100 degrees    Abduction:  Right:  Full with pain above 100 degrees          Horizontal Adduction:  Right:  Slight loss vs the left        Extension/Internal Rotation:  Right:  Slight loss vs the left    PROM:            Internal Rotation:  Left:  70    Right:  50  External Rotation:  Left:  100    Right:  80                    Strength:    Flexion: Right: 5/5      Pain:  -/+    Abduction:  Right: 5/5      Pain:-/+    Internal Rotation:  Right: 5/5      Pain:-/+  External Rotation:   Right:5/5      Pain:-/+        Elbow Flexion:  Right:5/5      Pain:-/+      Special Tests:      Right shoulder positive for the following special tests:Impingement  Right shoulder negative for the following special tests:Rotator cuff tear  Palpation:   normal      Mobility Tests:    Glenohumeral anterior left:  Normal  Glenohumeral anterior right:  Hypomobile  Glenohumeral posterior left:  Normal  Glenohumeral posterior right:  Hypomobile  Glenohumeral inferior left:  Normal  Glenohumeral inferior right:  Normal                                             General     ROS    Assessment/Plan:    Patient is a 56 year old male with right side shoulder complaints.    Patient has the following significant findings with corresponding treatment plan.                Diagnosis 1:  Right shoulder impingement/tendonitis  Pain -  hot/cold therapy, US and home program  Decreased ROM/flexibility - manual therapy, therapeutic exercise and home program    Therapy Evaluation Codes:   1) History comprised of:   Personal factors that impact the plan of care:      None.    Comorbidity factors that impact the plan of care are:      None.     Medications impacting care: None.  2) Examination of Body Systems comprised of:   Body structures and functions that impact the plan of care:      Shoulder.   Activity limitations that impact the plan of care are:      Dressing, Lifting, Working and Sleeping.  3) Clinical presentation characteristics are:   Stable/Uncomplicated.  4) Decision-Making    Low complexity using standardized patient assessment instrument and/or measureable assessment of functional outcome.  Cumulative Therapy Evaluation is: Low complexity.    Previous and current functional limitations:  (See Goal Flow Sheet for this information)    Short term and Long term goals: (See Goal Flow Sheet for this information)     Communication ability:  Patient appears to be able to clearly communicate and understand verbal and written communication and follow directions correctly.  Treatment Explanation - The following has been discussed with the patient:   RX ordered/plan of care  Anticipated outcomes  Possible risks and side effects  This patient would benefit from PT intervention to  resume normal activities.   Rehab potential is good.    Frequency:  1 X week, once daily  Duration:  for 6 weeks  Discharge Plan:  Achieve all LTG.  Independent in home treatment program.  Reach maximal therapeutic benefit.    Please refer to the daily flowsheet for treatment today, total treatment time and time spent performing 1:1 timed codes.

## 2021-10-25 NOTE — PROGRESS NOTES
Physical Therapy Initial Evaluation  Subjective:  The history is provided by the patient.   Patient Health History  Michael Mills being seen for Right Shoulder.     Date of Onset: Febuary 2021.      Pain is reported as 5/10 on pain scale.  General health as reported by patient is good.                  Current occupation is  .                                       Objective:  System    Physical Exam    General     ROS    Assessment/Plan:

## 2021-11-05 ENCOUNTER — HOSPITAL ENCOUNTER (OUTPATIENT)
Dept: GENERAL RADIOLOGY | Facility: CLINIC | Age: 56
Discharge: HOME | End: 2021-11-05
Attending: INTERNAL MEDICINE | Admitting: INTERNAL MEDICINE
Payer: COMMERCIAL

## 2021-11-05 DIAGNOSIS — M25.50 MULTIPLE JOINT PAIN: ICD-10-CM

## 2021-11-05 PROCEDURE — 73130 X-RAY EXAM OF HAND: CPT | Mod: 50

## 2021-11-05 PROCEDURE — 73110 X-RAY EXAM OF WRIST: CPT | Mod: 50

## 2021-11-05 PROCEDURE — 73560 X-RAY EXAM OF KNEE 1 OR 2: CPT | Mod: 50

## 2021-11-05 PROCEDURE — 73630 X-RAY EXAM OF FOOT: CPT | Mod: 50

## 2021-11-08 ENCOUNTER — THERAPY VISIT (OUTPATIENT)
Dept: PHYSICAL THERAPY | Facility: CLINIC | Age: 56
End: 2021-11-08
Payer: COMMERCIAL

## 2021-11-08 DIAGNOSIS — M25.511 ACUTE PAIN OF RIGHT SHOULDER: ICD-10-CM

## 2021-11-08 PROCEDURE — 97010 HOT OR COLD PACKS THERAPY: CPT | Mod: GP | Performed by: PHYSICAL THERAPIST

## 2021-11-08 PROCEDURE — 97035 APP MDLTY 1+ULTRASOUND EA 15: CPT | Mod: GP | Performed by: PHYSICAL THERAPIST

## 2021-11-08 PROCEDURE — 97110 THERAPEUTIC EXERCISES: CPT | Mod: GP | Performed by: PHYSICAL THERAPIST

## 2021-11-15 ENCOUNTER — OFFICE VISIT (OUTPATIENT)
Dept: FAMILY MEDICINE | Facility: CLINIC | Age: 56
End: 2021-11-15
Payer: COMMERCIAL

## 2021-11-15 VITALS
DIASTOLIC BLOOD PRESSURE: 80 MMHG | BODY MASS INDEX: 23.78 KG/M2 | OXYGEN SATURATION: 100 % | SYSTOLIC BLOOD PRESSURE: 132 MMHG | HEART RATE: 74 BPM | HEIGHT: 66 IN | TEMPERATURE: 97.8 F | WEIGHT: 148 LBS

## 2021-11-15 DIAGNOSIS — R76.8 POSITIVE ANA (ANTINUCLEAR ANTIBODY): ICD-10-CM

## 2021-11-15 DIAGNOSIS — R76.8 RHEUMATOID FACTOR POSITIVE: ICD-10-CM

## 2021-11-15 DIAGNOSIS — Z00.00 ROUTINE GENERAL MEDICAL EXAMINATION AT A HEALTH CARE FACILITY: Primary | ICD-10-CM

## 2021-11-15 DIAGNOSIS — M25.50 MULTIPLE JOINT PAIN: ICD-10-CM

## 2021-11-15 LAB
ALBUMIN UR-MCNC: NEGATIVE MG/DL
APPEARANCE UR: CLEAR
BACTERIA #/AREA URNS HPF: ABNORMAL /HPF
BILIRUB UR QL STRIP: NEGATIVE
COLOR UR AUTO: YELLOW
ERYTHROCYTE [SEDIMENTATION RATE] IN BLOOD BY WESTERGREN METHOD: 13 MM/HR (ref 0–20)
GLUCOSE UR STRIP-MCNC: NEGATIVE MG/DL
HGB UR QL STRIP: NEGATIVE
KETONES UR STRIP-MCNC: NEGATIVE MG/DL
LEUKOCYTE ESTERASE UR QL STRIP: NEGATIVE
NITRATE UR QL: NEGATIVE
PH UR STRIP: 7 [PH] (ref 5–7)
RBC #/AREA URNS AUTO: ABNORMAL /HPF
SP GR UR STRIP: 1.01 (ref 1–1.03)
SQUAMOUS #/AREA URNS AUTO: ABNORMAL /LPF
UROBILINOGEN UR STRIP-ACNC: 0.2 E.U./DL
WBC #/AREA URNS AUTO: ABNORMAL /HPF

## 2021-11-15 PROCEDURE — 85652 RBC SED RATE AUTOMATED: CPT | Performed by: FAMILY MEDICINE

## 2021-11-15 PROCEDURE — 86200 CCP ANTIBODY: CPT | Performed by: FAMILY MEDICINE

## 2021-11-15 PROCEDURE — 86235 NUCLEAR ANTIGEN ANTIBODY: CPT | Mod: 59 | Performed by: FAMILY MEDICINE

## 2021-11-15 PROCEDURE — 82310 ASSAY OF CALCIUM: CPT | Performed by: FAMILY MEDICINE

## 2021-11-15 PROCEDURE — 85730 THROMBOPLASTIN TIME PARTIAL: CPT | Performed by: FAMILY MEDICINE

## 2021-11-15 PROCEDURE — 82306 VITAMIN D 25 HYDROXY: CPT | Performed by: FAMILY MEDICINE

## 2021-11-15 PROCEDURE — 99000 SPECIMEN HANDLING OFFICE-LAB: CPT | Performed by: FAMILY MEDICINE

## 2021-11-15 PROCEDURE — 86160 COMPLEMENT ANTIGEN: CPT | Performed by: FAMILY MEDICINE

## 2021-11-15 PROCEDURE — 84550 ASSAY OF BLOOD/URIC ACID: CPT | Performed by: FAMILY MEDICINE

## 2021-11-15 PROCEDURE — 82040 ASSAY OF SERUM ALBUMIN: CPT | Performed by: FAMILY MEDICINE

## 2021-11-15 PROCEDURE — 36415 COLL VENOUS BLD VENIPUNCTURE: CPT | Performed by: FAMILY MEDICINE

## 2021-11-15 PROCEDURE — 81001 URINALYSIS AUTO W/SCOPE: CPT | Performed by: FAMILY MEDICINE

## 2021-11-15 PROCEDURE — 90471 IMMUNIZATION ADMIN: CPT | Performed by: FAMILY MEDICINE

## 2021-11-15 PROCEDURE — 83970 ASSAY OF PARATHORMONE: CPT | Performed by: FAMILY MEDICINE

## 2021-11-15 PROCEDURE — 86140 C-REACTIVE PROTEIN: CPT | Performed by: FAMILY MEDICINE

## 2021-11-15 PROCEDURE — 85613 RUSSELL VIPER VENOM DILUTED: CPT | Performed by: FAMILY MEDICINE

## 2021-11-15 PROCEDURE — 86147 CARDIOLIPIN ANTIBODY EA IG: CPT | Mod: 59 | Performed by: FAMILY MEDICINE

## 2021-11-15 PROCEDURE — 90750 HZV VACC RECOMBINANT IM: CPT | Performed by: FAMILY MEDICINE

## 2021-11-15 PROCEDURE — 82164 ANGIOTENSIN I ENZYME TEST: CPT | Mod: 90 | Performed by: FAMILY MEDICINE

## 2021-11-15 PROCEDURE — 99396 PREV VISIT EST AGE 40-64: CPT | Mod: 25 | Performed by: FAMILY MEDICINE

## 2021-11-15 PROCEDURE — 85390 FIBRINOLYSINS SCREEN I&R: CPT | Performed by: PATHOLOGY

## 2021-11-15 PROCEDURE — 86160 COMPLEMENT ANTIGEN: CPT | Mod: 59 | Performed by: FAMILY MEDICINE

## 2021-11-15 PROCEDURE — 86225 DNA ANTIBODY NATIVE: CPT | Performed by: FAMILY MEDICINE

## 2021-11-15 PROCEDURE — 82043 UR ALBUMIN QUANTITATIVE: CPT | Performed by: FAMILY MEDICINE

## 2021-11-15 RX ORDER — ACETAMINOPHEN 500 MG
1000 TABLET ORAL EVERY 12 HOURS PRN
COMMUNITY
Start: 2021-11-15

## 2021-11-15 SDOH — HEALTH STABILITY: PHYSICAL HEALTH: ON AVERAGE, HOW MANY MINUTES DO YOU ENGAGE IN EXERCISE AT THIS LEVEL?: 20 MIN

## 2021-11-15 SDOH — HEALTH STABILITY: PHYSICAL HEALTH: ON AVERAGE, HOW MANY DAYS PER WEEK DO YOU ENGAGE IN MODERATE TO STRENUOUS EXERCISE (LIKE A BRISK WALK)?: 2 DAYS

## 2021-11-15 SDOH — ECONOMIC STABILITY: FOOD INSECURITY: WITHIN THE PAST 12 MONTHS, THE FOOD YOU BOUGHT JUST DIDN'T LAST AND YOU DIDN'T HAVE MONEY TO GET MORE.: PATIENT DECLINED

## 2021-11-15 SDOH — ECONOMIC STABILITY: FOOD INSECURITY: WITHIN THE PAST 12 MONTHS, YOU WORRIED THAT YOUR FOOD WOULD RUN OUT BEFORE YOU GOT MONEY TO BUY MORE.: PATIENT DECLINED

## 2021-11-15 SDOH — ECONOMIC STABILITY: INCOME INSECURITY: IN THE LAST 12 MONTHS, WAS THERE A TIME WHEN YOU WERE NOT ABLE TO PAY THE MORTGAGE OR RENT ON TIME?: NO

## 2021-11-15 SDOH — ECONOMIC STABILITY: INCOME INSECURITY: HOW HARD IS IT FOR YOU TO PAY FOR THE VERY BASICS LIKE FOOD, HOUSING, MEDICAL CARE, AND HEATING?: NOT HARD AT ALL

## 2021-11-15 ASSESSMENT — ENCOUNTER SYMPTOMS
DIZZINESS: 0
FEVER: 0
ABDOMINAL PAIN: 0
MYALGIAS: 0
HEMATOCHEZIA: 0
HEADACHES: 0
FREQUENCY: 0
SORE THROAT: 0
NERVOUS/ANXIOUS: 0
HEARTBURN: 0
SHORTNESS OF BREATH: 0
DIARRHEA: 0
CHILLS: 0
PALPITATIONS: 0
DYSURIA: 0
PARESTHESIAS: 0
NAUSEA: 0
COUGH: 0
HEMATURIA: 0
WEAKNESS: 0
JOINT SWELLING: 0
CONSTIPATION: 0
ARTHRALGIAS: 1
EYE PAIN: 0

## 2021-11-15 ASSESSMENT — SOCIAL DETERMINANTS OF HEALTH (SDOH)
DO YOU BELONG TO ANY CLUBS OR ORGANIZATIONS SUCH AS CHURCH GROUPS UNIONS, FRATERNAL OR ATHLETIC GROUPS, OR SCHOOL GROUPS?: NO
HOW OFTEN DO YOU ATTEND CHURCH OR RELIGIOUS SERVICES?: NEVER
HOW OFTEN DO YOU GET TOGETHER WITH FRIENDS OR RELATIVES?: TWICE A WEEK
IN A TYPICAL WEEK, HOW MANY TIMES DO YOU TALK ON THE PHONE WITH FAMILY, FRIENDS, OR NEIGHBORS?: ONCE A WEEK

## 2021-11-15 ASSESSMENT — LIFESTYLE VARIABLES
HOW OFTEN DO YOU HAVE SIX OR MORE DRINKS ON ONE OCCASION: NEVER
HOW OFTEN DO YOU HAVE A DRINK CONTAINING ALCOHOL: 2-4 TIMES A MONTH
HOW MANY STANDARD DRINKS CONTAINING ALCOHOL DO YOU HAVE ON A TYPICAL DAY: 1 OR 2

## 2021-11-15 ASSESSMENT — MIFFLIN-ST. JEOR: SCORE: 1454.07

## 2021-11-15 NOTE — PROGRESS NOTES
SUBJECTIVE:   CC: Michael Mills is an 56 year old male who presents for preventative health visit.     He is here for physical.   He saw rheum for virtual visit.   He was dx with RA in 2014 but right now is only on tylenol and advil.   He has pain in multiple joints.   He had xrays done but has not heard about the results.   He had future labs put in but has not done them yet.        He has no concerns today.   He would like to get his second shingles vaccine.   He will come back for flu and covid shots.       The 10-year ASCVD risk score (Joy VOSS Jr., et al., 2013) is: 6.4%    Values used to calculate the score:      Age: 56 years      Sex: Male      Is Non- : No      Diabetic: No      Tobacco smoker: No      Systolic Blood Pressure: 132 mmHg      Is BP treated: No      HDL Cholesterol: 43 mg/dL      Total Cholesterol: 206 mg/dL          {  Patient has been advised of split billing requirements and indicates understanding: Yes  Healthy Habits:     Getting at least 3 servings of Calcium per day:  Yes    Bi-annual eye exam:  NO    Dental care twice a year:  Yes    Sleep apnea or symptoms of sleep apnea:  None    Diet:  Other    Frequency of exercise:  1 day/week    Duration of exercise:  Other    Taking medications regularly:  Yes    Medication side effects:  None    PHQ-2 Total Score: 0    Additional concerns today:  No          Shingles second vaccine    Today's PHQ-2 Score:   PHQ-2 ( 1999 [employer]) 11/15/2021   Q1: Little interest or pleasure in doing things 0   Q2: Feeling down, depressed or hopeless 0   PHQ-2 Score 0   Q1: Little interest or pleasure in doing things Not at all   Q2: Feeling down, depressed or hopeless Not at all   PHQ-2 Score 0       Abuse: Current or Past(Physical, Sexual or Emotional)- No  Do you feel safe in your environment? Yes    Have you ever done Advance Care Planning? (For example, a Health Directive, POLST, or a discussion with a medical provider or your loved  ones about your wishes): No, advance care planning information given to patient to review.  Patient declined advance care planning discussion at this time.    Social History     Tobacco Use     Smoking status: Never Smoker     Smokeless tobacco: Never Used   Substance Use Topics     Alcohol use: Yes     Comment: socially, 4-5 drinks on the weekend     If you drink alcohol do you typically have >3 drinks per day or >7 drinks per week? No    Alcohol Use 11/15/2021   Prescreen: >3 drinks/day or >7 drinks/week? No   Prescreen: >3 drinks/day or >7 drinks/week? -       Last PSA: No results found for: PSA    Reviewed orders with patient. Reviewed health maintenance and updated orders accordingly - Yes  Labs reviewed in EPIC    Past Medical History:   Diagnosis Date     CARDIOVASCULAR SCREENING; LDL GOAL LESS THAN 160 10/31/2010    6.4% 10 yr risk 2021     Diverticulitis of sigmoid colon 2019    also diverticula found on colonscopy in 8/2019     Hepatic hemangioma      Plantar warts 10/04/2013     Premature ejaculation      RA (rheumatoid arthritis) (H) 12/31/2014     SKIN SENSATION DISTURB 12/25/2006    rt  hand, cts  rt wrist      Snoring 10/04/2013     Varicose veins of lower extremities with complications 12/25/2006     Problem list name updated by automated process. Provider to review       Past Surgical History:   Procedure Laterality Date     AS REPAIR  ANAL FISTULA,RECT ADV FLAP  2008    Dr. Blair Bishop     CARPAL TUNNEL RELEASE RT/LT  2019    both sides     COLONOSCOPY N/A 8/16/2019    [facility] - repeat in 10 years     varicose vein left side         MEDICATIONS:  Current Outpatient Medications   Medication     acetaminophen (TYLENOL) 500 MG tablet     ibuprofen (ADVIL/MOTRIN) 600 MG tablet     No current facility-administered medications for this visit.       SOCIAL HISTORY:  Social History     Tobacco Use     Smoking status: Never Smoker     Smokeless tobacco: Never Used   Substance Use Topics     Alcohol use:  "Yes     Comment: socially, 4-5 drinks on the weekend       Family History   Problem Relation Age of Onset     Thyroid Disease Mother         underactive      Hypertension Mother      Cerebrovascular Disease Father 67         of complications of stroke     Family History Negative Maternal Grandmother      Myocardial Infarction Maternal Grandfather      Family History Negative Paternal Grandmother      Family History Negative Paternal Grandfather      Family History Negative Sister         1     Family History Negative Brother         1- at age of 37-from cancer ?origin      Cancer - colorectal No family hx of      Prostate Cancer No family hx of          Review of Systems   Constitutional: Negative for chills and fever.   HENT: Negative for congestion, ear pain, hearing loss and sore throat.    Eyes: Negative for pain and visual disturbance.   Respiratory: Negative for cough and shortness of breath.    Cardiovascular: Negative for chest pain, palpitations and peripheral edema.   Gastrointestinal: Negative for abdominal pain, constipation, diarrhea, heartburn, hematochezia and nausea.   Genitourinary: Negative for dysuria, frequency, genital sores, hematuria, impotence, penile discharge and urgency.   Musculoskeletal: Positive for arthralgias. Negative for joint swelling and myalgias.   Skin: Negative for rash.   Neurological: Negative for dizziness, weakness, headaches and paresthesias.   Psychiatric/Behavioral: Negative for mood changes. The patient is not nervous/anxious.          OBJECTIVE:   /80 (BP Location: Right arm, Patient Position: Chair, Cuff Size: Adult Regular)   Pulse 74   Temp 97.8  F (36.6  C) (Oral)   Ht 1.676 m (5' 6\")   Wt 67.1 kg (148 lb)   SpO2 100%   BMI 23.89 kg/m      Physical Exam  GENERAL: healthy, alert and no distress  EYES: Eyes grossly normal to inspection, PERRL and conjunctivae and sclerae normal  HENT: ear canals and TM's normal, nose and mouth without ulcers or " "lesions  NECK: no adenopathy, no asymmetry, masses, or scars and thyroid normal to palpation  RESP: lungs clear to auscultation - no rales, rhonchi or wheezes  CV: regular rate and rhythm, normal S1 S2, no S3 or S4, no murmur, click or rub, no peripheral edema and peripheral pulses strong  ABDOMEN: soft, nontender, no hepatosplenomegaly, no masses and bowel sounds normal  MS: no gross musculoskeletal defects noted, no edema  SKIN: no suspicious lesions or rashes  NEURO: Normal strength and tone, mentation intact and speech normal  PSYCH: mentation appears normal, affect normal/bright  LYMPH: no cervical, supraclavicular, axillary, or inguinal adenopathy    Diagnostic Test Results:  Labs reviewed in Epic    ASSESSMENT/PLAN:   (Z00.00) Routine general medical examination at a health care facility  (primary encounter diagnosis)  Comment:   Plan: ZOSTER VACCINE RECOMBINANT ADJUVANTED IM NJX,         Cardiolipin Nuvia IgG and IgM            (M25.50) Multiple joint pain  Comment: rheum has future labs ordered  Plan: Cardiolipin Nuvia IgG and IgM, Urine Microscopic            (R76.8) Positive RENAE (antinuclear antibody)  Comment:   Plan: Cardiolipin Nuvia IgG and IgM, Urine Microscopic            (R76.8) Rheumatoid factor positive  Comment:   Plan: Cardiolipin Nuvia IgG and IgM              Patient has been advised of split billing requirements and indicates understanding: Yes  COUNSELING:   Reviewed preventive health counseling, as reflected in patient instructions  Special attention given to:        Immunizations    Vaccinated for: Zoster  And future flu and covid booster ordered            Colon cancer screening       Advance Care Planning    Estimated body mass index is 23.89 kg/m  as calculated from the following:    Height as of this encounter: 1.676 m (5' 6\").    Weight as of this encounter: 67.1 kg (148 lb).         He reports that he has never smoked. He has never used smokeless tobacco.      Counseling Resources:  ATP IV " Guidelines  Pooled Cohorts Equation Calculator  FRAX Risk Assessment  ICSI Preventive Guidelines  Dietary Guidelines for Americans, 2010  [employer] MyPlate  ASA Prophylaxis  Lung CA Screening    Lucinda Badillo MD  [facility]

## 2021-11-15 NOTE — PATIENT INSTRUCTIONS
Preventive Health Recommendations  Male Ages 50 - 64    Yearly exam:             See your health care provider every year in order to  o   Review health changes.   o   Discuss preventive care.    o   Review your medicines if your doctor has prescribed any.     Have a cholesterol test every 5 years, or more frequently if you are at risk for high cholesterol/heart disease.     Have a diabetes test (fasting glucose) every three years. If you are at risk for diabetes, you should have this test more often.     Have a colonoscopy at age 50, or have a yearly FIT test (stool test). These exams will check for colon cancer.      Talk with your health care provider about whether or not a prostate cancer screening test (PSA) is right for you.    You should be tested each year for STDs (sexually transmitted diseases), if you re at risk.     Shots: Get a flu shot each year. Get a tetanus shot every 10 years.     Nutrition:    Eat at least 5 servings of fruits and vegetables daily.     Eat whole-grain bread, whole-wheat pasta and brown rice instead of white grains and rice.     Get adequate Calcium and Vitamin D.     Lifestyle    Exercise for at least 150 minutes a week (30 minutes a day, 5 days a week). This will help you control your weight and prevent disease.     Limit alcohol to one drink per day.     No smoking.     Wear sunscreen to prevent skin cancer.     See your dentist every six months for an exam and cleaning.     See your eye doctor every 1 to 2 years.    Patient Education     Understanding Coronary Calcium Scan   A coronary calcium scan is a test that looks at the arteries that supply blood to the heart muscle. These are called the coronary arteries. The scan checks for calcified plaque buildup along the walls of these arteries. Plaque can be made up of calcium, fat, cholesterol, and other substances. A buildup of plaque narrows the arteries. This affects the flow of blood to the heart muscle. If a coronary artery  becomes completely blocked, a heart attack (death of part of the heart muscle) can occur. Knowing how much plaque you have in your arteries can help figure out your risk for a heart attack.   Why do I need a coronary calcium scan?   A coronary calcium scan measures the amount of calcium in the arteries. The presence of calcium deposits in an artery means that plaque is starting to build up.  The results of the test are given as a calcium score. The scan can help predict your risk of having a heart attack, even before symptoms appear.   This scan isn t for everyone. It's most useful when considered along with other risk factors for a heart attack. These include family history of heart disease, high blood pressure, and unhealthy cholesterol.   What happens during a coronary calcium scan?   The scan is done using computed tomography (CT). This test uses X-rays and computers to create detailed images of the heart.     For the test, you lie on a platform that slides into a tube. During the scan:     You will need to stay very still.    You may be asked to hold your breath for short periods of time.    You may have small sticky discs attached to wires (electrodes) on your chest to record your heartbeat.  The test will likely take about 10 minutes. Once the test is done and your appointment is finished, you can go back to your normal routine.   What are the risks of coronary calcium scan?   A CT scan exposes you to a certain amount of radiation. The benefits of the scan likely outweigh the risks for you. You and your healthcare provider can discuss this further.   [employer] last reviewed this educational content on 9/1/2019 2000-2021 [employer]. All rights reserved. This information is not intended as a substitute for professional medical care. Always follow your healthcare professional's instructions.

## 2021-11-16 LAB
ALBUMIN SERPL-MCNC: 3.8 G/DL (ref 3.4–5)
C3 SERPL-MCNC: 142 MG/DL (ref 81–157)
C4 SERPL-MCNC: 35 MG/DL (ref 13–39)
CALCIUM SERPL-MCNC: 9 MG/DL (ref 8.5–10.1)
CREAT UR-MCNC: 22 MG/DL
CRP SERPL-MCNC: 11 MG/L (ref 0–8)
DEPRECATED CALCIDIOL+CALCIFEROL SERPL-MC: 46 UG/L (ref 20–75)
MICROALBUMIN UR-MCNC: <5 MG/L
MICROALBUMIN/CREAT UR: NORMAL MG/G{CREAT}
PTH-INTACT SERPL-MCNC: 26 PG/ML (ref 18–80)
URATE SERPL-MCNC: 3.7 MG/DL (ref 3.5–7.2)

## 2021-11-17 ENCOUNTER — THERAPY VISIT (OUTPATIENT)
Dept: PHYSICAL THERAPY | Facility: CLINIC | Age: 56
End: 2021-11-17
Payer: COMMERCIAL

## 2021-11-17 DIAGNOSIS — M25.511 ACUTE PAIN OF RIGHT SHOULDER: ICD-10-CM

## 2021-11-17 LAB
ACE SERPL-CCNC: 67 U/L
CCP AB SER IA-ACNC: >340 U/ML
DRVVT SCREEN RATIO: 1
DSDNA AB SER-ACNC: 0.8 IU/ML
INR PPP: 1.08 (ref 0.85–1.15)
LA PPP-IMP: NEGATIVE
LUPUS INTERPRETATION: NORMAL
PTT RATIO: 1
THROMBIN TIME: 17.1 SECONDS (ref 13–19)

## 2021-11-17 PROCEDURE — 97010 HOT OR COLD PACKS THERAPY: CPT | Mod: GP | Performed by: PHYSICAL THERAPIST

## 2021-11-17 PROCEDURE — 97110 THERAPEUTIC EXERCISES: CPT | Mod: GP | Performed by: PHYSICAL THERAPIST

## 2021-11-17 NOTE — PROGRESS NOTES
Subjective:  HPI  Physical Exam                    Objective:  System    Physical Exam    General     ROS    Assessment/Plan:    DISCHARGE REPORT    Progress reporting period is from 10/23/21 to 11/17/21 (3 visits).       SUBJECTIVE  Subjective changes noted by patient:  .  Subjective: Pt reports that he has not had pain in the right shoulder the last 3-4 days.      Current pain level is  Current Pain level: 0/10.     Previous pain level was   Initial Pain level: 8/10.   Changes in function:  Yes (See Goal flowsheet attached for changes in current functional level)  Adverse reaction to treatment or activity: None    OBJECTIVE  Changes noted in objective findings:    Objective: Full painfree A/PROM.  MMT is strong and painfree throughout.       ASSESSMENT/PLAN  Updated problem list and treatment plan: Diagnosis 1:  Right shoulder RTC tendonitis    STG/LTGs have been met or progress has been made towards goals:  Yes (See Goal flow sheet completed today.)  Assessment of Progress: The patient's condition is improving.  Self Management Plans:  Patient has been instructed in a home treatment program.    Michael continues to require the following intervention to meet STG and LTG's:  PT intervention is no longer required to meet STG/LTG.    Recommendations:  This patient is ready to be discharged from therapy and continue their home treatment program.    Please refer to the daily flowsheet for treatment today, total treatment time and time spent performing 1:1 timed codes.

## 2021-11-18 LAB
CARDIOLIPIN IGG SER IA-ACNC: 3.8 GPL-U/ML
CARDIOLIPIN IGG SER IA-ACNC: NEGATIVE
CARDIOLIPIN IGM SER IA-ACNC: <2 MPL-U/ML
CARDIOLIPIN IGM SER IA-ACNC: NEGATIVE
ENA SM IGG SER IA-ACNC: <1.6 U/ML
ENA SM IGG SER IA-ACNC: NEGATIVE
ENA SS-A AB SER IA-ACNC: <0.5 U/ML
ENA SS-A AB SER IA-ACNC: NEGATIVE
ENA SS-B IGG SER IA-ACNC: <0.6 U/ML
ENA SS-B IGG SER IA-ACNC: NEGATIVE
U1 SNRNP IGG SER IA-ACNC: 1.2 U/ML
U1 SNRNP IGG SER IA-ACNC: NEGATIVE

## 2021-11-19 LAB
CARDIOLIPIN IGA SER IA-ACNC: 6.9 APL-U/ML
CARDIOLIPIN IGA SER IA-ACNC: NEGATIVE

## 2021-11-23 ENCOUNTER — TELEPHONE (OUTPATIENT)
Dept: RHEUMATOLOGY | Facility: CLINIC | Age: 56
End: 2021-11-23
Payer: COMMERCIAL

## 2021-11-23 NOTE — TELEPHONE ENCOUNTER
----- Message from Kingslye Schwarz DO sent at 11/22/2021  9:17 PM CST -----  Noted to have an elevated CCP antibody, this is lab marker that sometimes can be seen with patients who have a diagnosis of rheumatoid arthritis. This lab test result needs to be correlated clinically.    Noted to have elevated C-reactive protein (nonspecific inflammatory marker), not new, has had elevated level in the past.  This result needs to be correlated clinically as well.  Other similar marker called ESR, was within normal limits.    Remainder of lab results were unremarkable.    Recommend patient schedule a follow-up reassessment, preferably in clinic, so we can optimize correlating results clinically.

## 2021-12-03 ENCOUNTER — ALLIED HEALTH/NURSE VISIT (OUTPATIENT)
Dept: FAMILY MEDICINE | Facility: CLINIC | Age: 56
End: 2021-12-03
Payer: COMMERCIAL

## 2021-12-03 DIAGNOSIS — Z23 NEED FOR COVID-19 VACCINE: Primary | ICD-10-CM

## 2021-12-03 PROCEDURE — 0004A COVID-19,PF,PFIZER (12+ YRS): CPT

## 2021-12-03 PROCEDURE — 99207 PR NO CHARGE NURSE ONLY: CPT

## 2021-12-03 PROCEDURE — 91300 COVID-19,PF,PFIZER (12+ YRS): CPT

## 2022-04-30 ENCOUNTER — OFFICE VISIT (OUTPATIENT)
Dept: URGENT CARE | Facility: URGENT CARE | Age: 57
End: 2022-04-30
Payer: COMMERCIAL

## 2022-04-30 VITALS
OXYGEN SATURATION: 96 % | SYSTOLIC BLOOD PRESSURE: 122 MMHG | TEMPERATURE: 100.1 F | BODY MASS INDEX: 23.57 KG/M2 | DIASTOLIC BLOOD PRESSURE: 80 MMHG | HEART RATE: 89 BPM | WEIGHT: 146 LBS | RESPIRATION RATE: 14 BRPM

## 2022-04-30 DIAGNOSIS — R68.89 FLU-LIKE SYMPTOMS: Primary | ICD-10-CM

## 2022-04-30 LAB
FLUAV AG SPEC QL IA: NEGATIVE
FLUBV AG SPEC QL IA: NEGATIVE

## 2022-04-30 PROCEDURE — U0003 INFECTIOUS AGENT DETECTION BY NUCLEIC ACID (DNA OR RNA); SEVERE ACUTE RESPIRATORY SYNDROME CORONAVIRUS 2 (SARS-COV-2) (CORONAVIRUS DISEASE [COVID-19]), AMPLIFIED PROBE TECHNIQUE, MAKING USE OF HIGH THROUGHPUT TECHNOLOGIES AS DESCRIBED BY CMS-2020-01-R: HCPCS | Performed by: FAMILY MEDICINE

## 2022-04-30 PROCEDURE — 87804 INFLUENZA ASSAY W/OPTIC: CPT | Performed by: FAMILY MEDICINE

## 2022-04-30 PROCEDURE — 99213 OFFICE O/P EST LOW 20 MIN: CPT | Performed by: FAMILY MEDICINE

## 2022-04-30 PROCEDURE — U0005 INFEC AGEN DETEC AMPLI PROBE: HCPCS | Performed by: FAMILY MEDICINE

## 2022-04-30 NOTE — LETTER
[facility]  [id number]  [facility] [id number] [phone]      April 30, 2022    RE:  Michael Mills                                                                                                                                                       [address]   [facility] [id number]            To whom it may concern:    Michael Mills is under my professional care for Flu-like symptoms.   May return to work /   with no restrictions when shortness of breath, severe cough, fevers and chills are resolved.  Influenza is usually infectious for 7-10 days.  Covid testing is pending.          Sincerely,        Lucie Santana MD    [facility]

## 2022-04-30 NOTE — PROGRESS NOTES
ASSESSMENT:  Flu-like symptoms     - Influenza A & B Antigen - Clinic Collect  - Symptomatic; Yes; 4/28/2022 COVID-19 Virus (Coronavirus) by PCR Nose       We discussed the limitations of influenza testing and limitations of  antiviral therapy against influenza, that treatment should usually be initiated within 2 days of the start of symptoms and is most critical for persons with weak immunity and chronic respiratory illnesses     Symptomatic therapy suggested:  Rest, drink lots of fluids,  Acetaminophen / ibuprofen for body aches and fever,  Salt water gargles for sore throat,  OTC anesthetic lozenges for sore throat,  OTC cough medications.   Call or return to clinic prn if these symptoms worsen or fail to improve as anticipated.      Testing for Covid 19 has been ordered.  If the covid test is positive will need to quarantine at least 5 days and may return to work/ school if symptom free and fever free at least 1 day.  If the covid test is negative may return to work/ school if symptom free    Note for work   ----------------------------------------------------------------------------------------------------------------------------------     SUBJECTIVE:   Chief Complaint   Patient presents with     URI     Sx began Thursday, fever body aching, headache, cough and stuffy nose      Michael Mills is a 57 year old male who present complaining of flu-like symptoms: fevers, chills, myalgias, headache,  congestion, sore throat and cough for 3 days. Denies /  Reports some  dyspnea /  no wheezing.  Has no known exposure to people with influenza or covid 19      Past Medical History:   Diagnosis Date     CARDIOVASCULAR SCREENING; LDL GOAL LESS THAN 160 10/31/2010    6.4% 10 yr risk 2021     Diverticulitis of sigmoid colon 2019    also diverticula found on colonscopy in 8/2019     Hepatic hemangioma      Plantar warts 10/04/2013     Premature ejaculation      RA (rheumatoid arthritis) (H) 12/31/2014     SKIN  SENSATION DISTURB 2006    rt  hand, cts  rt wrist      Snoring 10/04/2013     Varicose veins of lower extremities with complications 2006     Problem list name updated by automated process. Provider to review     Patient Active Problem List   Diagnosis     Varicose veins of lower extremities with complications     Premature ejaculation     CARDIOVASCULAR SCREENING; LDL GOAL LESS THAN 160     Snoring     Pain in limb     Plantar warts     RA (rheumatoid arthritis) (H)     Diverticulosis     History of nephrolithiasis     Varicose veins of other specified sites       ALLERGIES:  No known drug allergies    MEDs  acetaminophen (TYLENOL) 500 MG tablet, Take 2 tablets (1,000 mg) by mouth every 12 hours as needed for mild pain  ibuprofen (ADVIL/MOTRIN) 600 MG tablet, Take 1 tablet (600 mg) by mouth every 8 hours as needed for moderate pain    No current facility-administered medications on file prior to visit.      Social History     Tobacco Use     Smoking status: Never Smoker     Smokeless tobacco: Never Used   Substance Use Topics     Alcohol use: Yes     Comment: socially, 4-5 drinks on the weekend       Family History   Problem Relation Age of Onset     Thyroid Disease Mother         underactive      Hypertension Mother      Cerebrovascular Disease Father 67         of complications of stroke     Family History Negative Maternal Grandmother      Myocardial Infarction Maternal Grandfather      Family History Negative Paternal Grandmother      Family History Negative Paternal Grandfather      Family History Negative Sister         1     Family History Negative Brother         1- at age of 37-from cancer ?origin      Cancer - colorectal No family hx of      Prostate Cancer No family hx of          ROS:  CONSTITUTIONAL:  fever, chills,    INTEGUMENTARY/SKIN: NEGATIVE for worrisome rashes,  or lesions  EYES: NEGATIVE for vision changes or irritation  ENT/MOUTH: NEGATIVE for ear, mouth and throat  problems  GI: NEGATIVE for nausea, abdominal pain,   or change in bowel habits     OBJECTIVE  :/80 (BP Location: Right arm)   Pulse 89   Temp 100.1  F (37.8  C) (Tympanic)   Resp 14   Wt 66.2 kg (146 lb)   SpO2 96%   BMI 23.57 kg/m    GENERAL APPEARANCE: alert, moderate distress, flushed and fatigued,  frequent cough  EYES: EOMI,  PERRL, conjunctiva clear  HENT: ear canals and TM's normal.  Nose and mouth without ulcers, erythema or lesions  NECK: supple, nontender, no lymphadenopathy  RESP: lungs clear to auscultation - no rales, rhonchi or wheezes  CV: regular rates and rhythm, normal S1 S2, no murmur noted  NEURO: Normal strength and tone, sensory exam grossly normal,  normal speech and mentation  SKIN: no suspicious lesions or rashes    Results for orders placed or performed in visit on 04/30/22   Influenza A & B Antigen - Clinic Collect     Status: Normal    Specimen: Nose; Swab   Result Value Ref Range    Influenza A antigen Negative Negative    Influenza B antigen Negative Negative    Narrative    Test results must be correlated with clinical data. If necessary, results should be confirmed by a molecular assay or viral culture.

## 2022-05-01 LAB — SARS-COV-2 RNA RESP QL NAA+PROBE: POSITIVE

## 2022-05-02 ENCOUNTER — NURSE TRIAGE (OUTPATIENT)
Dept: NURSING | Facility: CLINIC | Age: 57
End: 2022-05-02
Payer: COMMERCIAL

## 2022-05-02 NOTE — TELEPHONE ENCOUNTER
Provider Response to 2nd Level Triage Request    I have reviewed the RN documentation. My recommendation is:  Virtual Visit need to be seen via Virtual visit, otherwise with primary provider to initiate medication.      COVID treatment can not be done via request.  Please set patient up with Virtual visit or visit with primary provider

## 2022-05-02 NOTE — TELEPHONE ENCOUNTER
covid pos since Saturday, got results last night.    Was seen [facility] sat 4/30 and if positive wanted covid antiviral to [facility]. Now that he is covid positive her would like medication called in.      Marilee Florez RN  [facility] Nurse Advisor

## 2022-05-03 NOTE — TELEPHONE ENCOUNTER
Spoke with patient-   He has no chronic illnesses- and is now day 6 of his covid illness -  Oral covid medication is not recommended.     Lucie Santana MD

## 2022-06-10 ENCOUNTER — OFFICE VISIT (OUTPATIENT)
Dept: URGENT CARE | Facility: URGENT CARE | Age: 57
End: 2022-06-10
Payer: COMMERCIAL

## 2022-06-10 VITALS
SYSTOLIC BLOOD PRESSURE: 120 MMHG | DIASTOLIC BLOOD PRESSURE: 70 MMHG | OXYGEN SATURATION: 98 % | RESPIRATION RATE: 16 BRPM | WEIGHT: 147 LBS | BODY MASS INDEX: 23.73 KG/M2 | TEMPERATURE: 98.1 F | HEART RATE: 77 BPM

## 2022-06-10 DIAGNOSIS — M06.9 FLARE OF RHEUMATOID ARTHRITIS (H): Primary | ICD-10-CM

## 2022-06-10 LAB
BASOPHILS # BLD AUTO: 0 10E3/UL (ref 0–0.2)
BASOPHILS NFR BLD AUTO: 0 %
CRP SERPL-MCNC: 8.7 MG/L (ref 0–8)
EOSINOPHIL # BLD AUTO: 0.2 10E3/UL (ref 0–0.7)
EOSINOPHIL NFR BLD AUTO: 2 %
ERYTHROCYTE [DISTWIDTH] IN BLOOD BY AUTOMATED COUNT: 12.9 % (ref 10–15)
ERYTHROCYTE [SEDIMENTATION RATE] IN BLOOD BY WESTERGREN METHOD: 12 MM/HR (ref 0–20)
HCT VFR BLD AUTO: 48.3 % (ref 40–53)
HGB BLD-MCNC: 17.1 G/DL (ref 13.3–17.7)
LYMPHOCYTES # BLD AUTO: 1.8 10E3/UL (ref 0.8–5.3)
LYMPHOCYTES NFR BLD AUTO: 27 %
MCH RBC QN AUTO: 31.3 PG (ref 26.5–33)
MCHC RBC AUTO-ENTMCNC: 35.4 G/DL (ref 31.5–36.5)
MCV RBC AUTO: 88 FL (ref 78–100)
MONOCYTES # BLD AUTO: 0.7 10E3/UL (ref 0–1.3)
MONOCYTES NFR BLD AUTO: 10 %
NEUTROPHILS # BLD AUTO: 4 10E3/UL (ref 1.6–8.3)
NEUTROPHILS NFR BLD AUTO: 60 %
PLATELET # BLD AUTO: 195 10E3/UL (ref 150–450)
RBC # BLD AUTO: 5.47 10E6/UL (ref 4.4–5.9)
WBC # BLD AUTO: 6.6 10E3/UL (ref 4–11)

## 2022-06-10 PROCEDURE — 86431 RHEUMATOID FACTOR QUANT: CPT | Performed by: FAMILY MEDICINE

## 2022-06-10 PROCEDURE — 86140 C-REACTIVE PROTEIN: CPT | Performed by: FAMILY MEDICINE

## 2022-06-10 PROCEDURE — 85025 COMPLETE CBC W/AUTO DIFF WBC: CPT | Performed by: FAMILY MEDICINE

## 2022-06-10 PROCEDURE — 86038 ANTINUCLEAR ANTIBODIES: CPT | Performed by: FAMILY MEDICINE

## 2022-06-10 PROCEDURE — 85652 RBC SED RATE AUTOMATED: CPT | Performed by: FAMILY MEDICINE

## 2022-06-10 PROCEDURE — 99213 OFFICE O/P EST LOW 20 MIN: CPT | Performed by: FAMILY MEDICINE

## 2022-06-10 PROCEDURE — 80053 COMPREHEN METABOLIC PANEL: CPT | Performed by: FAMILY MEDICINE

## 2022-06-10 PROCEDURE — 36415 COLL VENOUS BLD VENIPUNCTURE: CPT | Performed by: FAMILY MEDICINE

## 2022-06-10 RX ORDER — PREDNISONE 5 MG/1
TABLET ORAL
Qty: 42 TABLET | Refills: 0 | Status: SHIPPED | OUTPATIENT
Start: 2022-06-10 | End: 2022-11-15

## 2022-06-11 LAB
ALBUMIN SERPL-MCNC: 3.9 G/DL (ref 3.4–5)
ALP SERPL-CCNC: 65 U/L (ref 40–150)
ALT SERPL W P-5'-P-CCNC: 16 U/L (ref 0–70)
ANION GAP SERPL CALCULATED.3IONS-SCNC: 4 MMOL/L (ref 3–14)
AST SERPL W P-5'-P-CCNC: 18 U/L (ref 0–45)
BILIRUB SERPL-MCNC: 1.2 MG/DL (ref 0.2–1.3)
BUN SERPL-MCNC: 14 MG/DL (ref 7–30)
CALCIUM SERPL-MCNC: 8.8 MG/DL (ref 8.5–10.1)
CHLORIDE BLD-SCNC: 106 MMOL/L (ref 94–109)
CO2 SERPL-SCNC: 27 MMOL/L (ref 20–32)
CREAT SERPL-MCNC: 0.88 MG/DL (ref 0.66–1.25)
GFR SERPL CREATININE-BSD FRML MDRD: >90 ML/MIN/1.73M2
GLUCOSE BLD-MCNC: 86 MG/DL (ref 70–99)
POTASSIUM BLD-SCNC: 4.3 MMOL/L (ref 3.4–5.3)
PROT SERPL-MCNC: 7.9 G/DL (ref 6.8–8.8)
SODIUM SERPL-SCNC: 137 MMOL/L (ref 133–144)

## 2022-06-12 NOTE — PROGRESS NOTES
"SUBJECTIVE:  Michael Mills, a 57 year old male scheduled an appointment to discuss the following issues:  Flare of rheumatoid arthritis (H)    Medical, social, surgical, and family histories reviewed.     Knee Pain   Hand Pain (swelling)  Pt has been diagnosed rheumatoid arthritis 5 years ago.  He also has hx of carpal tunnel.  He is complaining of 4 to 5 months duration of bilateral hands and knees being \"hot\" with pain.  He has significant morning stiffness.  Pt uses Ibuprofen 600mg per day (last had at 9:30am today) and Tylenol ---not helping.  No injuries or trauma or falls.  In the past high dose Prednisone has caused some facial swelling and retention of fluid.      ROS:  See HPI.  No nausea/vomiting.  No fever/chills.  No chest pain/SOB.  No BM/urine problems.  No dizziness or syncope.      OBJECTIVE:  /70 (BP Location: Right arm, Patient Position: Chair, Cuff Size: Adult Regular)   Pulse 77   Temp 98.1  F (36.7  C) (Oral)   Resp 16   Wt 66.7 kg (147 lb)   SpO2 98%   BMI 23.73 kg/m    EXAM:  GENERAL APPEARANCE: alert and mild distress. afebrile  HENT: ear canals and TM's normal and nose and mouth without ulcers or lesions  NECK: no adenopathy, no asymmetry, masses, or scars and thyroid normal to palpation  RESP: lungs clear to auscultation - no rales, rhonchi or wheezes  CV: regular rates and rhythm, normal S1 S2, no S3 or S4 and no murmur, click or rub  LYMPHATICS: no cervical adenopathy  ABDOMEN: soft, nontender, without hepatosplenomegaly or masses and bowel sounds normal  MS: somewhat warm and mildly swollen bilateral hands at 2nd and 3rd MCP joints with mild lateral deviation; no redness or induration; no knee effusion or redness or tenderness; knees and hands ROM WNL  SKIN: no suspicious lesions or rashes  NEURO: Normal strength and tone, mentation intact and speech normal      ASSESSMENT/PLAN:  (M06.9) Flare of rheumatoid arthritis (H)  (primary encounter diagnosis)  Comment: CBC+diff, " CMP & ESR normal; CRP and RF, RENAE pending  Plan: predniSONE (DELTASONE) 5 MG tablet (low dose, tapering), Adult         Rheumatology  Referral, CBC with         platelets and differential, Comprehensive         metabolic panel (BMP + Alb, Alk Phos, ALT, AST,        Total. Bili, TP), ESR: Erythrocyte         sedimentation rate, Rheumatoid factor, CRP,         inflammation, Anti Nuclear Nuvia IgG by IFA with         Reflex    Pt to f/up PCP within 2 weeks, sooner if no improvement or worsening.  Warning signs and symptoms explained.

## 2022-06-13 LAB
ANA SER QL IF: NEGATIVE
RHEUMATOID FACT SER NEPH-ACNC: 69 IU/ML

## 2022-07-25 ENCOUNTER — TRANSFERRED RECORDS (OUTPATIENT)
Dept: HEALTH INFORMATION MANAGEMENT | Facility: CLINIC | Age: 57
End: 2022-07-25

## 2022-07-25 ENCOUNTER — TRANSFERRED RECORDS (OUTPATIENT)
Dept: URGENT CARE | Facility: URGENT CARE | Age: 57
End: 2022-07-25

## 2022-07-25 LAB — HEP C HIM: NORMAL

## 2022-09-01 ENCOUNTER — OFFICE VISIT (OUTPATIENT)
Dept: URGENT CARE | Facility: URGENT CARE | Age: 57
End: 2022-09-01
Payer: COMMERCIAL

## 2022-09-01 VITALS
DIASTOLIC BLOOD PRESSURE: 85 MMHG | TEMPERATURE: 97.8 F | OXYGEN SATURATION: 98 % | SYSTOLIC BLOOD PRESSURE: 124 MMHG | HEART RATE: 78 BPM

## 2022-09-01 DIAGNOSIS — M54.50 ACUTE BILATERAL LOW BACK PAIN WITHOUT SCIATICA: Primary | ICD-10-CM

## 2022-09-01 PROCEDURE — 99213 OFFICE O/P EST LOW 20 MIN: CPT | Performed by: PHYSICIAN ASSISTANT

## 2022-09-01 RX ORDER — CYCLOBENZAPRINE HCL 10 MG
10 TABLET ORAL
Qty: 20 TABLET | Refills: 0 | Status: SHIPPED | OUTPATIENT
Start: 2022-09-01 | End: 2022-11-15

## 2022-09-01 RX ORDER — METHYLPREDNISOLONE 4 MG
TABLET, DOSE PACK ORAL
Qty: 21 TABLET | Refills: 0 | Status: SHIPPED | OUTPATIENT
Start: 2022-09-01 | End: 2022-11-15

## 2022-09-01 NOTE — LETTER
September 1, 2022      Michael Mills   [address] [place] [id number]        To Whom It May Concern:    Michael Mills  was seen on 9/1/2022  Please excuse his absence from work tomorrow 9/2/2022 due to acute medical illness.    Dx: Acute bilateral low back pain without sciatica    Sincerely,        Radha Duke PA-C

## 2022-09-01 NOTE — PROGRESS NOTES
Assessment & Plan     Acute bilateral low back pain without sciatica  Acute problem.  Flexeril is prescribed.  Medrol Dosepak also prescribed.  PT referral.  Avoidance of heavy lifting.  Recommended resting.  Alternating ice and heat to the affected area.  Follow-up if any worsening symptoms.  Patient agrees with the plan.  - methylPREDNISolone (MEDROL DOSEPAK) 4 MG tablet therapy pack  Dispense: 21 tablet; Refill: 0  - cyclobenzaprine (FLEXERIL) 10 MG tablet  Dispense: 20 tablet; Refill: 0  - Physical Therapy Referral       Return in about 1 week (around 9/8/2022) for Symptoms failing to improve.    Radha Duke PA-C  [facility] ANTWAN Rodriguez is a 57 year old male who presents to clinic today for the following health issues:  Chief Complaint   Patient presents with     Urgent Care     Lower back pain/injury while lifting something which happened a week ago.     HPI      Back Pain    Onset of symptoms was 1 week(s) ago.  Location: bilateral low back  Radiation: does not radiate  Context:       The injury happened while at home, lifted some heavy tires      Mechanism: recent heavy lifting      Patient experienced immediate pain  Course of symptoms is worsening.    Severity moderate  Current and Associated symptoms: pain  Denies: fecal incontinence, urinary incontinence, lower extremity numbness, lower extremity weakness and paresthesia    Aggravating Factors: sitting, bending and changing position  Therapies to improve symptoms include: ibuprofen and Tylenol  Past history: previous degenerative joint disease of the lumbar spine  Hx of rheumatoid arthritis    Review of Systems  Constitutional, HEENT, cardiovascular, pulmonary, GI, , musculoskeletal, neuro, skin, endocrine and psych systems are negative, except as otherwise noted.      Objective    /85 (BP Location: Right arm, Patient Position: Sitting, Cuff Size: Adult Regular)   Pulse 78   Temp 97.8  F (36.6  C)  (Tympanic)   SpO2 98%   Physical Exam   GENERAL: healthy, alert and no distress  MS: Tenderness to palpation along the lumbar spine and lumbar muscles.  Gait is antalgic, sensation and strength lower extremities is normal.

## 2022-09-30 ENCOUNTER — TRANSFERRED RECORDS (OUTPATIENT)
Dept: HEALTH INFORMATION MANAGEMENT | Facility: CLINIC | Age: 57
End: 2022-09-30

## 2022-09-30 LAB
ALT SERPL-CCNC: 15 IU/L (ref 5–40)
AST SERPL-CCNC: 20 U/L (ref 5–34)
CREATININE (EXTERNAL): 0.85 MG/DL (ref 0.5–1.3)

## 2022-10-22 ENCOUNTER — HEALTH MAINTENANCE LETTER (OUTPATIENT)
Age: 57
End: 2022-10-22

## 2022-11-13 ENCOUNTER — APPOINTMENT (OUTPATIENT)
Dept: GENERAL RADIOLOGY | Facility: CLINIC | Age: 57
End: 2022-11-13
Attending: EMERGENCY MEDICINE
Payer: COMMERCIAL

## 2022-11-13 ENCOUNTER — HOSPITAL ENCOUNTER (EMERGENCY)
Facility: CLINIC | Age: 57
Discharge: HOME | End: 2022-11-13
Attending: EMERGENCY MEDICINE | Admitting: EMERGENCY MEDICINE
Payer: COMMERCIAL

## 2022-11-13 VITALS
HEART RATE: 82 BPM | TEMPERATURE: 96.9 F | DIASTOLIC BLOOD PRESSURE: 84 MMHG | OXYGEN SATURATION: 99 % | RESPIRATION RATE: 18 BRPM | SYSTOLIC BLOOD PRESSURE: 124 MMHG

## 2022-11-13 DIAGNOSIS — F10.929 ALCOHOLIC INTOXICATION WITH COMPLICATION (H): ICD-10-CM

## 2022-11-13 DIAGNOSIS — S61.512A LACERATION OF LEFT WRIST, INITIAL ENCOUNTER: ICD-10-CM

## 2022-11-13 DIAGNOSIS — T07.XXXA MULTIPLE LACERATIONS: ICD-10-CM

## 2022-11-13 PROCEDURE — 73130 X-RAY EXAM OF HAND: CPT | Mod: 50

## 2022-11-13 PROCEDURE — 12002 RPR S/N/AX/GEN/TRNK2.6-7.5CM: CPT

## 2022-11-13 PROCEDURE — 99284 EMERGENCY DEPT VISIT MOD MDM: CPT

## 2022-11-13 PROCEDURE — 73090 X-RAY EXAM OF FOREARM: CPT | Mod: 50

## 2022-11-13 RX ORDER — CEPHALEXIN 500 MG/1
500 CAPSULE ORAL 4 TIMES DAILY
Qty: 28 CAPSULE | Refills: 0 | Status: SHIPPED | OUTPATIENT
Start: 2022-11-13 | End: 2022-11-20

## 2022-11-13 RX ORDER — LIDOCAINE HYDROCHLORIDE AND EPINEPHRINE 10; 10 MG/ML; UG/ML
INJECTION, SOLUTION INFILTRATION; PERINEURAL
Status: DISCONTINUED
Start: 2022-11-13 | End: 2022-11-14 | Stop reason: HOSPADM

## 2022-11-13 ASSESSMENT — ACTIVITIES OF DAILY LIVING (ADL)
ADLS_ACUITY_SCORE: 35
ADLS_ACUITY_SCORE: 35

## 2022-11-13 ASSESSMENT — ENCOUNTER SYMPTOMS: WOUND: 1

## 2022-11-13 NOTE — LETTER
November 13, 2022      To Whom It May Concern:      Michael Mills was seen in our Emergency Department today, 11/13/22.  I expect his condition to improve over the next 2-3 days.  He may return to work when he is able to safely perform his job tasks while wearing gloves. Further work release can be explained through the hand trauma clinic he was referred to.    Sincerely,            Momo Amezquita, DO

## 2022-11-14 NOTE — DISCHARGE INSTRUCTIONS
What do you do next:   Continue your home medications unless we have specifically changed them  Because you are working on a vehicle and your wound was potentially contaminated, I will elect to prescribe antibiotics.  Keep your wound clean and dry.  You may use bacitracin to cover the wound and a bandage over that.  Do not soak the wound.  You may wash your hands in water may run over the wound.  You may gently use soap and water to cleanse the wound.  Showering is okay.  After your sutures have been removed, apply sunscreen over the injury site to decrease the appearance of a scar  I would suggest you take 600 mg of ibuprofen every 6-8 hours as needed for fever or pain.  Take this with food or milk to avoid an upset stomach.  Decrease your alcohol consumption.  Follow up as indicated below    When do you return: If you have uncontrollable pain, persistent bloody or foul-smelling drainage from your wounds, rapidly spreading redness around the wound, sensory changes or functional/strength changes of your hand (especially left hand), or any other symptoms that concern you, please return to the ED for reevaluation.    Thank you for allowing us to care for you today.

## 2022-11-14 NOTE — ED TRIAGE NOTES
Pt arrives to ED with lacs to L wrist and R thumb. Pt was drinking, smashed a beer bottle and cut himself. Pt was checked out by paramedics and wrapped up. Per EMS, pt needs stitches. Pt is currently intoxicated. Stated he hurt himself out of frsutration. Denies daily ETOH and SI.

## 2022-11-14 NOTE — ED PROVIDER NOTES
History   Chief Complaint:  Self Harm - Deliberate       HPI   Michael Mills is a 57 year old left-handed male who presents with deliberate self-harm. The patient reports that he was doing work on his truck in the garage while drinking beer around 2000 tonight. He got frustrated and smashed a beer bottle against his left wrist. The bottle broke and cut himself with the glass in several places on both forearms. He says he did this out of frustration and denies suicidal ideation. No personal history of cardiac surgeries or sickle cell disease. Last Tdap in 2019 per [facility].    Review of Systems   Skin: Positive for wound.   Psychiatric/Behavioral: Positive for self-injury. Negative for suicidal ideas.   All other systems reviewed and are negative.    Allergies:  No known drug allergies    Medications:  Cyclobenzaprine  Methylprednisolone  Prednisone    Past Medical History:     Nephrolithiasis  Varicose veins of lower extremities  Rheumatoid arthritis  Diverticulosis  Gastroesophageal reflux disease     Past Surgical History:    Anal fistula repair  Carpal tunnel release bilateral      Family History:    Mother- thyroid disease, hypertension  Father- CVA    Social History:  The patient presents to the ED with son  PCP: Neymar Wesley     Physical Exam     Patient Vitals for the past 24 hrs:   BP Temp Temp src Pulse Resp SpO2   11/13/22 2100 (!) 123/93 -- -- -- -- --   11/13/22 2059 -- 96.9  F (36.1  C) Temporal 73 20 98 %       Physical Exam  Constitutional: Vital signs reviewed as above.   Head: No external signs of trauma noted.  Eyes: Pupils are equal, round, and reactive to light.   Neck: No JVD noted  Cardiovascular: normal rate, Regular rhythm and normal heart sounds.  No murmur heard. Equal B/L peripheral pulses.  Pulmonary/Chest: Effort normal and breath sounds normal. No respiratory distress. Patient has no wheezes. Patient has no rales.   Gastrointestinal: Soft. There is no tenderness.    Musculoskeletal/Extremities: No pitting edema noted. Normal tone.  Neurological: Patient is alert and oriented to person, place, and time.   Skin: He has two lacerations on his right forearm (distal is deeper, proximal is superficial), one superficial laceration to the thenar eminence of the right thumb, one to the radial aspect of the fourth right finger mid-phalanx, two very superficial lacerations on the left forearm, and one deeper laceration to the left wrist.   Psychiatric: The patient appears calm.      Emergency Department Course     Imaging:  XR Forearm Bilateral 2 Views   Final Result   IMPRESSION:       Right: The bones are well-mineralized. The cortices are smooth. No radiopaque foreign bodies are identified. The osseous structures of the forearm and hand are intact.      Left: The bones are well-mineralized. The cortices are smooth. Laceration along the volar aspect of the distal forearm/wrist is noted. No radiopaque foreign bodies are identified. The osseous structures of the forearm and hand are intact.      XR Hand Bilateral G/E 3 Views   Final Result   IMPRESSION:       Right: The bones are well-mineralized. The cortices are smooth. No radiopaque foreign bodies are identified. The osseous structures of the forearm and hand are intact.      Left: The bones are well-mineralized. The cortices are smooth. Laceration along the volar aspect of the distal forearm/wrist is noted. No radiopaque foreign bodies are identified. The osseous structures of the forearm and hand are intact.        Report per radiology      Procedures    Laceration Repair      Procedure: Laceration Repair    Indication: Laceration    Consent: Verbal    Location:  Left Wrist    Length: 4 cm    Preparation: Irrigation with Sterile Saline.    Anesthesia/Sedation: Lidocaine with Epinephrine - 1%      Treatment/Exploration: Wound explored, no foreign bodies found     Closure: The wound was closed with one layer. Skin/superficial layer  was closed with 4 x 4-0 Nylon using Horizontal mattress sutures.  1 x 4-0 Nylon simple interrupted suture.     Patient Status: The patient tolerated the procedure well: Yes. There were no complications.      Laceration Repair      Procedure: Laceration Repair    Indication: Laceration    Consent: Verbal    Location: Right Forearm     Length: 1.5 cm    Preparation: Irrigation with Sterile Saline.    Anesthesia/Sedation: Lidocaine with Epinephrine - 1%      Treatment/Exploration: Wound explored, no foreign bodies found     Closure: The wound was closed with one layer. Skin/superficial layer was closed with 1 x 4-0 Nylon simple interrupted suture and 1 x 4-0 Nylon horizontal mattress suture.     Patient Status: The patient tolerated the procedure well: Yes. There were no complications.      Laceration Repair      Procedure: Laceration Repair    Indication: Laceration    Consent: Verbal    Location: Right Thumb    Length: 0.75 cm    Preparation: Irrigation with Sterile Saline.    Anesthesia/Sedation: Lidocaine with Epinephrine - 1%      Treatment/Exploration: Wound explored, no foreign bodies found     Closure: The wound was closed with one layer. Skin/superficial layer was closed with 1 x 4-0 Nylon Horizontal mattress suture.     Patient Status: The patient tolerated the procedure well: Yes. There were no complications.      Laceration Repair      Procedure: Laceration Repair    Indication: Laceration    Consent: Verbal    Location: Radial aspect of the right 4th finger    Length: 0.75 cm    Preparation: Irrigation with Sterile Saline.    Anesthesia/Sedation: Lidocaine with Epinephrine - 1%      Treatment/Exploration: Wound explored, no foreign bodies found     Closure: The wound was closed with one layer. Skin/superficial layer was closed with 1 x 4-0 Nylon Horizontal mattress suture.     Patient Status: The patient tolerated the procedure well: Yes. There were no complications.    Emergency Department  Course:    Reviewed:  I reviewed nursing notes, vitals, past medical history and Care Everywhere    Assessments:  ED Course as of 11/13/22 2319   [place] Nov 13, 2022 2128 I obtained the patient's history and examined him as per above.    2241 Laceration repair. See procedure notes above.    2316 Left VM message with [facility]     Disposition:  The patient was discharged to home.     Impression & Plan     CMS Diagnoses: None    Medical Decision Making:  This 57-year-old male patient presents to the ED with multiple lacerations.  Please see the HPI and exam for specifics.  The patient was intoxicated but denies suicidal ideation.  He has never done this before.  He broke a beer bottle against himself out of frustration.  Lacerations were anesthetized, cleansed, explored, and closed as above.  The patient tolerated this very well.  He does perceive decreased sensation especially in the left hand and there is some decreased strength against resistance though this may just be due to pain.  Given the laceration over the left wrist and his symptoms, I felt that referral to the hand trauma clinic was reasonable.  Given the potential contamination, I did prescribe Keflex.  The patient should follow-up as noted but may return with any new or worsening symptoms.  Anticipatory guidance including wound care given to patient and son prior to discharge.    Diagnosis:    ICD-10-CM    1. Alcoholic intoxication with complication (H)  F10.929       2. Laceration of left wrist, initial encounter  S61.512A       3. Multiple lacerations  T07.XXXA           Discharge Medications:  New Prescriptions    CEPHALEXIN (KEFLEX) 500 MG CAPSULE    Take 1 capsule (500 mg) by mouth 4 times daily for 7 days       Scribe Disclosure:  Janel CURTIS, am serving as a scribe at 9:11 PM on 11/13/2022 to document services personally performed by Momo Amezquita DO based on my observations and the provider's statements to me.             Bia, Momo Domingo,   11/13/22 2638

## 2022-11-15 ENCOUNTER — TELEPHONE (OUTPATIENT)
Dept: FAMILY MEDICINE | Facility: CLINIC | Age: 57
End: 2022-11-15

## 2022-11-15 ENCOUNTER — OFFICE VISIT (OUTPATIENT)
Dept: FAMILY MEDICINE | Facility: CLINIC | Age: 57
End: 2022-11-15
Payer: COMMERCIAL

## 2022-11-15 ENCOUNTER — TRANSFERRED RECORDS (OUTPATIENT)
Dept: HEALTH INFORMATION MANAGEMENT | Facility: CLINIC | Age: 57
End: 2022-11-15

## 2022-11-15 VITALS
TEMPERATURE: 98.2 F | OXYGEN SATURATION: 98 % | SYSTOLIC BLOOD PRESSURE: 129 MMHG | BODY MASS INDEX: 22.98 KG/M2 | WEIGHT: 143 LBS | DIASTOLIC BLOOD PRESSURE: 85 MMHG | HEART RATE: 81 BPM | RESPIRATION RATE: 16 BRPM | HEIGHT: 66 IN

## 2022-11-15 DIAGNOSIS — Z01.818 PREOP GENERAL PHYSICAL EXAM: Primary | ICD-10-CM

## 2022-11-15 DIAGNOSIS — S61.512D LACERATION OF LEFT WRIST, SUBSEQUENT ENCOUNTER: ICD-10-CM

## 2022-11-15 DIAGNOSIS — M25.532 LEFT WRIST PAIN: ICD-10-CM

## 2022-11-15 LAB
ERYTHROCYTE [DISTWIDTH] IN BLOOD BY AUTOMATED COUNT: 13.7 % (ref 10–15)
HCT VFR BLD AUTO: 49.1 % (ref 40–53)
HGB BLD-MCNC: 16.6 G/DL (ref 13.3–17.7)
MCH RBC QN AUTO: 30.9 PG (ref 26.5–33)
MCHC RBC AUTO-ENTMCNC: 33.8 G/DL (ref 31.5–36.5)
MCV RBC AUTO: 91 FL (ref 78–100)
PLATELET # BLD AUTO: 190 10E3/UL (ref 150–450)
RBC # BLD AUTO: 5.38 10E6/UL (ref 4.4–5.9)
WBC # BLD AUTO: 7.1 10E3/UL (ref 4–11)

## 2022-11-15 PROCEDURE — 36415 COLL VENOUS BLD VENIPUNCTURE: CPT | Performed by: PHYSICIAN ASSISTANT

## 2022-11-15 PROCEDURE — 85027 COMPLETE CBC AUTOMATED: CPT | Performed by: PHYSICIAN ASSISTANT

## 2022-11-15 PROCEDURE — 99213 OFFICE O/P EST LOW 20 MIN: CPT | Performed by: PHYSICIAN ASSISTANT

## 2022-11-15 RX ORDER — FOLIC ACID 1 MG/1
1000 TABLET ORAL DAILY
COMMUNITY
Start: 2022-07-25 | End: 2022-11-15

## 2022-11-15 NOTE — TELEPHONE ENCOUNTER
Spoke to patients wife, C2C on file. Informs patient needs emergency Pre-op for surgery on hand, surgery schedule 11/17/22 with Dr. Lopez at [facility].     Rossana Porter PA-C is willing to see patient in clinic today. Scheduled patient with Rossana.     Elena MENDIETA, RN, BSN, PHN - PAL (patient advocate liaison)  [facility]  [phone]

## 2022-11-15 NOTE — PATIENT INSTRUCTIONS
Preparing for Your Surgery  Getting started  A nurse will call you to review your health history and instructions. They will give you an arrival time based on your scheduled surgery time. Please be ready to share:    Your doctor s clinic name and phone number    Your medical, surgical, and anesthesia history    A list of allergies and sensitivities    A list of medicines, including herbal treatments and over-the-counter drugs    Whether the patient has a legal guardian (ask how to send us the papers in advance)  Please tell us if you re pregnant--or if there s any chance you might be pregnant. Some surgeries may injure a fetus (unborn baby), so they require a pregnancy test. Surgeries that are safe for a fetus don t always need a test, and you can choose whether to have one.   If you have a child who s having surgery, please ask for a copy of Preparing for Your Child s Surgery.    Preparing for surgery    Within 10 to 30 days of surgery: Have a pre-op exam (sometimes called an H&P, or History and Physical). This can be done at a clinic or pre-operative center.  ? If you re having a , you may not need this exam. Talk to your care team.    At your pre-op exam, talk to your care team about all medicines you take. If you need to stop any medicines before surgery, ask when to start taking them again.  ? We do this for your safety. Many medicines can make you bleed too much during surgery. Some change how well surgery (anesthesia) drugs work.    Call your insurance company to let them know you re having surgery. (If you don t have insurance, call [phone].)    Call your clinic if there s any change in your health. This includes signs of a cold or flu (sore throat, runny nose, cough, rash, fever). It also includes a scrape or scratch near the surgery site.    If you have questions on the day of surgery, call your hospital or surgery center.  COVID testing  You may need to be tested for COVID-19 before having  surgery. If so, we will give you instructions (or click here).  Eating and drinking guidelines  For your safety: Unless your surgeon tells you otherwise, follow the guidelines below.    Eat and drink as usual until 8 hours before you arrive for surgery. After that, no food or milk.    Drink clear liquids until 2 hours before you arrive. These are liquids you can see through, like water, Gatorade, and Propel Water. They also include plain black coffee and tea (no cream or milk), candy, and breath mints. You can spit out gum when you arrive.    If you drink alcohol: Stop drinking it the night before surgery.    If your care team tells you to take medicine on the morning of surgery, it s okay to take it with a sip of water.  Preventing infection    Shower or bathe the night before and morning of your surgery. Follow the instructions your clinic gave you. (If no instructions, use regular soap.)    Don t shave or clip hair near your surgery site. We ll remove the hair if needed.    Don t smoke or vape the morning of surgery. You may chew nicotine gum up to 2 hours before surgery. A nicotine patch is okay.  ? Note: Some surgeries require you to completely quit smoking and nicotine. Check with your surgeon.    Your care team will make every effort to keep you safe from infection. We will:  ? Clean our hands often with soap and water (or an alcohol-based hand rub).  ? Clean the skin at your surgery site with a special soap that kills germs.  ? Give you a special gown to keep you warm. (Cold raises the risk of infection.)  ? Wear special hair covers, masks, gowns and gloves during surgery.  ? Give antibiotic medicine, if prescribed. Not all surgeries need antibiotics.  What to bring on the day of surgery    Photo ID and insurance card    Copy of your health care directive, if you have one    Glasses and hearing aids (bring cases)  ? You can t wear contacts during surgery    Inhaler and eye drops, if you use them (tell us  about these when you arrive)    CPAP machine or breathing device, if you use them    A few personal items, if spending the night    If you have . . .  ? A pacemaker, ICD (cardiac defibrillator) or other implant: Bring the ID card.  ? An implanted stimulator: Bring the remote control.  ? A legal guardian: Bring a copy of the certified (court-stamped) guardianship papers.  Please remove any jewelry, including body piercings. Leave jewelry and other valuables at home.  If you re going home the day of surgery    You must have a responsible adult drive you home. They should stay with you overnight as well.    If you don t have someone to stay with you, and you aren t safe to go home alone, we may keep you overnight. Insurance often won t pay for this.  After surgery  If it s hard to control your pain or you need more pain medicine, please call your surgeon s office.  Questions?   If you have any questions for your care team, list them here:   ____________________________________________________________________________________________________________________________________________________________________________________________________________________________________________________________________  For informational purposes only. Not to replace the advice of your health care provider. Copyright   2003, 2019 [employer]. All rights reserved. Clinically reviewed by Etelvina Holman MD. [employer] [id number] - REV 10/22.

## 2022-11-15 NOTE — PROGRESS NOTES
[facility]  [id number] [place] [id number]  Phone: [phone]  Primary Provider: Neymar Wesley  Pre-op Performing Provider: ANIKA KELLY      PREOPERATIVE EVALUATION:  Today's date: 11/15/2022    Michael Mills is a 57 year old male who presents for a preoperative evaluation.    Surgical Information:  Surgery/Procedure: Repair left wrist  Surgery Location: [facility]  Surgeon: Dr. Lopez  Surgery Date: 11/17/2022  Time of Surgery: 9:00  Where patient plans to recover: At home with family  Fax number for surgical facility: [phone]    Type of Anesthesia Anticipated: to be determined    Assessment & Plan     The proposed surgical procedure is considered INTERMEDIATE risk.    Preop general physical exam  Patient is optimized for planned procedure.  - CBC with platelets; Future  - CBC with platelets    Left wrist pain    - CBC with platelets; Future  - CBC with platelets    Laceration of left wrist, subsequent encounter    - CBC with platelets; Future  - CBC with platelets      Risks and Recommendations:  The patient has the following additional risks and recommendations for perioperative complications:   - No identified additional risk factors other than previously addressed    Medication Instructions:  Patient is on no chronic medications    RECOMMENDATION:  APPROVAL GIVEN to proceed with proposed procedure, without further diagnostic evaluation.        Subjective     HPI related to upcoming procedure: Laceration of the left wrist surgery for further exploration    1. No - Have you ever had a heart attack or stroke?  2. No - Have you ever had surgery on your heart or blood vessels, such as a stent, coronary (heart) bypass, or surgery on an artery in the head, neck, heart, or legs?  3. No - Do you have chest pain when you are physically active?  4. No - Do you have a history of heart failure?  5. No - Do you currently have a cold, bronchitis, or symptoms of  other respiratory (head and chest) infections?  6. No - Do you have a cough, shortness of breath, or wheezing?  7. No - Do you or anyone in your family have a history of blood clots?  8. No - Do you or anyone in your family have a serious bleeding problem, such as long-lasting bleeding after surgeries or cuts?  9. No - Have you ever had anemia or been told to take iron pills?  10. No - Have you had any abnormal blood loss such as black, tarry or bloody stools, or abnormal vaginal bleeding?  11. No - Have you ever had a blood transfusion?  12. Yes - Are you willing to have a blood transfusion if it is medically needed before, during, or after your surgery?  13. No - Have you or anyone in your family ever had problems with anesthesia (sedation for surgery)?  14. No - Do you have sleep apnea, excessive snoring, or daytime drowsiness?   15. No - Do you have any artifical heart valves or other implanted medical devices, such as a pacemaker, defibrillator, or continuous glucose monitor?  16. No - Do you have any artifical joints?  17. No - Are you allergic to latex?  18. No - Is there any chance that you may be pregnant?    Health Care Directive:  Patient does not have a Health Care Directive or Living Will: Discussed advance care planning with patient; however, patient declined at this time.    Preoperative Review of :   reviewed - no record of controlled substances prescribed.      Status of Chronic Conditions:  See problem list for active medical problems.  Problems all longstanding and stable, except as noted/documented.  See ROS for pertinent symptoms related to these conditions.      Review of Systems  CONSTITUTIONAL: NEGATIVE for fever, chills, change in weight  INTEGUMENTARY/SKIN: NEGATIVE for worrisome rashes, moles or lesions  EYES: NEGATIVE for vision changes or irritation  ENT/MOUTH: NEGATIVE for ear, mouth and throat problems  RESP: NEGATIVE for significant cough or SOB  CV: NEGATIVE for chest pain,  palpitations or peripheral edema  GI: NEGATIVE for nausea, abdominal pain, heartburn, or change in bowel habits  : NEGATIVE for frequency, dysuria, or hematuria  MUSCULOSKELETAL:As noted in HPI  NEURO: NEGATIVE for weakness, dizziness or paresthesias  ENDOCRINE: NEGATIVE for temperature intolerance, skin/hair changes  HEME: NEGATIVE for bleeding problems  PSYCHIATRIC: NEGATIVE for changes in mood or affect    Patient Active Problem List    Diagnosis Date Noted     RA (rheumatoid arthritis) (H) 12/31/2014     Priority: Medium     Snoring 10/04/2013     Priority: Medium     Pain in limb 10/04/2013     Priority: Medium     Plantar warts 10/04/2013     Priority: Medium     Diverticulosis 04/26/2012     Priority: Medium     Formatting of this note might be different from the original.  Colonscopy, [facility]; 11/4/11:  Sigmoid.    [also note history of epiploic appendagitis descending colon per CT 9/2011]       Varicose veins of other specified sites 04/26/2012     Priority: Medium     Formatting of this note might be different from the original.  Endovenous ablation Dr Meier 8/2012.       History of nephrolithiasis 11/01/2011     Priority: Medium     CARDIOVASCULAR SCREENING; LDL GOAL LESS THAN 160 10/31/2010     Priority: Medium     3.7% 10 yr risk 2016       Varicose veins of lower extremities with complications 12/25/2006     Priority: Medium     Problem list name updated by automated process. Provider to review       Premature ejaculation 12/25/2006     Priority: Medium      Past Medical History:   Diagnosis Date     CARDIOVASCULAR SCREENING; LDL GOAL LESS THAN 160 10/31/2010    6.4% 10 yr risk 2021     Diverticulitis of sigmoid colon 2019    also diverticula found on colonscopy in 8/2019     Hepatic hemangioma      Plantar warts 10/04/2013     Premature ejaculation      RA (rheumatoid arthritis) (H) 12/31/2014     SKIN SENSATION DISTURB 12/25/2006    rt  hand, cts  rt wrist      Snoring 10/04/2013     Varicose  "veins of lower extremities with complications 2006     Problem list name updated by automated process. Provider to review     Past Surgical History:   Procedure Laterality Date     AS REPAIR  ANAL FISTULA,RECT ADV FLAP      Dr. Blair Bishop     CARPAL TUNNEL RELEASE RT/LT      both sides     COLONOSCOPY N/A 2019    [facility] - repeat in 10 years     varicose vein left side       Current Outpatient Medications   Medication Sig Dispense Refill     acetaminophen (TYLENOL) 500 MG tablet Take 2 tablets (1,000 mg) by mouth every 12 hours as needed for mild pain       cephALEXin (KEFLEX) 500 MG capsule Take 1 capsule (500 mg) by mouth 4 times daily for 7 days 28 capsule 0     ibuprofen (ADVIL/MOTRIN) 600 MG tablet Take 1 tablet (600 mg) by mouth every 8 hours as needed for moderate pain 30 tablet 0       Allergies   Allergen Reactions     No Known Drug Allergies         Social History     Tobacco Use     Smoking status: Never     Smokeless tobacco: Never   Substance Use Topics     Alcohol use: Yes     Comment: socially, 4-5 drinks on the weekend     Family History   Problem Relation Age of Onset     Thyroid Disease Mother         underactive      Hypertension Mother      Cerebrovascular Disease Father 67         of complications of stroke     Family History Negative Sister         1     Cancer Brother         1- at age of 37-from cancer ?origin      Family History Negative Maternal Grandmother      Myocardial Infarction Maternal Grandfather      Family History Negative Paternal Grandmother      Family History Negative Paternal Grandfather      Cancer - colorectal No family hx of      Prostate Cancer No family hx of      History   Drug Use No         Objective     /85 (BP Location: Right arm, Patient Position: Sitting, Cuff Size: Adult Large)   Pulse 81   Temp 98.2  F (36.8  C) (Oral)   Resp 16   Ht 1.676 m (5' 6\")   Wt 64.9 kg (143 lb)   SpO2 98%   BMI 23.08 kg/m      Physical " Exam    GENERAL APPEARANCE: healthy, alert and no distress     EYES: EOMI,  PERRL     HENT: ear canals and TM's normal and nose and mouth without ulcers or lesions     NECK: no adenopathy, no asymmetry, masses, or scars and thyroid normal to palpation     RESP: lungs clear to auscultation - no rales, rhonchi or wheezes     CV: regular rates and rhythm, normal S1 S2, no S3 or S4 and no murmur, click or rub     ABDOMEN:  soft, nontender, no HSM or masses and bowel sounds normal     MS: extremities normal- no gross deformities noted and left wrist covered with bandage     SKIN: no suspicious lesions or rashes     NEURO: Normal strength and tone, sensory exam grossly normal, mentation intact and speech normal     PSYCH: mentation appears normal. and affect normal/bright     LYMPHATICS: No cervical adenopathy    Recent Labs   Lab Test 06/10/22  1414 11/15/21  1628 08/23/21  1131   HGB 17.1  --  16.4     --  201   INR  --  1.08  --      --  136   POTASSIUM 4.3  --  4.9   CR 0.88  --  0.97        Diagnostics:  Recent Results (from the past 24 hour(s))   CBC with platelets    Collection Time: 11/15/22 12:20 PM   Result Value Ref Range    WBC Count 7.1 4.0 - 11.0 10e3/uL    RBC Count 5.38 4.40 - 5.90 10e6/uL    Hemoglobin 16.6 13.3 - 17.7 g/dL    Hematocrit 49.1 40.0 - 53.0 %    MCV 91 78 - 100 fL    MCH 30.9 26.5 - 33.0 pg    MCHC 33.8 31.5 - 36.5 g/dL    RDW 13.7 10.0 - 15.0 %    Platelet Count 190 150 - 450 10e3/uL      No EKG required, no history of coronary heart disease, significant arrhythmia, peripheral arterial disease or other structural heart disease.    Revised Cardiac Risk Index (RCRI):  The patient has the following serious cardiovascular risks for perioperative complications:   - No serious cardiac risks = 0 points     RCRI Interpretation: 0 points: Class I (very low risk - 0.4% complication rate)           Signed Electronically by: Rossana Porter PA-C  Copy of this evaluation report is  provided to requesting physician.

## 2022-11-21 ENCOUNTER — ALLIED HEALTH/NURSE VISIT (OUTPATIENT)
Dept: FAMILY MEDICINE | Facility: CLINIC | Age: 57
End: 2022-11-21
Payer: COMMERCIAL

## 2022-11-21 DIAGNOSIS — Z51.89 VISIT FOR WOUND CHECK: Primary | ICD-10-CM

## 2022-11-21 PROCEDURE — 99207 PR NO CHARGE NURSE ONLY: CPT

## 2022-11-21 NOTE — PROGRESS NOTES
Per review of ED provider notes sutures to be in place for 10 days. Today is day 8. Discussed with Michael will need to reschedule or be seen by provider to determine if can remove early. Michael opted to reschedule.     Kitty Osullivan R.N.

## 2022-11-23 ENCOUNTER — ALLIED HEALTH/NURSE VISIT (OUTPATIENT)
Dept: FAMILY MEDICINE | Facility: CLINIC | Age: 57
End: 2022-11-23
Payer: COMMERCIAL

## 2022-11-23 DIAGNOSIS — Z48.02 ENCOUNTER FOR REMOVAL OF SUTURES: Primary | ICD-10-CM

## 2022-11-23 PROCEDURE — 99207 PR NO CHARGE NURSE ONLY: CPT

## 2022-11-23 NOTE — PROGRESS NOTES
Michael Rodriguezcaitlin presents to the clinic for removal of sutures. The patient has had sutures in place for 10 days. There has been no patient reported signs or symptoms of infection or drainage. 3  sutures are seen and located on the Right thumb-- 1, Right forearm-- 2. Tetanus status is up to date. All sutures were easily removed today. Routine wound care discussed by the RN or provider. The patient will follow up as needed.    Had  double check I had removed all sutures and he was in agreement all sutures were successfully removed. As one area looked like had suture but did not was just scab/hair. Wound healing well, cleaned, patient instructions given and he will call if questions or concerns. Also has appointment next week at [facility] and will have them also look.     Had surgery on left arm and those sutures will be removed by [facility].     Kitty Osullivan R.N.

## 2022-11-29 ENCOUNTER — TRANSFERRED RECORDS (OUTPATIENT)
Dept: HEALTH INFORMATION MANAGEMENT | Facility: CLINIC | Age: 57
End: 2022-11-29

## 2022-12-27 ENCOUNTER — TRANSFERRED RECORDS (OUTPATIENT)
Dept: HEALTH INFORMATION MANAGEMENT | Facility: CLINIC | Age: 57
End: 2022-12-27
Payer: COMMERCIAL

## 2023-01-24 ENCOUNTER — TRANSFERRED RECORDS (OUTPATIENT)
Dept: HEALTH INFORMATION MANAGEMENT | Facility: CLINIC | Age: 58
End: 2023-01-24
Payer: COMMERCIAL

## 2023-04-01 ENCOUNTER — HEALTH MAINTENANCE LETTER (OUTPATIENT)
Age: 58
End: 2023-04-01

## 2023-04-03 ENCOUNTER — OFFICE VISIT (OUTPATIENT)
Dept: FAMILY MEDICINE | Facility: CLINIC | Age: 58
End: 2023-04-03
Payer: COMMERCIAL

## 2023-04-03 VITALS
HEIGHT: 67 IN | RESPIRATION RATE: 16 BRPM | SYSTOLIC BLOOD PRESSURE: 120 MMHG | BODY MASS INDEX: 22.91 KG/M2 | OXYGEN SATURATION: 99 % | TEMPERATURE: 98.1 F | WEIGHT: 146 LBS | HEART RATE: 78 BPM | DIASTOLIC BLOOD PRESSURE: 82 MMHG

## 2023-04-03 DIAGNOSIS — M05.79 RHEUMATOID ARTHRITIS INVOLVING MULTIPLE SITES WITH POSITIVE RHEUMATOID FACTOR (H): Primary | ICD-10-CM

## 2023-04-03 DIAGNOSIS — Z00.00 ROUTINE HISTORY AND PHYSICAL EXAMINATION OF ADULT: ICD-10-CM

## 2023-04-03 DIAGNOSIS — Z13.6 CARDIOVASCULAR SCREENING; LDL GOAL LESS THAN 160: ICD-10-CM

## 2023-04-03 DIAGNOSIS — Z78.9 HEPATITIS B IMMUNE: ICD-10-CM

## 2023-04-03 LAB
ALBUMIN SERPL BCG-MCNC: 4.2 G/DL (ref 3.5–5.2)
ALP SERPL-CCNC: 70 U/L (ref 40–129)
ALT SERPL W P-5'-P-CCNC: 18 U/L (ref 10–50)
ANION GAP SERPL CALCULATED.3IONS-SCNC: 12 MMOL/L (ref 7–15)
AST SERPL W P-5'-P-CCNC: 21 U/L (ref 10–50)
BILIRUB SERPL-MCNC: 0.7 MG/DL
BUN SERPL-MCNC: 12.9 MG/DL (ref 6–20)
CALCIUM SERPL-MCNC: 9.6 MG/DL (ref 8.6–10)
CHLORIDE SERPL-SCNC: 104 MMOL/L (ref 98–107)
CHOLEST SERPL-MCNC: 230 MG/DL
CREAT SERPL-MCNC: 0.98 MG/DL (ref 0.67–1.17)
DEPRECATED HCO3 PLAS-SCNC: 22 MMOL/L (ref 22–29)
GFR SERPL CREATININE-BSD FRML MDRD: >90 ML/MIN/1.73M2
GLUCOSE SERPL-MCNC: 110 MG/DL (ref 70–99)
HBV SURFACE AB SERPL IA-ACNC: 0.27 M[IU]/ML
HBV SURFACE AB SERPL IA-ACNC: NONREACTIVE M[IU]/ML
HBV SURFACE AG SERPL QL IA: NONREACTIVE
HDLC SERPL-MCNC: 54 MG/DL
LDLC SERPL CALC-MCNC: 160 MG/DL
NONHDLC SERPL-MCNC: 176 MG/DL
POTASSIUM SERPL-SCNC: 4.7 MMOL/L (ref 3.4–5.3)
PROT SERPL-MCNC: 7.9 G/DL (ref 6.4–8.3)
SODIUM SERPL-SCNC: 138 MMOL/L (ref 136–145)
TRIGL SERPL-MCNC: 79 MG/DL

## 2023-04-03 PROCEDURE — 80053 COMPREHEN METABOLIC PANEL: CPT | Performed by: FAMILY MEDICINE

## 2023-04-03 PROCEDURE — 99396 PREV VISIT EST AGE 40-64: CPT | Performed by: FAMILY MEDICINE

## 2023-04-03 PROCEDURE — 36415 COLL VENOUS BLD VENIPUNCTURE: CPT | Performed by: FAMILY MEDICINE

## 2023-04-03 PROCEDURE — 87340 HEPATITIS B SURFACE AG IA: CPT | Performed by: FAMILY MEDICINE

## 2023-04-03 PROCEDURE — 86706 HEP B SURFACE ANTIBODY: CPT | Performed by: FAMILY MEDICINE

## 2023-04-03 PROCEDURE — 80061 LIPID PANEL: CPT | Performed by: FAMILY MEDICINE

## 2023-04-03 SDOH — ECONOMIC STABILITY: INCOME INSECURITY: IN THE LAST 12 MONTHS, WAS THERE A TIME WHEN YOU WERE NOT ABLE TO PAY THE MORTGAGE OR RENT ON TIME?: PATIENT REFUSED

## 2023-04-03 SDOH — HEALTH STABILITY: PHYSICAL HEALTH: ON AVERAGE, HOW MANY MINUTES DO YOU ENGAGE IN EXERCISE AT THIS LEVEL?: 30 MIN

## 2023-04-03 SDOH — ECONOMIC STABILITY: TRANSPORTATION INSECURITY
IN THE PAST 12 MONTHS, HAS THE LACK OF TRANSPORTATION KEPT YOU FROM MEDICAL APPOINTMENTS OR FROM GETTING MEDICATIONS?: PATIENT DECLINED

## 2023-04-03 SDOH — HEALTH STABILITY: PHYSICAL HEALTH: ON AVERAGE, HOW MANY DAYS PER WEEK DO YOU ENGAGE IN MODERATE TO STRENUOUS EXERCISE (LIKE A BRISK WALK)?: 2 DAYS

## 2023-04-03 SDOH — ECONOMIC STABILITY: INCOME INSECURITY: HOW HARD IS IT FOR YOU TO PAY FOR THE VERY BASICS LIKE FOOD, HOUSING, MEDICAL CARE, AND HEATING?: NOT HARD AT ALL

## 2023-04-03 SDOH — ECONOMIC STABILITY: TRANSPORTATION INSECURITY
IN THE PAST 12 MONTHS, HAS LACK OF TRANSPORTATION KEPT YOU FROM MEETINGS, WORK, OR FROM GETTING THINGS NEEDED FOR DAILY LIVING?: PATIENT DECLINED

## 2023-04-03 SDOH — ECONOMIC STABILITY: FOOD INSECURITY: WITHIN THE PAST 12 MONTHS, YOU WORRIED THAT YOUR FOOD WOULD RUN OUT BEFORE YOU GOT MONEY TO BUY MORE.: PATIENT DECLINED

## 2023-04-03 SDOH — ECONOMIC STABILITY: FOOD INSECURITY: WITHIN THE PAST 12 MONTHS, THE FOOD YOU BOUGHT JUST DIDN'T LAST AND YOU DIDN'T HAVE MONEY TO GET MORE.: PATIENT DECLINED

## 2023-04-03 ASSESSMENT — ENCOUNTER SYMPTOMS
COUGH: 0
CONSTIPATION: 0
HEMATOCHEZIA: 0
HEMATURIA: 0
ABDOMINAL PAIN: 0
CHILLS: 0

## 2023-04-03 ASSESSMENT — LIFESTYLE VARIABLES
AUDIT-C TOTAL SCORE: 4
SKIP TO QUESTIONS 9-10: 0
HOW OFTEN DO YOU HAVE SIX OR MORE DRINKS ON ONE OCCASION: WEEKLY
HOW MANY STANDARD DRINKS CONTAINING ALCOHOL DO YOU HAVE ON A TYPICAL DAY: 1 OR 2
HOW OFTEN DO YOU HAVE A DRINK CONTAINING ALCOHOL: MONTHLY OR LESS

## 2023-04-03 ASSESSMENT — PAIN SCALES - GENERAL: PAINLEVEL: NO PAIN (0)

## 2023-04-03 ASSESSMENT — SOCIAL DETERMINANTS OF HEALTH (SDOH)
DO YOU BELONG TO ANY CLUBS OR ORGANIZATIONS SUCH AS CHURCH GROUPS UNIONS, FRATERNAL OR ATHLETIC GROUPS, OR SCHOOL GROUPS?: YES
HOW OFTEN DO YOU ATTEND CHURCH OR RELIGIOUS SERVICES?: MORE THAN 4 TIMES PER YEAR
IN A TYPICAL WEEK, HOW MANY TIMES DO YOU TALK ON THE PHONE WITH FAMILY, FRIENDS, OR NEIGHBORS?: ONCE A WEEK
HOW OFTEN DO YOU GET TOGETHER WITH FRIENDS OR RELATIVES?: NEVER

## 2023-04-03 NOTE — PROGRESS NOTES
SUBJECTIVE:   CC: Michael is an 58 year old who presents for preventative health visit.     He is here today with his wife.   He has no concerns but his wife is concerneda bout his risk for heart disease.   His brother had an MI last fall at age 51.   His father had bypass surgery and maternal uncles and grandfather as well.    He does have rheumatoid arthritis. And has seen rheum but is on no meds.   His CRP and ESR were high when last checked and he does have some morning stiffness in his hands but overall feels okay.   He uses occasional tylenol or advil.              View : No data to display.            Patient has been advised of split billing requirements and indicates understanding: Yes  Healthy Habits:     Getting at least 3 servings of Calcium per day:  Yes    Bi-annual eye exam:  Yes    Dental care twice a year:  Yes    Sleep apnea or symptoms of sleep apnea:  None    Diet:  Regular (no restrictions)    Frequency of exercise:  1 day/week    Duration of exercise:  15-30 minutes    Taking medications regularly:  Not Applicable    Medication side effects:  Not applicable    PHQ-2 Total Score: 0    Additional concerns today:  No              Today's PHQ-2 Score:       4/3/2023    10:42 AM   PHQ-2 ( 1999 [employer])   PHQ-2 Score Incomplete           Social History     Tobacco Use     Smoking status: Never     Smokeless tobacco: Never   Vaping Use     Vaping status: Never Used   Substance Use Topics     Alcohol use: Yes     Comment: socially, 4-5 drinks on the weekend             11/15/2021     3:29 PM   Alcohol Use   Prescreen: >3 drinks/day or >7 drinks/week? No       Last PSA: No results found for: PSA    Reviewed orders with patient. Reviewed health maintenance and updated orders accordingly - Yes  Labs reviewed in EPIC    Reviewed and updated as needed this visit by clinical staff                    Review of Systems   Constitutional: Negative for chills.   HENT: Negative for congestion.    Respiratory:  "Negative for cough.    Cardiovascular: Negative for chest pain.   Gastrointestinal: Negative for abdominal pain, constipation and hematochezia.   Genitourinary: Negative for hematuria.         OBJECTIVE:   /82   Pulse 78   Temp 98.1  F (36.7  C) (Oral)   Resp 16   Ht 1.695 m (5' 6.75\")   Wt 66.2 kg (146 lb)   SpO2 99%   BMI 23.04 kg/m      Physical Exam  GENERAL: healthy, alert and no distress  EYES: Eyes grossly normal to inspection, PERRL and conjunctivae and sclerae normal  HENT: ear canals and TM's normal, nose and mouth without ulcers or lesions  NECK: no adenopathy, no asymmetry, masses, or scars and thyroid normal to palpation  RESP: lungs clear to auscultation - no rales, rhonchi or wheezes  CV: regular rate and rhythm, normal S1 S2, no S3 or S4, no murmur, click or rub, no peripheral edema and peripheral pulses strong  ABDOMEN: soft, nontender, no hepatosplenomegaly, no masses and bowel sounds normal  MS: no gross musculoskeletal defects noted, no edema  SKIN: no suspicious lesions or rashes  NEURO: Normal strength and tone, mentation intact and speech normal  PSYCH: mentation appears normal, affect normal/bright  LYMPH: no cervical, supraclavicular, axillary, or inguinal adenopathy    Diagnostic Test Results:  Labs reviewed in Epic    ASSESSMENT/PLAN:   (M05.79) Rheumatoid arthritis involving multiple sites with positive rheumatoid factor (H)  (primary encounter diagnosis)  Comment: will refer back for ongoing care - no emergent  Plan: Adult Rheumatology  Referral            (Z13.6) CARDIOVASCULAR SCREENING; LDL GOAL LESS THAN 160  Comment: treat if LDL is between 100 and 130 or above due to risk factors of family hx and inflammation   Plan: Lipid panel reflex to direct LDL Fasting,         Comprehensive metabolic panel (BMP + Alb, Alk         Phos, ALT, AST, Total. Bili, TP)            (Z00.00) Routine history and physical examination of adult  Comment:   Plan: HEPATITIS B VACCINE " ADULT 3 DOSE IM         (ENGERIX-B/RECOMBIVAX HB), REVIEW OF HEALTH         MAINTENANCE PROTOCOL ORDERS, Lipid panel reflex        to direct LDL Fasting, Comprehensive metabolic         panel (BMP + Alb, Alk Phos, ALT, AST, Total.         Bili, TP)            (Z78.9) Hepatitis B immune  Comment: will check and he would like vaccine if not immune  Plan: Hepatitis B surface antigen, Hepatitis B         Surface Antibody              Patient has been advised of split billing requirements and indicates understanding: Yes      COUNSELING:   Reviewed preventive health counseling, as reflected in patient instructions  Special attention given to:        Regular exercise       Colorectal cancer screening        He reports that he has never smoked. He has never used smokeless tobacco.        Lucinda Badillo MD  [facility]

## 2023-04-20 ENCOUNTER — TRANSFERRED RECORDS (OUTPATIENT)
Dept: HEALTH INFORMATION MANAGEMENT | Facility: CLINIC | Age: 58
End: 2023-04-20
Payer: COMMERCIAL

## 2023-08-11 ENCOUNTER — OFFICE VISIT (OUTPATIENT)
Dept: FAMILY MEDICINE | Facility: CLINIC | Age: 58
End: 2023-08-11
Payer: COMMERCIAL

## 2023-08-11 VITALS
HEIGHT: 67 IN | SYSTOLIC BLOOD PRESSURE: 116 MMHG | RESPIRATION RATE: 18 BRPM | DIASTOLIC BLOOD PRESSURE: 82 MMHG | OXYGEN SATURATION: 97 % | HEART RATE: 78 BPM | BODY MASS INDEX: 23.62 KG/M2 | WEIGHT: 150.5 LBS | TEMPERATURE: 98 F

## 2023-08-11 DIAGNOSIS — M54.2 NECK PAIN: Primary | ICD-10-CM

## 2023-08-11 DIAGNOSIS — J20.9 ACUTE BRONCHITIS, UNSPECIFIED ORGANISM: ICD-10-CM

## 2023-08-11 DIAGNOSIS — M54.12 CERVICAL RADICULOPATHY: ICD-10-CM

## 2023-08-11 PROCEDURE — 99214 OFFICE O/P EST MOD 30 MIN: CPT | Performed by: FAMILY MEDICINE

## 2023-08-11 RX ORDER — PREDNISONE 20 MG/1
20 TABLET ORAL DAILY
Qty: 5 TABLET | Refills: 0 | Status: SHIPPED | OUTPATIENT
Start: 2023-08-11 | End: 2024-01-16

## 2023-08-11 RX ORDER — AZITHROMYCIN 250 MG/1
TABLET, FILM COATED ORAL
Qty: 6 TABLET | Refills: 0 | Status: SHIPPED | OUTPATIENT
Start: 2023-08-11 | End: 2023-08-16

## 2023-08-11 RX ORDER — CYCLOBENZAPRINE HCL 10 MG
10 TABLET ORAL
Qty: 20 TABLET | Refills: 0 | Status: SHIPPED | OUTPATIENT
Start: 2023-08-11 | End: 2024-01-16

## 2023-08-11 ASSESSMENT — PAIN SCALES - GENERAL: PAINLEVEL: NO PAIN (0)

## 2023-08-11 NOTE — PROGRESS NOTES
"  Assessment & Plan     (M54.2) Neck pain  (primary encounter diagnosis)  Comment:   Plan: Physical Therapy Referral, cyclobenzaprine         (FLEXERIL) 10 MG tablet        Diagnosed with RA   Follow up scheduled with Rheumatology .    (M54.12) Cervical radiculopathy  Comment:Discussed diagnosis in detail .    (J20.9) Acute bronchitis, unspecified organism  Comment:   Plan: predniSONE (DELTASONE) 20 MG tablet,         azithromycin (ZITHROMAX) 250 MG tablet                Flory Dominguez MD  [facility] is a 58 year [facility], presenting for the following health issues:  Shoulder Pain and Chest Pain (Tightness and Congestion ongoing X3 months. )      8/11/2023     1:51 PM   Additional Questions   Roomed by Tavia PEDRO   Accompanied by Wife,Melody       History of Present Illness       Reason for visit:  Shoulder pain  and chest cogested    He eats 0-1 servings of fruits and vegetables daily.He consumes 1 sweetened beverage(s) daily.He exercises with enough effort to increase his heart rate 20 to 29 minutes per day.  He exercises with enough effort to increase his heart rate 4 days per week.   He is taking medications regularly.         Review of Systems   Cardiovascular:  Positive for chest pain.   Musculoskeletal:  Negative for arthralgias and back pain.   Neurological:  Negative for dizziness.   Psychiatric/Behavioral:  Negative for agitation and behavioral problems.             Objective    /82 (BP Location: Right arm, Patient Position: Sitting, Cuff Size: Adult Regular)   Pulse 78   Temp 98  F (36.7  C) (Oral)   Resp 18   Ht 1.695 m (5' 6.75\")   Wt 68.3 kg (150 lb 8 oz)   SpO2 97%   BMI 23.75 kg/m    Body mass index is 23.75 kg/m .  Physical Exam  Cardiovascular:      Rate and Rhythm: Normal rate.   Pulmonary:      Effort: Pulmonary effort is normal.   Abdominal:      General: Abdomen is flat.   Musculoskeletal:         General: Normal range of motion.   Skin:     " General: Skin is warm.   Neurological:      General: No focal deficit present.   Psychiatric:         Mood and Affect: Mood normal.         Behavior: Behavior normal.

## 2023-08-15 ASSESSMENT — ENCOUNTER SYMPTOMS
BACK PAIN: 0
ARTHRALGIAS: 0
AGITATION: 0
DIZZINESS: 0

## 2023-08-31 ENCOUNTER — OFFICE VISIT (OUTPATIENT)
Dept: URGENT CARE | Facility: URGENT CARE | Age: 58
End: 2023-08-31
Payer: COMMERCIAL

## 2023-08-31 VITALS
SYSTOLIC BLOOD PRESSURE: 120 MMHG | TEMPERATURE: 97.8 F | RESPIRATION RATE: 18 BRPM | OXYGEN SATURATION: 98 % | HEART RATE: 85 BPM | DIASTOLIC BLOOD PRESSURE: 80 MMHG

## 2023-08-31 DIAGNOSIS — Z11.3 SCREEN FOR STD (SEXUALLY TRANSMITTED DISEASE): ICD-10-CM

## 2023-08-31 DIAGNOSIS — J20.9 ACUTE BRONCHITIS WITH WHEEZING: ICD-10-CM

## 2023-08-31 DIAGNOSIS — K21.00 GASTROESOPHAGEAL REFLUX DISEASE WITH ESOPHAGITIS WITHOUT HEMORRHAGE: ICD-10-CM

## 2023-08-31 DIAGNOSIS — R30.0 DYSURIA: ICD-10-CM

## 2023-08-31 DIAGNOSIS — K57.32 DIVERTICULITIS OF COLON: Primary | ICD-10-CM

## 2023-08-31 LAB
ALBUMIN UR-MCNC: NEGATIVE MG/DL
APPEARANCE UR: CLEAR
BILIRUB UR QL STRIP: NEGATIVE
COLOR UR AUTO: YELLOW
GLUCOSE UR STRIP-MCNC: 100 MG/DL
HGB UR QL STRIP: NEGATIVE
KETONES UR STRIP-MCNC: NEGATIVE MG/DL
LEUKOCYTE ESTERASE UR QL STRIP: NEGATIVE
NITRATE UR QL: NEGATIVE
PH UR STRIP: 6.5 [PH] (ref 5–7)
SP GR UR STRIP: 1.01 (ref 1–1.03)
UROBILINOGEN UR STRIP-ACNC: 0.2 E.U./DL

## 2023-08-31 PROCEDURE — 86780 TREPONEMA PALLIDUM: CPT | Performed by: PHYSICIAN ASSISTANT

## 2023-08-31 PROCEDURE — 87491 CHLMYD TRACH DNA AMP PROBE: CPT | Performed by: PHYSICIAN ASSISTANT

## 2023-08-31 PROCEDURE — 81003 URINALYSIS AUTO W/O SCOPE: CPT

## 2023-08-31 PROCEDURE — 87389 HIV-1 AG W/HIV-1&-2 AB AG IA: CPT | Performed by: PHYSICIAN ASSISTANT

## 2023-08-31 PROCEDURE — 87591 N.GONORRHOEAE DNA AMP PROB: CPT | Performed by: PHYSICIAN ASSISTANT

## 2023-08-31 PROCEDURE — 36415 COLL VENOUS BLD VENIPUNCTURE: CPT | Performed by: PHYSICIAN ASSISTANT

## 2023-08-31 PROCEDURE — 99204 OFFICE O/P NEW MOD 45 MIN: CPT | Performed by: PHYSICIAN ASSISTANT

## 2023-08-31 RX ORDER — ALBUTEROL SULFATE 90 UG/1
2 AEROSOL, METERED RESPIRATORY (INHALATION) EVERY 6 HOURS PRN
Qty: 18 G | Refills: 0 | Status: SHIPPED | OUTPATIENT
Start: 2023-08-31

## 2023-08-31 ASSESSMENT — ENCOUNTER SYMPTOMS
RHINORRHEA: 0
WHEEZING: 1
DYSURIA: 0
CHILLS: 1
VOMITING: 0
ABDOMINAL PAIN: 1
COUGH: 1
DIARRHEA: 0
FEVER: 0
CONSTIPATION: 0

## 2023-08-31 NOTE — PROGRESS NOTES
Assessment & Plan:        ICD-10-CM    1. Diverticulitis of colon  K57.32 amoxicillin-clavulanate (AUGMENTIN) 875-125 MG tablet      2. Dysuria  R30.0 UA Macroscopic with reflex to Microscopic and Culture - Lab Collect     UA Macroscopic with reflex to Microscopic and Culture - Lab Collect      3. Gastroesophageal reflux disease with esophagitis without hemorrhage  K21.00 omeprazole (PRILOSEC) 20 MG DR capsule      4. Screen for STD (sexually transmitted disease)  Z11.3 HIV Antigen Antibody Combo     Treponema Abs w Reflex to RPR and Titer     NEISSERIA GONORRHOEA PCR     CHLAMYDIA TRACHOMATIS PCR     HIV Antigen Antibody Combo     Treponema Abs w Reflex to RPR and Titer     NEISSERIA GONORRHOEA PCR     CHLAMYDIA TRACHOMATIS PCR      5. Acute bronchitis with wheezing  J20.9 albuterol (PROAIR HFA/PROVENTIL HFA/VENTOLIN HFA) 108 (90 Base) MCG/ACT inhaler            Plan/Clinical Decision Makin) LLQ pain  Patient hashad abdominal pain for 1 1/2 weeks. Has a bit of tenderness on exam of LLQ. Hx of diverticulitis in past. He is having similar symptoms. Discussed fluids, Tylenol as needed for pain and will start course of Augmentin. Afebrile, no vomiting. If worsening symptoms needs further imaging with CT scan at either [facility] or ED.   UA negative for blood today.     2) GERD  Having flare up of GERD.   Will start Prilosec course.     3) STD screen  No risk, monogamous with wife.   Wife requested screening tests and patient agreed.     4) Post viral cough/bronchitis with wheezing.   Normal exam today.   Will start albuterol inhaler as needed.     Return if symptoms worsen or fail to improve, for in 3-5 days.     At the end of the encounter, I discussed results, diagnosis, medications. Discussed red flags for immediate return to clinic/ER, as well as indications for follow up if no improvement. Patient understood and agreed to plan. Patient was stable for discharge.        Fidelia Khan PA-C on 2023 at 12:04  PM          Subjective:     HPI:    Michael is a 58 year old male who presents to clinic today for the following health issues:  Chief Complaint   Patient presents with    Back Pain     Pt reports lower back and abdomen pain, blood in urine, lightheadedness.  Pt reports being seen on 8/22/23 for a chest cold but states his symptoms are still persisting.      HPI    1) Lower abdomen and back pain started 1 1/2 weeks ago.   Last Monday had increased pain, pain left abdomen and left flank. No blood in urine.   Hx of kidney stones.   Still having pain of left side, having some bloating.   Has had a bit of reflux. Chronic issue. Taking Tums, taking a bit that last several days.   Chronic changes with stream.     2) URI symptoms.  Having some lightheadedness today.   Treated with Zithromax and Prednisone for chest cold.   Still having some wheezing and cough.     3) STD screening.   Wife would like him to get some screening tests.   No symptoms or exposure.   Patient agrees to testing.     History obtained from the patient.    Review of Systems   Constitutional:  Positive for chills (over weekend). Negative for fever.   HENT:  Negative for congestion and rhinorrhea.    Respiratory:  Positive for cough and wheezing.    Gastrointestinal:  Positive for abdominal pain. Negative for constipation, diarrhea and vomiting.   Genitourinary:  Negative for dysuria.         Patient Active Problem List   Diagnosis    Varicose veins of lower extremities with complications    Premature ejaculation    CARDIOVASCULAR SCREENING; LDL GOAL LESS THAN 160    Snoring    Pain in limb    Plantar warts    RA (rheumatoid arthritis) (H)    Diverticulosis    History of nephrolithiasis    Varicose veins of other specified sites    Seropositive rheumatoid arthritis (H)        Past Medical History:   Diagnosis Date    CARDIOVASCULAR SCREENING; LDL GOAL LESS THAN 160 10/31/2010    6.4% 10 yr risk 2021    Diverticulitis of sigmoid colon 2019    also  diverticula found on colonscopy in 8/2019    Hepatic hemangioma     Plantar warts 10/04/2013    Premature ejaculation     RA (rheumatoid arthritis) (H) 12/31/2014    SKIN SENSATION DISTURB 12/25/2006    rt  hand, cts  rt wrist     Snoring 10/04/2013    Varicose veins of lower extremities with complications 12/25/2006     Problem list name updated by automated process. Provider to review       Social History     Tobacco Use    Smoking status: Never    Smokeless tobacco: Never   Substance Use Topics    Alcohol use: Yes     Comment: socially, 4-5 drinks on the weekend             Objective:     Vitals:    08/31/23 1133   BP: 120/80   BP Location: Right arm   Patient Position: Sitting   Cuff Size: Adult Regular   Pulse: 85   Resp: 18   Temp: 97.8  F (36.6  C)   TempSrc: Tympanic   SpO2: 98%         Physical Exam   EXAM:   Pleasant, alert, appropriate appearance. NAD.  Head Exam: Normocephalic, atraumatic.  Eye Exam:  non icteric/injection.    Ear Exam: TMs grey without bulging. Normal canals.  Normal pinna.  Nose Exam: Normal external nose.    OroPharynx Exam:  Moist mucous membranes. No erythema, pharynx without exudate or hypertrophy.  Neck/Thyroid Exam:  No LAD.    Chest/Respiratory Exam: CTAB.  Cardiovascular Exam: RRR. No murmur or rubs.  ABD: soft, LLQ tenderness, normal bowel sounds, no rebound, mild guarding LLQ. No masses/organomegaly.      Results:  Results for orders placed or performed in visit on 08/31/23   UA Macroscopic with reflex to Microscopic and Culture - Lab Collect     Status: Abnormal    Specimen: Urine, Midstream   Result Value Ref Range    Color Urine Yellow Colorless, Straw, Light Yellow, Yellow    Appearance Urine Clear Clear    Glucose Urine 100 (A) Negative mg/dL    Bilirubin Urine Negative Negative    Ketones Urine Negative Negative mg/dL    Specific Gravity Urine 1.010 1.003 - 1.035    Blood Urine Negative Negative    pH Urine 6.5 5.0 - 7.0    Protein Albumin Urine Negative Negative mg/dL     Urobilinogen Urine 0.2 0.2, 1.0 E.U./dL    Nitrite Urine Negative Negative    Leukocyte Esterase Urine Negative Negative    Narrative    Microscopic not indicated

## 2023-08-31 NOTE — LETTER
August 31, 2023      Michael Mills  [address] [place] [id number]        To Whom It May Concern:    Michael Mills  was seen on today.  Please excuse him for 1 to 3 days due to illness.        Sincerely,        Fidelia Khan PA-C

## 2023-09-01 LAB
C TRACH DNA SPEC QL NAA+PROBE: NEGATIVE
HIV 1+2 AB+HIV1 P24 AG SERPL QL IA: NONREACTIVE
N GONORRHOEA DNA SPEC QL NAA+PROBE: NEGATIVE
T PALLIDUM AB SER QL: NONREACTIVE

## 2023-09-02 ENCOUNTER — APPOINTMENT (OUTPATIENT)
Dept: CT IMAGING | Facility: CLINIC | Age: 58
End: 2023-09-02
Attending: SOCIAL WORKER
Payer: COMMERCIAL

## 2023-09-02 ENCOUNTER — APPOINTMENT (OUTPATIENT)
Dept: GENERAL RADIOLOGY | Facility: CLINIC | Age: 58
End: 2023-09-02
Attending: SOCIAL WORKER
Payer: COMMERCIAL

## 2023-09-02 ENCOUNTER — HOSPITAL ENCOUNTER (EMERGENCY)
Facility: CLINIC | Age: 58
Discharge: HOME | End: 2023-09-03
Attending: SOCIAL WORKER | Admitting: SOCIAL WORKER
Payer: COMMERCIAL

## 2023-09-02 DIAGNOSIS — K57.92 DIVERTICULITIS: ICD-10-CM

## 2023-09-02 LAB
ALBUMIN SERPL BCG-MCNC: 4.1 G/DL (ref 3.5–5.2)
ALBUMIN UR-MCNC: NEGATIVE MG/DL
ALP SERPL-CCNC: 67 U/L (ref 40–129)
ALT SERPL W P-5'-P-CCNC: 32 U/L (ref 0–70)
ANION GAP SERPL CALCULATED.3IONS-SCNC: 12 MMOL/L (ref 7–15)
APPEARANCE UR: CLEAR
AST SERPL W P-5'-P-CCNC: 33 U/L (ref 0–45)
BASOPHILS # BLD AUTO: 0.1 10E3/UL (ref 0–0.2)
BASOPHILS NFR BLD AUTO: 1 %
BILIRUB SERPL-MCNC: 0.7 MG/DL
BILIRUB UR QL STRIP: NEGATIVE
BUN SERPL-MCNC: 11.6 MG/DL (ref 6–20)
CALCIUM SERPL-MCNC: 9.1 MG/DL (ref 8.6–10)
CHLORIDE SERPL-SCNC: 102 MMOL/L (ref 98–107)
COLOR UR AUTO: NORMAL
CREAT SERPL-MCNC: 1.11 MG/DL (ref 0.67–1.17)
DEPRECATED HCO3 PLAS-SCNC: 21 MMOL/L (ref 22–29)
EOSINOPHIL # BLD AUTO: 0.3 10E3/UL (ref 0–0.7)
EOSINOPHIL NFR BLD AUTO: 3 %
ERYTHROCYTE [DISTWIDTH] IN BLOOD BY AUTOMATED COUNT: 12.3 % (ref 10–15)
GFR SERPL CREATININE-BSD FRML MDRD: 78 ML/MIN/1.73M2
GLUCOSE SERPL-MCNC: 112 MG/DL (ref 70–99)
GLUCOSE UR STRIP-MCNC: NEGATIVE MG/DL
HCT VFR BLD AUTO: 46.8 % (ref 40–53)
HGB BLD-MCNC: 16.3 G/DL (ref 13.3–17.7)
HGB UR QL STRIP: NEGATIVE
IMM GRANULOCYTES # BLD: 0 10E3/UL
IMM GRANULOCYTES NFR BLD: 0 %
KETONES UR STRIP-MCNC: NEGATIVE MG/DL
LEUKOCYTE ESTERASE UR QL STRIP: NEGATIVE
LIPASE SERPL-CCNC: 37 U/L (ref 13–60)
LYMPHOCYTES # BLD AUTO: 2.1 10E3/UL (ref 0.8–5.3)
LYMPHOCYTES NFR BLD AUTO: 23 %
MCH RBC QN AUTO: 30.6 PG (ref 26.5–33)
MCHC RBC AUTO-ENTMCNC: 34.8 G/DL (ref 31.5–36.5)
MCV RBC AUTO: 88 FL (ref 78–100)
MONOCYTES # BLD AUTO: 0.8 10E3/UL (ref 0–1.3)
MONOCYTES NFR BLD AUTO: 9 %
NEUTROPHILS # BLD AUTO: 5.8 10E3/UL (ref 1.6–8.3)
NEUTROPHILS NFR BLD AUTO: 64 %
NITRATE UR QL: NEGATIVE
NRBC # BLD AUTO: 0 10E3/UL
NRBC BLD AUTO-RTO: 0 /100
PH UR STRIP: 6.5 [PH] (ref 5–7)
PLATELET # BLD AUTO: 173 10E3/UL (ref 150–450)
POTASSIUM SERPL-SCNC: 3.9 MMOL/L (ref 3.4–5.3)
PROT SERPL-MCNC: 7.7 G/DL (ref 6.4–8.3)
RBC # BLD AUTO: 5.32 10E6/UL (ref 4.4–5.9)
SODIUM SERPL-SCNC: 135 MMOL/L (ref 136–145)
SP GR UR STRIP: 1.02 (ref 1–1.03)
UROBILINOGEN UR STRIP-MCNC: NORMAL MG/DL
WBC # BLD AUTO: 9.1 10E3/UL (ref 4–11)

## 2023-09-02 PROCEDURE — 250N000011 HC RX IP 250 OP 636: Performed by: SOCIAL WORKER

## 2023-09-02 PROCEDURE — 80053 COMPREHEN METABOLIC PANEL: CPT | Performed by: SOCIAL WORKER

## 2023-09-02 PROCEDURE — 96365 THER/PROPH/DIAG IV INF INIT: CPT

## 2023-09-02 PROCEDURE — 81003 URINALYSIS AUTO W/O SCOPE: CPT | Performed by: SOCIAL WORKER

## 2023-09-02 PROCEDURE — 83690 ASSAY OF LIPASE: CPT | Performed by: SOCIAL WORKER

## 2023-09-02 PROCEDURE — 71045 X-RAY EXAM CHEST 1 VIEW: CPT

## 2023-09-02 PROCEDURE — 99285 EMERGENCY DEPT VISIT HI MDM: CPT | Mod: 25

## 2023-09-02 PROCEDURE — 74177 CT ABD & PELVIS W/CONTRAST: CPT

## 2023-09-02 PROCEDURE — 96375 TX/PRO/DX INJ NEW DRUG ADDON: CPT

## 2023-09-02 PROCEDURE — 96376 TX/PRO/DX INJ SAME DRUG ADON: CPT

## 2023-09-02 PROCEDURE — 258N000003 HC RX IP 258 OP 636: Performed by: SOCIAL WORKER

## 2023-09-02 PROCEDURE — 85025 COMPLETE CBC W/AUTO DIFF WBC: CPT | Performed by: SOCIAL WORKER

## 2023-09-02 PROCEDURE — 96361 HYDRATE IV INFUSION ADD-ON: CPT

## 2023-09-02 PROCEDURE — 36415 COLL VENOUS BLD VENIPUNCTURE: CPT | Performed by: SOCIAL WORKER

## 2023-09-02 RX ORDER — IOPAMIDOL 755 MG/ML
500 INJECTION, SOLUTION INTRAVASCULAR ONCE
Status: COMPLETED | OUTPATIENT
Start: 2023-09-02 | End: 2023-09-02

## 2023-09-02 RX ORDER — AMPICILLIN AND SULBACTAM 2; 1 G/1; G/1
3 INJECTION, POWDER, FOR SOLUTION INTRAMUSCULAR; INTRAVENOUS ONCE
Status: COMPLETED | OUTPATIENT
Start: 2023-09-03 | End: 2023-09-03

## 2023-09-02 RX ORDER — MORPHINE SULFATE 4 MG/ML
4 INJECTION, SOLUTION INTRAMUSCULAR; INTRAVENOUS ONCE
Status: COMPLETED | OUTPATIENT
Start: 2023-09-02 | End: 2023-09-02

## 2023-09-02 RX ADMIN — MORPHINE SULFATE 4 MG: 4 INJECTION, SOLUTION INTRAMUSCULAR; INTRAVENOUS at 22:46

## 2023-09-02 RX ADMIN — SODIUM CHLORIDE 1000 ML: 9 INJECTION, SOLUTION INTRAVENOUS at 22:16

## 2023-09-02 RX ADMIN — IOPAMIDOL 75 ML: 755 INJECTION, SOLUTION INTRAVENOUS at 23:08

## 2023-09-02 RX ADMIN — MORPHINE SULFATE 4 MG: 4 INJECTION, SOLUTION INTRAMUSCULAR; INTRAVENOUS at 22:16

## 2023-09-02 ASSESSMENT — ACTIVITIES OF DAILY LIVING (ADL): ADLS_ACUITY_SCORE: 35

## 2023-09-03 VITALS
DIASTOLIC BLOOD PRESSURE: 101 MMHG | SYSTOLIC BLOOD PRESSURE: 144 MMHG | OXYGEN SATURATION: 98 % | HEART RATE: 78 BPM | RESPIRATION RATE: 20 BRPM | TEMPERATURE: 98 F

## 2023-09-03 PROCEDURE — 96376 TX/PRO/DX INJ SAME DRUG ADON: CPT

## 2023-09-03 PROCEDURE — 250N000011 HC RX IP 250 OP 636: Mod: JZ | Performed by: SOCIAL WORKER

## 2023-09-03 RX ORDER — MORPHINE SULFATE 4 MG/ML
4 INJECTION, SOLUTION INTRAMUSCULAR; INTRAVENOUS ONCE
Status: DISCONTINUED | OUTPATIENT
Start: 2023-09-03 | End: 2023-09-03

## 2023-09-03 RX ORDER — OXYCODONE HYDROCHLORIDE 5 MG/1
5 TABLET ORAL EVERY 8 HOURS
Qty: 4 TABLET | Refills: 0 | Status: SHIPPED | OUTPATIENT
Start: 2023-09-03 | End: 2023-09-04

## 2023-09-03 RX ORDER — MORPHINE SULFATE 4 MG/ML
6 INJECTION, SOLUTION INTRAMUSCULAR; INTRAVENOUS ONCE
Status: COMPLETED | OUTPATIENT
Start: 2023-09-03 | End: 2023-09-03

## 2023-09-03 RX ADMIN — MORPHINE SULFATE 6 MG: 4 INJECTION, SOLUTION INTRAMUSCULAR; INTRAVENOUS at 00:37

## 2023-09-03 RX ADMIN — AMPICILLIN SODIUM AND SULBACTAM SODIUM 3 G: 2; 1 INJECTION, POWDER, FOR SOLUTION INTRAMUSCULAR; INTRAVENOUS at 00:17

## 2023-09-03 ASSESSMENT — ACTIVITIES OF DAILY LIVING (ADL): ADLS_ACUITY_SCORE: 35

## 2023-09-03 NOTE — ED PROVIDER NOTES
"  History     Chief Complaint:  Abdominal Pain       The history is provided by the patient and the spouse.      Michael Mills is a 58 year old male who presents with abdominal pain worse in the LLQ. His wife states that on Wednesday they went to urgent care and that he was prescribed an antibiotic to take to treat possible diverticulitis. The pain then was manageable and today the pain became much worse, thus presented to the ED. His wife also mentions that yesterday and today his stomach has been bloated. He also has felt light headed all day. He also notes that he has seen some [facility] a few days ago, does have hx of hemorrhoid. No fever, no urinary frequency or dysuria, no loose stools, no vomiting.     Independent Historian:   Spouse/Partner - They report per HPI    Review of External Notes:   Office visit 8/31/23: \" 1) LLQ pain Patient hashad abdominal pain for 1 1/2 weeks. Has a bit of tenderness on exam of LLQ. Hx of diverticulitis in past. He is having similar symptoms. Discussed fluids, Tylenol as needed for pain and will start course of Augmentin. Afebrile, no vomiting. If worsening symptoms needs further imaging with CT scan at either [facility] or ED.   UA negative for blood today.\"    Medications:    Albuterol   amoxicillin-clavulanate   Cyclobenzaprine   predniSONE     Past Medical History:    Diverticulitis   Hepatic hemangioma   Premature ejaculation   RA  Varicose veins of lower extremities   Plantar warts   Gastroesophageal reflux disease     Past Surgical History:    Repair anal fistula   Carpal tunnel release   Colonoscopy   Varicose vein left side     Physical Exam   Patient Vitals for the past 24 hrs:   BP Temp Temp src Pulse Resp SpO2   09/03/23 0100 (!) 144/101 98  F (36.7  C) Oral 78 20 98 %   09/03/23 0030 (!) 139/96 -- -- 74 -- 96 %   09/03/23 0000 (!) 155/96 -- -- 78 -- 97 %   09/02/23 2345 (!) 150/100 -- -- -- -- 96 %   09/02/23 2245 (!) 151/107 -- -- 76 -- --   09/02/23 2230 (!) 146/98 -- -- " 72 -- 98 %   09/02/23 2215 (!) 162/105 -- -- -- -- 97 %   09/02/23 2204 (!) 176/117 98  F (36.7  C) Oral 77 18 100 %        Physical Exam  General: Alert, interactive appropriately, overall well and nontoxic appearing although appears quite uncomfortable  HEENT: Conjunctivae clear, no scleral icterus  Neuro: AOx4, no gross deficit  CV: Regular rate and rhythm, no murmurs, pulses equal radial and DP   Respiratory: No signs of respiratory distress, lungs clear to auscultation bilaterally   Abdomen: Soft, tender to palpation primarily in LLQ however also tender throughout   MSK: No rashes lesions skin intact     Imaging:  XR Chest 1 View   Final Result   IMPRESSION: Stable chest with no acute cardiopulmonary abnormalities. Mild thoracic spinal curvature with associated mild hypertrophic changes. Slightly calcified thoracic aortic arch.      CT Abdomen Pelvis w Contrast   Final Result   IMPRESSION:    1.  Moderate acute diverticulitis at the junction of the sigmoid and descending colon. When compared to prior exam, similar location and severity.         Report per radiology    Laboratory:  Labs Ordered and Resulted from Time of ED Arrival to Time of ED Departure   COMPREHENSIVE METABOLIC PANEL - Abnormal       Result Value    Sodium 135 (*)     Potassium 3.9      Chloride 102      Carbon Dioxide (CO2) 21 (*)     Anion Gap 12      Urea Nitrogen 11.6      Creatinine 1.11      Calcium 9.1      Glucose 112 (*)     Alkaline Phosphatase 67      AST 33      ALT 32      Protein Total 7.7      Albumin 4.1      Bilirubin Total 0.7      GFR Estimate 78     LIPASE - Normal    Lipase 37     URINE MACROSCOPIC WITH REFLEX TO MICRO - Normal    Color Urine Straw      Appearance Urine Clear      Glucose Urine Negative      Bilirubin Urine Negative      Ketones Urine Negative      Specific Gravity Urine 1.018      Blood Urine Negative      pH Urine 6.5      Protein Albumin Urine Negative      Urobilinogen Urine Normal      Nitrite Urine  Negative      Leukocyte Esterase Urine Negative     CBC WITH PLATELETS AND DIFFERENTIAL    WBC Count 9.1      RBC Count 5.32      Hemoglobin 16.3      Hematocrit 46.8      MCV 88      MCH 30.6      MCHC 34.8      RDW 12.3      Platelet Count 173      % Neutrophils 64      % Lymphocytes 23      % Monocytes 9      % Eosinophils 3      % Basophils 1      % Immature Granulocytes 0      NRBCs per 100 WBC 0      Absolute Neutrophils 5.8      Absolute Lymphocytes 2.1      Absolute Monocytes 0.8      Absolute Eosinophils 0.3      Absolute Basophils 0.1      Absolute Immature Granulocytes 0.0      Absolute NRBCs 0.0          Procedures       Emergency Department Course & Assessments:       Interventions:  Medications   morphine (PF) injection 4 mg (4 mg Intravenous $Given 9/2/23 2216)   0.9% sodium chloride BOLUS (0 mLs Intravenous Stopped 9/2/23 2348)   morphine (PF) injection 4 mg (4 mg Intravenous $Given 9/2/23 2246)   sodium chloride (PF) 0.9% PF flush 100 mL (59 mLs Intravenous $Given 9/2/23 2308)   iopamidol (ISOVUE-370) solution 500 mL (75 mLs Intravenous $Given 9/2/23 2308)   ampicillin-sulbactam (UNASYN) 3 g vial to attach to  mL bag (0 g Intravenous Stopped 9/3/23 0033)   morphine (PF) injection 6 mg (6 mg Intravenous $Given 9/3/23 0037)        Assessments:  ED Course as of 09/03/23 0211   Sat Sep 02, 2023   2208 I obtained history and examined the patient as noted above.     2246 Reassessed, patient still uncomfortable. Second dose of morphine given. Rectal exam performed, Thor ABREU as chaperone,  hemorrhoid present no bleeding, nontender. No gross blood on JOSÉ LUIS.   2254 CBC and CMP grossly unremarkable    2358 CT abdomen pelvis with acute diverticulitis: IMPRESSION:   1.  Moderate acute diverticulitis at the junction of the sigmoid and descending colon. When compared to prior exam, similar location and severity.     Sun Sep 03, 2023   0015 Reassessed patient, his abdomen remains soft, he is  to  palpation the left lower quadrant, pain seems to have improved.  Denies any peritoneal signs at this time.  He would like to try another round of pain medication here and see how he is feeling.   0132 Reassessed. Patient's pain much improved.        Independent Interpretation (X-rays, CTs, rhythm strip):  None    Consultations/Discussion of Management or Tests:  None     Social Determinants of Health affecting care:   None    Disposition:  The patient was discharged to home.     Impression & Plan    CMS Diagnoses: None    Medical Decision Makin-year-old male with history of prior diverticulitis in , history of nephrolithiasis, being treated outpatient for diverticulitis who presented to to the emergency department with abdominal pain.  He denied any fever, nausea or vomiting.  He was afebrile in the emergency department.  He was overall nontoxic-appearing although he was uncomfortable appearing.  His abdomen was overall soft, he did have tenderness in the left lower quadrant primarily, there is no rigidity.  CT scan showed moderate acute diverticulitis.  He received IV pain medication here, his pain did improve.  His abdomen remained soft on repeat serial exams, no peritoneal signs.  Discussed with him that as he had received several doses of IV pain medication it would not be unreasonable for him to be admitted to the hospital for pain control.  He stated that his pain had come down to a level that he felt he could control at home and would prefer to go home.  There was no signs of perforation, abscess on the CT scan and I did not think again that he had signs of peritonitis.  With the positive CT for diverticulitis and his pain primarily left lower quadrant, I do not think that his symptoms are consistent with other etiologies such as nephrolithiasis or vascular pathology, urinalysis was negative for any blood.  Lipase was also negative.  I gave patient a very short course of pain medication to use for  breakthrough pain counseled him on Tylenol and ibuprofen usage.  I gave him return precautions.  He and his wife were comfortable with the plan, all questions were answered.  Patient already has Augmentin at home which he will continue to take.  I did give him a dose of IV antibiotics while he was here in the emergency department.  He was discharged in stable condition with instructions to follow-up with his primary care provider and to see GI as necessary outpatient.      Diagnosis:    ICD-10-CM    1. Diverticulitis  K57.92            Discharge Medications:  Discharge Medication List as of 9/3/2023  1:32 AM        START taking these medications    Details   oxyCODONE (ROXICODONE) 5 MG tablet Take 1 tablet (5 mg) by mouth every 8 hours for 1 day, Disp-4 tablet, R-0, E-Prescribe                Scribe Disclosure:  I, Mahin Noguera, am serving as a scribe at 10:18 PM on 9/2/2023 to document services personally performed by Romy Hughes MD based on my observations and the provider's statements to me.   9/2/2023   Romy Hughes MD Wu Klasek, Connie, MD  09/03/23 0211

## 2023-09-03 NOTE — DISCHARGE INSTRUCTIONS
You were seen emergency department for abdominal pain.  Your labs overall were reassuring.  The CT scan of your belly showed that you have diverticulitis.  Your pain improved after receiving some pain medication and you felt comfortable going home to try to manage the pain.  Gave you a dose of IV antibiotics here as well as some fluids.  You did not have any nausea or vomiting.    I have sent a very short course for pain medications over to the pharmacy in [place].  When you go home, please take Tylenol and ibuprofen for your pain and use the pain medication only for severe breakthrough pain.    Make sure you are drinking enough fluids.  Please come back to the emergency department if you start having a fever, if you have vomiting cannot keep anything down, your pain comes back and is severe again, you do not have any bowel movements, you have bleeding in your stool, or any other concerning symptoms.

## 2023-09-14 ENCOUNTER — TRANSFERRED RECORDS (OUTPATIENT)
Dept: HEALTH INFORMATION MANAGEMENT | Facility: CLINIC | Age: 58
End: 2023-09-14
Payer: COMMERCIAL

## 2023-09-14 LAB
ALT SERPL-CCNC: 18 IU/L (ref 5–40)
AST SERPL-CCNC: 21 U/L (ref 5–34)
CREATININE (EXTERNAL): 0.86 MG/DL (ref 0.5–1.3)
GFR ESTIMATED (EXTERNAL): 97.8 ML/MIN/1.73M2

## 2023-10-09 ENCOUNTER — OFFICE VISIT (OUTPATIENT)
Dept: FAMILY MEDICINE | Facility: CLINIC | Age: 58
End: 2023-10-09
Payer: COMMERCIAL

## 2023-10-09 VITALS
SYSTOLIC BLOOD PRESSURE: 115 MMHG | WEIGHT: 152 LBS | BODY MASS INDEX: 23.86 KG/M2 | HEART RATE: 48 BPM | HEIGHT: 67 IN | RESPIRATION RATE: 16 BRPM | DIASTOLIC BLOOD PRESSURE: 77 MMHG | TEMPERATURE: 98.1 F | OXYGEN SATURATION: 98 %

## 2023-10-09 DIAGNOSIS — K57.32 DIVERTICULITIS OF COLON: ICD-10-CM

## 2023-10-09 DIAGNOSIS — E78.5 HYPERLIPIDEMIA LDL GOAL <130: ICD-10-CM

## 2023-10-09 DIAGNOSIS — J30.2 SEASONAL ALLERGIC RHINITIS, UNSPECIFIED TRIGGER: Primary | ICD-10-CM

## 2023-10-09 PROCEDURE — 99214 OFFICE O/P EST MOD 30 MIN: CPT | Performed by: FAMILY MEDICINE

## 2023-10-09 RX ORDER — FLUTICASONE PROPIONATE 50 MCG
1 SPRAY, SUSPENSION (ML) NASAL DAILY
Qty: 16 G | Refills: 1 | Status: SHIPPED | OUTPATIENT
Start: 2023-10-09 | End: 2024-05-02

## 2023-10-09 RX ORDER — LORATADINE 10 MG/1
10 TABLET ORAL DAILY
Qty: 90 TABLET | Refills: 1 | Status: SHIPPED | OUTPATIENT
Start: 2023-10-09 | End: 2024-01-16

## 2023-10-09 RX ORDER — HYDROXYCHLOROQUINE SULFATE 200 MG/1
200 TABLET ORAL 2 TIMES DAILY
COMMUNITY
Start: 2023-09-14

## 2023-10-09 NOTE — PROGRESS NOTES
Assessment & Plan     Seasonal allergic rhinitis, unspecified trigger    - loratadine (CLARITIN) 10 MG tablet; Take 1 tablet (10 mg) by mouth daily  - fluticasone (FLONASE) 50 MCG/ACT nasal spray; Spray 1 spray into both nostrils daily    Diverticulitis of colon  - patient requesting GI Follow up .   Symptoms now improved .  - Adult GI  Referral - Consult Only; Future    Hyperlipidemia LDL goal <130    - Lipid panel reflex to direct LDL Fasting; Future    Rheumatoid arthritis   Patient is following with rheumatologist .     MED REC REQUIRED  Post Medication Reconciliation Status:     Flory Dominguez MD  [facility]  The 10-year ASCVD risk score (Irina DAVIS, et al., 2019) is: 5.6%    Values used to calculate the score:      Age: 58 years      Sex: Male      Is Non- : No      Diabetic: No      Tobacco smoker: No      Systolic Blood Pressure: 115 mmHg      Is BP treated: No      HDL Cholesterol: 54 mg/dL      Total Cholesterol: 230 mg/dL   Pancho Rodriguez is a 58 year old, presenting for the following health issues:  ER F/U (Was seen [facility] ER on 09/02/2023 for  abdominal pain)        10/9/2023     3:30 PM   Additional Questions   Roomed by Stefania Barrera   Accompanied by spouse       HPI   Was in the ER For diverticulitis     ED/UC Followup:    Facility:  [facility]  Date of visit: 09/02/2023  Reason for visit: abdominal pain  Current Status: abdominal pain now resolved .      Review of Systems   HENT:  Positive for congestion, postnasal drip, rhinorrhea, sinus pressure, sinus pain and sneezing.    Respiratory:  Negative for apnea.    Gastrointestinal:  Negative for abdominal distention.   Musculoskeletal:  Negative for arthralgias and back pain.   Neurological:  Negative for headaches.   Psychiatric/Behavioral:  Negative for agitation and behavioral problems.          Objective    There were no vitals taken for this visit.  There is no height or  weight on file to calculate BMI.  Physical Exam  HENT:      Nose: Congestion and rhinorrhea present.      Mouth/Throat:      Pharynx: Posterior oropharyngeal erythema present.   Pulmonary:      Effort: Pulmonary effort is normal.   Abdominal:      General: Abdomen is flat.   Musculoskeletal:         General: Normal range of motion.   Skin:     General: Skin is warm.   Neurological:      General: No focal deficit present.   Psychiatric:         Mood and Affect: Mood normal.         Behavior: Behavior normal.

## 2023-10-10 ENCOUNTER — TELEPHONE (OUTPATIENT)
Dept: FAMILY MEDICINE | Facility: CLINIC | Age: 58
End: 2023-10-10
Payer: COMMERCIAL

## 2023-10-10 NOTE — TELEPHONE ENCOUNTER
Fax received from [employer] regarding Fluticasone nasal spray request. Dr. Dominguez asked if I could call patient and let hi know available OTC as [facility]'s informed medication is not covered under his plan.     No answer, left message to call back.     Kitty Osullivan R.N.

## 2023-10-11 ASSESSMENT — ENCOUNTER SYMPTOMS
ARTHRALGIAS: 0
ABDOMINAL DISTENTION: 0
APNEA: 0
SINUS PAIN: 1
AGITATION: 0
HEADACHES: 0
RHINORRHEA: 1
SINUS PRESSURE: 1
BACK PAIN: 0

## 2023-10-21 ENCOUNTER — IMMUNIZATION (OUTPATIENT)
Dept: FAMILY MEDICINE | Facility: CLINIC | Age: 58
End: 2023-10-21
Payer: COMMERCIAL

## 2023-10-21 ENCOUNTER — LAB (OUTPATIENT)
Dept: LAB | Facility: CLINIC | Age: 58
End: 2023-10-21
Payer: COMMERCIAL

## 2023-10-21 DIAGNOSIS — E78.5 HYPERLIPIDEMIA LDL GOAL <130: ICD-10-CM

## 2023-10-21 LAB
CHOLEST SERPL-MCNC: 194 MG/DL
HDLC SERPL-MCNC: 40 MG/DL
LDLC SERPL CALC-MCNC: 138 MG/DL
NONHDLC SERPL-MCNC: 154 MG/DL
TRIGL SERPL-MCNC: 82 MG/DL

## 2023-10-21 PROCEDURE — 90471 IMMUNIZATION ADMIN: CPT

## 2023-10-21 PROCEDURE — 36415 COLL VENOUS BLD VENIPUNCTURE: CPT

## 2023-10-21 PROCEDURE — 80061 LIPID PANEL: CPT

## 2023-10-21 PROCEDURE — 90682 RIV4 VACC RECOMBINANT DNA IM: CPT

## 2023-11-19 ENCOUNTER — OFFICE VISIT (OUTPATIENT)
Dept: URGENT CARE | Facility: URGENT CARE | Age: 58
End: 2023-11-19
Payer: COMMERCIAL

## 2023-11-19 VITALS
HEART RATE: 73 BPM | SYSTOLIC BLOOD PRESSURE: 122 MMHG | TEMPERATURE: 97.4 F | DIASTOLIC BLOOD PRESSURE: 84 MMHG | OXYGEN SATURATION: 100 % | RESPIRATION RATE: 16 BRPM

## 2023-11-19 DIAGNOSIS — R30.0 DYSURIA: ICD-10-CM

## 2023-11-19 DIAGNOSIS — R31.9 HEMATURIA, UNSPECIFIED TYPE: Primary | ICD-10-CM

## 2023-11-19 LAB
ALBUMIN UR-MCNC: NEGATIVE MG/DL
APPEARANCE UR: CLEAR
BACTERIA #/AREA URNS HPF: ABNORMAL /HPF
BILIRUB UR QL STRIP: NEGATIVE
COLOR UR AUTO: YELLOW
GLUCOSE UR STRIP-MCNC: NEGATIVE MG/DL
HGB UR QL STRIP: ABNORMAL
KETONES UR STRIP-MCNC: NEGATIVE MG/DL
LEUKOCYTE ESTERASE UR QL STRIP: NEGATIVE
NITRATE UR QL: NEGATIVE
PH UR STRIP: 7 [PH] (ref 5–7)
RBC #/AREA URNS AUTO: ABNORMAL /HPF
SP GR UR STRIP: 1.01 (ref 1–1.03)
UROBILINOGEN UR STRIP-ACNC: 0.2 E.U./DL
WBC #/AREA URNS AUTO: ABNORMAL /HPF

## 2023-11-19 PROCEDURE — 99214 OFFICE O/P EST MOD 30 MIN: CPT | Performed by: FAMILY MEDICINE

## 2023-11-19 PROCEDURE — 81001 URINALYSIS AUTO W/SCOPE: CPT

## 2023-11-19 PROCEDURE — 87491 CHLMYD TRACH DNA AMP PROBE: CPT | Performed by: FAMILY MEDICINE

## 2023-11-19 PROCEDURE — 87591 N.GONORRHOEAE DNA AMP PROB: CPT | Performed by: FAMILY MEDICINE

## 2023-11-19 NOTE — PROGRESS NOTES
Assessment & Plan     Dysuria     - UA Macroscopic with reflex to Microscopic and Culture - Lab Collect  - UA Macroscopic with reflex to Microscopic and Culture - Lab Collect  - UA Microscopic with Reflex to Culture    Hematuria, unspecified type     - Adult Urology  Referral  - CT Abdomen Pelvis w/o Contrast    # Hematuria: Symptoms over the past couple of days.  He does have a history of kidney stone at age 16.  On examination he has mild tenderness to palpation over the left lower quadrant otherwise genital exam is unremarkable.  Urinalysis today showing positive blood no concern for UTI at this point.  Of note no concern for significant obstructing stone currently.  Of note patient does have history of diverticulitis on the left side that was seen on abdominal CT in September.  Given this plan to get a CT urogram and have patient follow-up with urology   Testing Findings: Urinalysis  Treatment: Stay hydrated, CT urogram, referral to urology placed, GC/Chlamydia added as well  Medications:  Limited tylenol/ibuprofen for pain for 1-2 weeks   Follow-up: With primary care or urology after noncontrast CT to go over the results and further treatment plan         Return in about 2 weeks (around 12/3/2023).    Rodrick Sagastume MD  [facility] [place]    Pancho Rodriguez is a 58 year old male who presents to clinic today for the following health issues:  Chief Complaint   Patient presents with    Urinary Problem     Pt reports X 2 days blood in urine, lower L sided abdomen and back pain, chills.       Urgent Care     Wheezing, pt states he was seen for a chest cold and was Rx medication but is still having difficulties with SOB     HPI       UTI    Onset of symptoms was 2day(s).  Course of illness is same  Severity mild  Has HO kidney stone at age 16   Current and associated symptoms blood in urine  Treatment and measures tried None  Predisposing factors include none  Patient denies  chills    Had some mild wheezing no shortness of breath      Review of Systems  Constitutional, HEENT, cardiovascular, pulmonary, gi and gu systems are negative, except as otherwise noted.      Objective    /84 (BP Location: Right arm, Patient Position: Sitting, Cuff Size: Adult Regular)   Pulse 73   Temp 97.4  F (36.3  C) (Tympanic)   Resp 16   SpO2 100%   Physical Exam  Vitals reviewed.   HENT:      Head: Normocephalic.   Abdominal:      Tenderness: There is no right CVA tenderness, left CVA tenderness or rebound.   Genitourinary:     Penis: Normal.       Testes: Normal.      Comments: No palpable hernia, mild TTP over left lower quadrant, no peritoneal signs    Neurological:      Mental Status: He is alert.           Reviewed urinalysis, GC chlamydia pending

## 2023-11-19 NOTE — PATIENT INSTRUCTIONS
# Hematuria: Symptoms over the past couple of days.  He does have a history of kidney stone at age 16.  On examination he has mild tenderness to palpation over the left lower quadrant otherwise genital exam is unremarkable.  Urinalysis today showing positive blood no concern for UTI at this point.  Of note no concern for significant obstructing stone currently.  Given this plan to get noncontrast CT and have patient follow-up with urology or primary care  Testing Findings: Urinalysis  Treatment: Stay hydrated, noncontrast CT ordered, referral to urology placed, GC/Chlamydia added as well  Medications:  Limited tylenol/ibuprofen for pain for 1-2 weeks   Follow-up: With primary care or urology after noncontrast CT to go over the results and further treatment plan    If you have not yet received the influenza vaccine but would like to get one, please call  [phone] or you can schedule via MessageOne    It was great seeing you today!    Rodrick Sagastume MD, CAQSM

## 2023-11-21 LAB
C TRACH DNA SPEC QL PROBE+SIG AMP: NEGATIVE
N GONORRHOEA DNA SPEC QL NAA+PROBE: NEGATIVE

## 2023-11-21 NOTE — TELEPHONE ENCOUNTER
MEDICAL RECORDS REQUEST   [facility]  Urology Clinic  [address] [place] [address]  PHONE: [phone]  Fax: [phone]        FUTURE VISIT INFORMATION                                                   Michael Mills, : 1965 scheduled for future visit at [facility]    APPOINTMENT INFORMATION:  Date: 2023  Provider:  Clifton Brown MD  Reason for Visit/Diagnosis: gross hematuria    REFERRAL INFORMATION:  Referring provider:  Rodrick Sagastume MD in  URGENT CARE      RECORDS REQUESTED FOR VISIT                                                     NOTES  STATUS/DETAILS   OFFICE NOTE from referring provider  yes, 2023 -- Rodrick Sagastume MD in  URGENT CARE   MEDICATION LIST  yes   LABS     URINALYSIS (UA)  yes   images  yes, 2023 -- XR CHEST  2023 -- CT ABD PELVIS     PRE-VISIT CHECKLIST      Joint diagnostic appointment coordinated correctly          (ensure right order & amount of time) Yes   RECORD COLLECTION COMPLETE Yes

## 2023-11-27 ENCOUNTER — HOSPITAL ENCOUNTER (OUTPATIENT)
Dept: CT IMAGING | Facility: CLINIC | Age: 58
Discharge: HOME | End: 2023-11-27
Attending: FAMILY MEDICINE | Admitting: FAMILY MEDICINE
Payer: COMMERCIAL

## 2023-11-27 DIAGNOSIS — R31.9 HEMATURIA, UNSPECIFIED TYPE: ICD-10-CM

## 2023-11-27 PROCEDURE — 74178 CT ABD&PLV WO CNTR FLWD CNTR: CPT

## 2023-11-27 PROCEDURE — 250N000011 HC RX IP 250 OP 636: Performed by: FAMILY MEDICINE

## 2023-11-27 PROCEDURE — 250N000009 HC RX 250: Performed by: FAMILY MEDICINE

## 2023-11-27 RX ORDER — IOPAMIDOL 755 MG/ML
77 INJECTION, SOLUTION INTRAVASCULAR ONCE
Status: COMPLETED | OUTPATIENT
Start: 2023-11-27 | End: 2023-11-27

## 2023-11-27 RX ADMIN — IOPAMIDOL 77 ML: 755 INJECTION, SOLUTION INTRAVENOUS at 17:04

## 2023-11-27 RX ADMIN — SODIUM CHLORIDE 66 ML: 9 INJECTION, SOLUTION INTRAVENOUS at 17:04

## 2023-12-14 ENCOUNTER — LAB (OUTPATIENT)
Dept: ONCOLOGY | Facility: CLINIC | Age: 58
End: 2023-12-14
Attending: UROLOGY
Payer: COMMERCIAL

## 2023-12-14 ENCOUNTER — OFFICE VISIT (OUTPATIENT)
Dept: ONCOLOGY | Facility: CLINIC | Age: 58
End: 2023-12-14
Attending: UROLOGY
Payer: COMMERCIAL

## 2023-12-14 ENCOUNTER — PRE VISIT (OUTPATIENT)
Dept: UROLOGY | Facility: CLINIC | Age: 58
End: 2023-12-14
Payer: COMMERCIAL

## 2023-12-14 VITALS
RESPIRATION RATE: 14 BRPM | WEIGHT: 148.2 LBS | TEMPERATURE: 97.6 F | HEIGHT: 67 IN | OXYGEN SATURATION: 98 % | DIASTOLIC BLOOD PRESSURE: 84 MMHG | SYSTOLIC BLOOD PRESSURE: 122 MMHG | HEART RATE: 83 BPM | BODY MASS INDEX: 23.26 KG/M2

## 2023-12-14 DIAGNOSIS — R39.9 LOWER URINARY TRACT SYMPTOMS (LUTS): Primary | ICD-10-CM

## 2023-12-14 DIAGNOSIS — R31.9 HEMATURIA, UNSPECIFIED TYPE: ICD-10-CM

## 2023-12-14 DIAGNOSIS — R39.9 LOWER URINARY TRACT SYMPTOMS (LUTS): ICD-10-CM

## 2023-12-14 LAB — PSA SERPL DL<=0.01 NG/ML-MCNC: 2.12 NG/ML (ref 0–3.5)

## 2023-12-14 PROCEDURE — 36415 COLL VENOUS BLD VENIPUNCTURE: CPT

## 2023-12-14 PROCEDURE — 99213 OFFICE O/P EST LOW 20 MIN: CPT | Performed by: UROLOGY

## 2023-12-14 PROCEDURE — 52000 CYSTOURETHROSCOPY: CPT | Performed by: UROLOGY

## 2023-12-14 PROCEDURE — 99213 OFFICE O/P EST LOW 20 MIN: CPT | Mod: 25 | Performed by: UROLOGY

## 2023-12-14 PROCEDURE — 99204 OFFICE O/P NEW MOD 45 MIN: CPT | Mod: 25 | Performed by: UROLOGY

## 2023-12-14 PROCEDURE — 84153 ASSAY OF PSA TOTAL: CPT | Performed by: UROLOGY

## 2023-12-14 RX ORDER — TAMSULOSIN HYDROCHLORIDE 0.4 MG/1
0.4 CAPSULE ORAL DAILY
Qty: 90 CAPSULE | Refills: 3 | Status: SHIPPED | OUTPATIENT
Start: 2023-12-14 | End: 2024-05-02

## 2023-12-14 ASSESSMENT — PAIN SCALES - GENERAL: PAINLEVEL: SEVERE PAIN (6)

## 2023-12-14 NOTE — PROGRESS NOTES
Medical Assistant Note:  Michael Mills presents today for blood draw.    Patient seen by provider today: Yes: Dr Brown .   present during visit today: Not Applicable.    Concerns: No Concerns.    Procedure:  Lab draw site: rt antecub, Needle type: butterfly, Gauge: 23.    Post Assessment:  Labs drawn without difficulty: Yes.    Discharge Plan:  Departure Mode: Ambulatory.    Face to Face Time: 10.    Phyllis Longo CMA

## 2023-12-14 NOTE — NURSING NOTE
"Oncology Rooming Note    December 14, 2023 1:08 PM   Michael Mills is a 58 year old male who presents for:    Chief Complaint   Patient presents with    Oncology Clinic Visit     New patient     Initial Vitals: /84   Pulse 83   Temp 97.6  F (36.4  C) (Oral)   Resp 14   Ht 1.689 m (5' 6.5\")   Wt 67.2 kg (148 lb 3.2 oz)   SpO2 98%   BMI 23.56 kg/m   Estimated body mass index is 23.56 kg/m  as calculated from the following:    Height as of this encounter: 1.689 m (5' 6.5\").    Weight as of this encounter: 67.2 kg (148 lb 3.2 oz). Body surface area is 1.78 meters squared.  Severe Pain (6) Comment: Data Unavailable   No LMP for male patient.  Allergies reviewed: Yes  Medications reviewed: Yes    Medications: Medication refills not needed today.  Pharmacy name entered into [facility]: [id number] - [place], [place] - [address] AT [facility] [id number]    Clinical concerns: New patient-hematuria       Anel Milligan MA              "

## 2023-12-14 NOTE — PROGRESS NOTES
Nursing Note:  Michael Mills presents today for cystoscopy.  Patient seen by provider today: Yes: Dr. Brown.   present during visit today: Not Applicable.    Note: N/A.    Labs:  Not Applicable.     Procedure:  Urology Procedure: Cystoscopy.    Verified:  Not Applicable.       Post Procedure:  Patient tolerated the procedure without incident..    Post Pain Assessment: 3 out of 10.    Discharge Plan:   Departure Mode: Ambulatory.    Anel Milligan MA

## 2023-12-14 NOTE — PROGRESS NOTES
Urology clinic   Hematuria               Chief Complaint:   Hematuria           Consult or Referral:     Michael Mills is a 58 year old male seen in consultation from Dr. Sagastume.         History of Present Illness:    Michael Mills is a very pleasant 58 year old male who presents with a history of gross  hematuria in the past 2-3 weeks.  He had a CT urogram which was unremarkable.  He does not report any similar episodes in the past but reports blood in the semen at times.  He also has some associated lower urinary tract symptoms including hesitancy and dribbling.  He has been having left groin and testicular pain in the past 2 weeks.  He denies any dysuria.     He is a non-smoker and does not report any family history of bladder cancer.    Here with his wife Lachelle          Past Medical History:     Past Medical History:   Diagnosis Date    CARDIOVASCULAR SCREENING; LDL GOAL LESS THAN 160 10/31/2010    6.4% 10 yr risk 2021    Diverticulitis of sigmoid colon 2019    also diverticula found on colonscopy in 8/2019    Hepatic hemangioma     Plantar warts 10/04/2013    Premature ejaculation     RA (rheumatoid arthritis) (H) 12/31/2014    SKIN SENSATION DISTURB 12/25/2006    rt  hand, cts  rt wrist     Snoring 10/04/2013    Varicose veins of lower extremities with complications 12/25/2006     Problem list name updated by automated process. Provider to review            Past Surgical History:     Past Surgical History:   Procedure Laterality Date    AS REPAIR  ANAL FISTULA,RECT ADV FLAP  2008    Dr. Blair Bishop    CARPAL TUNNEL RELEASE RT/LT  2019    both sides    COLONOSCOPY N/A 8/16/2019    [facility] - repeat in 10 years    varicose vein left side              Medications     Current Outpatient Medications   Medication    acetaminophen (TYLENOL) 500 MG tablet    albuterol (PROAIR HFA/PROVENTIL HFA/VENTOLIN HFA) 108 (90 Base) MCG/ACT inhaler    fluticasone (FLONASE) 50 MCG/ACT nasal spray    omeprazole  (PRILOSEC) 20 MG DR capsule    PLAQUENIL 200 MG tablet    cyclobenzaprine (FLEXERIL) 10 MG tablet    ibuprofen (ADVIL/MOTRIN) 600 MG tablet    loratadine (CLARITIN) 10 MG tablet    predniSONE (DELTASONE) 20 MG tablet     No current facility-administered medications for this visit.            Family History:     Family History   Problem Relation Age of Onset    Thyroid Disease Mother         underactive     Hypertension Mother     Cerebrovascular Disease Father 67         of complications of stroke    Heart Disease Father     Family History Negative Sister         1    Cancer Brother         1- at age of 37-from cancer ?origin     Myocardial Infarction Brother 51    Family History Negative Maternal Grandmother     Myocardial Infarction Maternal Grandfather     Family History Negative Paternal Grandmother     Family History Negative Paternal Grandfather     Myocardial Infarction Maternal Uncle     Cancer - colorectal No family hx of     Prostate Cancer No family hx of             Social History:     Social History     Socioeconomic History    Marital status:      Spouse name: Lachelle    Number of children: 3    Years of education: Not on file    Highest education level: Not on file   Occupational History    Not on file   Tobacco Use    Smoking status: Never     Passive exposure: Never    Smokeless tobacco: Never   Vaping Use    Vaping Use: Never used   Substance and Sexual Activity    Alcohol use: Yes     Comment: socially, 4-5 drinks on the weekend    Drug use: No    Sexual activity: Yes     Partners: Female     Comment: wife hysterectomy   Other Topics Concern    Parent/sibling w/ CABG, MI or angioplasty before 65F 55M? No   Social History Narrative     since     Working full time, working for [employer]    3 sons    No pets     Social Determinants of Health     Financial Resource Strain: Unknown (10/9/2023)    Financial Resource Strain     Within the past 12 months, have you or your  family members you live with been unable to get utilities (heat, electricity) when it was really needed?: Patient refused   Food Insecurity: Low Risk  (10/9/2023)    Food Insecurity     Within the past 12 months, did you worry that your food would run out before you got money to buy more?: No     Within the past 12 months, did the food you bought just not last and you didn t have money to get more?: Patient refused   Transportation Needs: Unknown (10/9/2023)    Transportation Needs     Within the past 12 months, has lack of transportation kept you from medical appointments, getting your medicines, non-medical meetings or appointments, work, or from getting things that you need?: Patient refused   Physical Activity: Insufficiently Active (4/3/2023)    Exercise Vital Sign     Days of Exercise per Week: 2 days     Minutes of Exercise per Session: 30 min   Stress: No Stress Concern Present (4/3/2023)    [employer] - Occupational Stress Questionnaire     Feeling of Stress : Only a little   Social Connections: Moderately Integrated (4/3/2023)    Social Connection and Isolation Panel [NHANES]     Frequency of Communication with Friends and Family: Once a week     Frequency of Social Gatherings with Friends and Family: Never     Attends Hindu Services: More than 4 times per year     Active Member of Clubs or Organizations: Yes     Attends Club or Organization Meetings: Not on file     Marital Status:    Interpersonal Safety: Low Risk  (10/9/2023)    Interpersonal Safety     Do you feel physically and emotionally safe where you currently live?: Yes     Within the past 12 months, have you been hit, slapped, kicked or otherwise physically hurt by someone?: No     Within the past 12 months, have you been humiliated or emotionally abused in other ways by your partner or ex-partner?: No   Housing Stability: Unknown (10/9/2023)    Housing Stability     Do you have housing? : Patient refused  "    Are you worried about losing your housing?: Patient refused            Allergies:   No known drug allergy         Review of Systems:  From intake questionnaire     Negative 14 system review except as noted on HPI, nurse's note.         Physical Exam:     Patient is a 58 year old  male    Vitals: Blood pressure 122/84, pulse 83, temperature 97.6  F (36.4  C), temperature source Oral, resp. rate 14, height 1.689 m (5' 6.5\"), weight 67.2 kg (148 lb 3.2 oz), SpO2 98%. Body mass index is 23.56 kg/m .  General Appearance Adult: Alert, no acute distress, oriented  HENT: throat/mouth:normal, good dentition  Lungs: no respiratory distress, or pursed lip breathing  Heart: No obvious jugular venous distension present  Abdomen: soft, nontender, no organomegaly or masses, scars noted  Lymphatics: No cervical or supraclavicular adenopathy  Musculoskeltal: extremities normal, no peripheral edema  Skin: no visible suspicious lesions or rashes  Neuro: Alert, oriented, speech and mentation normal  Psych: affect and mood normal  Gait: Normal  : deferred to cystoscopy        Labs and Pathology:    I reviewed all applicable laboratory and pathology data and went over findings with patient  Significant for     Lab Results   Component Value Date    WBC 9.1 09/02/2023    WBC 8.9 01/24/2020     Lab Results   Component Value Date    RBC 5.32 09/02/2023    RBC 5.63 01/24/2020     Lab Results   Component Value Date    HGB 16.3 09/02/2023    HGB 17.3 01/24/2020     Lab Results   Component Value Date    HCT 46.8 09/02/2023    HCT 52.0 01/24/2020     No components found for: \"MCT\"  Lab Results   Component Value Date    MCV 88 09/02/2023    MCV 92 01/24/2020     Lab Results   Component Value Date    MCH 30.6 09/02/2023    MCH 30.7 01/24/2020     Lab Results   Component Value Date    MCHC 34.8 09/02/2023    MCHC 33.3 01/24/2020     Lab Results   Component Value Date    RDW 12.3 09/02/2023    RDW 12.7 01/24/2020     Lab Results   Component " Value Date     09/02/2023     01/24/2020       Last Comprehensive Metabolic Panel:  Sodium   Date Value Ref Range Status   09/02/2023 135 (L) 136 - 145 mmol/L Final   01/24/2020 135 133 - 144 mmol/L Final     Potassium   Date Value Ref Range Status   09/02/2023 3.9 3.4 - 5.3 mmol/L Final   06/10/2022 4.3 3.4 - 5.3 mmol/L Final   01/24/2020 4.2 3.4 - 5.3 mmol/L Final     Chloride   Date Value Ref Range Status   09/02/2023 102 98 - 107 mmol/L Final   06/10/2022 106 94 - 109 mmol/L Final   01/24/2020 106 94 - 109 mmol/L Final     Carbon Dioxide   Date Value Ref Range Status   01/24/2020 26 20 - 32 mmol/L Final     Carbon Dioxide (CO2)   Date Value Ref Range Status   09/02/2023 21 (L) 22 - 29 mmol/L Final   06/10/2022 27 20 - 32 mmol/L Final     Anion Gap   Date Value Ref Range Status   09/02/2023 12 7 - 15 mmol/L Final   06/10/2022 4 3 - 14 mmol/L Final   01/24/2020 3 3 - 14 mmol/L Final     Glucose   Date Value Ref Range Status   09/02/2023 112 (H) 70 - 99 mg/dL Final   06/10/2022 86 70 - 99 mg/dL Final   01/24/2020 93 70 - 99 mg/dL Final     Urea Nitrogen   Date Value Ref Range Status   09/02/2023 11.6 6.0 - 20.0 mg/dL Final   06/10/2022 14 7 - 30 mg/dL Final   01/24/2020 12 7 - 30 mg/dL Final     Creatinine   Date Value Ref Range Status   09/02/2023 1.11 0.67 - 1.17 mg/dL Final   01/24/2020 0.97 0.66 - 1.25 mg/dL Final     GFR Estimate   Date Value Ref Range Status   09/02/2023 78 >60 mL/min/1.73m2 Final   01/24/2020 89 >60 mL/min/[1.73_m2] Final     Comment:     Non  GFR Calc  Starting 12/18/2018, serum creatinine based estimated GFR (eGFR) will be   calculated using the Chronic Kidney Disease Epidemiology Collaboration   (CKD-EPI) equation.     01/24/2020 78 >60 mL/min/[1.73_m2] Final     Calcium   Date Value Ref Range Status   09/02/2023 9.1 8.6 - 10.0 mg/dL Final   01/24/2020 8.9 8.5 - 10.1 mg/dL Final     Bilirubin Total   Date Value Ref Range Status   09/02/2023 0.7 <=1.2 mg/dL  "Final   08/26/2019 0.5 0.2 - 1.3 mg/dL Final     Alkaline Phosphatase   Date Value Ref Range Status   09/02/2023 67 40 - 129 U/L Final   08/26/2019 65 40 - 150 U/L Final     ALT   Date Value Ref Range Status   09/02/2023 32 0 - 70 U/L Final     Comment:     Reference intervals for this test were updated on 6/12/2023 to more accurately reflect our healthy population. There may be differences in the flagging of prior results with similar values performed with this method. Interpretation of those prior results can be made in the context of the updated reference intervals.     08/26/2019 20 0 - 70 U/L Final     AST   Date Value Ref Range Status   09/02/2023 33 0 - 45 U/L Final     Comment:     Reference intervals for this test were updated on 6/12/2023 to more accurately reflect our healthy population. There may be differences in the flagging of prior results with similar values performed with this method. Interpretation of those prior results can be made in the context of the updated reference intervals.   08/26/2019 19 0 - 45 U/L Final       INR/Prothrombin Time    Creatinine   Date Value Ref Range Status   09/02/2023 1.11 0.67 - 1.17 mg/dL Final   01/24/2020 0.97 0.66 - 1.25 mg/dL Final        No results found for: \"PSA\"        Imaging:    I reviewed all applicable imaging and went over findings with patient.  No evidence of hydronephrosis, filling defect, or retroperitoneal lymphadenopathy.    Cystoscopy procedure     After sterile preparation and draping of the patient,  a 16-Yoruba flexible cystoscope was introduced via the urethra.  It was passed without difficulty into the bladder.  The urethra was open without evidence of stricture.  The ureteral orifices were orthotopic.  The bladder mucosa was evaluated using both antegrade and retroflex views and this showed no evidence of any lesions or trabeculation.  Scope was then removed and patient tolerated the procedure well.     10 total minutes was spent on " cystoscopy procedure alone.                 Assessment and Plan:     Assessment     58-year-old male with gross hematuria and unremarkable workup  LUTS  Hematospermia    We discussed Flomax as an option to treat his lower urine tract symptoms.  I am hoping that could address his groin and testicular pain as well.  There could be a component of prostatitis.  He has never had a PSA done before so we will screen for prostate cancer as well.      Plan  Start Flomax  Order PSA  Follow-up in 2 to 3 months to reassess urinary symptoms  Follow-up with [facility]'s      45 total minutes spent on the date of the encounter including direct interaction with the patient, performing chart review, documentation and further activities as noted above, exclusive of the time spent on performing cystoscopy.         Clifton Brown MD   Department of Urology   [facility]

## 2023-12-26 ENCOUNTER — TRANSFERRED RECORDS (OUTPATIENT)
Dept: HEALTH INFORMATION MANAGEMENT | Facility: CLINIC | Age: 58
End: 2023-12-26
Payer: COMMERCIAL

## 2024-01-16 ENCOUNTER — OFFICE VISIT (OUTPATIENT)
Dept: INTERNAL MEDICINE | Facility: CLINIC | Age: 59
End: 2024-01-16
Payer: COMMERCIAL

## 2024-01-16 VITALS
BODY MASS INDEX: 23.46 KG/M2 | DIASTOLIC BLOOD PRESSURE: 68 MMHG | HEIGHT: 67 IN | OXYGEN SATURATION: 98 % | RESPIRATION RATE: 16 BRPM | SYSTOLIC BLOOD PRESSURE: 118 MMHG | WEIGHT: 149.5 LBS | TEMPERATURE: 98.5 F | HEART RATE: 78 BPM

## 2024-01-16 DIAGNOSIS — R10.32 BILATERAL GROIN PAIN: ICD-10-CM

## 2024-01-16 DIAGNOSIS — N50.82 SCROTAL PAIN: Primary | ICD-10-CM

## 2024-01-16 DIAGNOSIS — N40.1 BENIGN PROSTATIC HYPERPLASIA WITH WEAK URINARY STREAM: ICD-10-CM

## 2024-01-16 DIAGNOSIS — R10.31 BILATERAL GROIN PAIN: ICD-10-CM

## 2024-01-16 DIAGNOSIS — R39.12 BENIGN PROSTATIC HYPERPLASIA WITH WEAK URINARY STREAM: ICD-10-CM

## 2024-01-16 DIAGNOSIS — R10.2 PELVIC PAIN IN MALE: ICD-10-CM

## 2024-01-16 DIAGNOSIS — N52.9 ERECTILE DYSFUNCTION, UNSPECIFIED ERECTILE DYSFUNCTION TYPE: ICD-10-CM

## 2024-01-16 LAB
ALBUMIN UR-MCNC: NEGATIVE MG/DL
APPEARANCE UR: CLEAR
BILIRUB UR QL STRIP: NEGATIVE
COLOR UR AUTO: YELLOW
GLUCOSE UR STRIP-MCNC: 100 MG/DL
HGB UR QL STRIP: NEGATIVE
KETONES UR STRIP-MCNC: NEGATIVE MG/DL
LEUKOCYTE ESTERASE UR QL STRIP: NEGATIVE
NITRATE UR QL: NEGATIVE
PH UR STRIP: 6 [PH] (ref 5–7)
SP GR UR STRIP: 1.01 (ref 1–1.03)
UROBILINOGEN UR STRIP-ACNC: 0.2 E.U./DL

## 2024-01-16 PROCEDURE — 99214 OFFICE O/P EST MOD 30 MIN: CPT | Performed by: INTERNAL MEDICINE

## 2024-01-16 PROCEDURE — 81003 URINALYSIS AUTO W/O SCOPE: CPT | Performed by: INTERNAL MEDICINE

## 2024-01-16 RX ORDER — TADALAFIL 5 MG/1
5 TABLET ORAL EVERY 24 HOURS
Qty: 90 TABLET | Refills: 3 | Status: SHIPPED | OUTPATIENT
Start: 2024-01-16

## 2024-01-16 RX ORDER — CIPROFLOXACIN 500 MG/1
500 TABLET, FILM COATED ORAL 2 TIMES DAILY
Qty: 84 TABLET | Refills: 0 | Status: SHIPPED | OUTPATIENT
Start: 2024-01-16 | End: 2024-02-27

## 2024-01-16 NOTE — PROGRESS NOTES
"  ASSESSMENT/PLAN                                                      (N50.82) Scrotal pain  (primary encounter diagnosis)  (R10.2) Pelvic pain in male  (N52.9) Erectile dysfunction, unspecified erectile dysfunction type  (R10.31,  R10.32) Bilateral groin pain  (N40.1,  R39.12) Benign prostatic hyperplasia with weak urinary stream  Plan:    - UA reflex today to reevaluate for any signs of inflammation or infection.   - CONTINUE Flomax daily consistently for now.   - START Cialis 5 mg daily and consistently for BPH and ED.   - START ciprofloxacin 500 mg twice daily for 6 weeks for possible prostatitis.   - ultrasound ordered for hernia evaluation - patient to schedule.    - if symptoms worsen, change, or do not improve, patient to contact MD.    Jeanine Corona MD   [address] [address]  T: [phone], F: [phone]    NARCISA Mills is a very pleasant 58 year old male who presents with ongoing pelvic, groin, and scrotal pain:    Accompanied by wife.    Symptoms have been ongoing for several weeks. Was seen by urology last month for these symptoms and some episodes of hematospermia. Urine demonstrated microscopic hematuria. CT urogram and cystoscopy unremarkable. Patient was started on Flomax for BPH (reported urinary hesitancy and dribbling on ROS). Patient reports some improvement in urinary symptoms with Flomax but continued weak stream, hesitancy, and dribbling.  He does report new onset erectile dysfunction over the last couple weeks. No nocturnal or morning erections.    No fevers or chills.  No urethral discharge.  GC/CT testing performed recently and negative.    OBJECTIVE                                                      /68   Pulse 78   Temp 98.5  F (36.9  C) (Temporal)   Resp 16   Ht 1.689 m (5' 6.5\")   Wt 67.8 kg (149 lb 8 oz)   SpO2 98%   BMI 23.77 kg/m    Constitutional: " well-appearing  Genitourinary: normal scrotum, testicles, and uncircumcised penis; small bulge palpated right inguinal canal with Valsalva maneuver  Psych: normal judgment and insight; normal mood and affect; recent and remote memory intact    ---    (Note documentation was completed, in part, with [employer] voice-recognition software. Documentation was reviewed, but some grammatical, spelling, and word errors may remain.)

## 2024-01-16 NOTE — PATIENT INSTRUCTIONS
Urine sample today.    CONTINUE Flomax 0.4mg daily and consistently.     START Cialis 5mg daily and consistently (for enlarged prostate and erectile dysfunction).    START Cipro twice daily x 6 weeks (extended course recommended for possible prostatitis).    Please call [phone] to schedule a hernia evaluation.

## 2024-01-17 DIAGNOSIS — R10.2 PELVIC PAIN IN MALE: ICD-10-CM

## 2024-01-17 RX ORDER — CIPROFLOXACIN 500 MG/1
500 TABLET, FILM COATED ORAL 2 TIMES DAILY
Qty: 84 TABLET | Refills: 0 | OUTPATIENT
Start: 2024-01-17

## 2024-01-17 NOTE — TELEPHONE ENCOUNTER
Pharmacy requested duplicate. Medication refused.     Script sent to the pharmacy yesterday (1/16/24). Script has an end date of 2/27/24: no further refills required.     Quin Ugalde RN

## 2024-01-27 ENCOUNTER — APPOINTMENT (OUTPATIENT)
Dept: CT IMAGING | Facility: CLINIC | Age: 59
End: 2024-01-27
Attending: EMERGENCY MEDICINE
Payer: COMMERCIAL

## 2024-01-27 ENCOUNTER — APPOINTMENT (OUTPATIENT)
Dept: ULTRASOUND IMAGING | Facility: CLINIC | Age: 59
End: 2024-01-27
Attending: EMERGENCY MEDICINE
Payer: COMMERCIAL

## 2024-01-27 ENCOUNTER — HOSPITAL ENCOUNTER (EMERGENCY)
Facility: CLINIC | Age: 59
Discharge: HOME | End: 2024-01-27
Attending: EMERGENCY MEDICINE | Admitting: EMERGENCY MEDICINE
Payer: COMMERCIAL

## 2024-01-27 VITALS
RESPIRATION RATE: 18 BRPM | HEART RATE: 72 BPM | OXYGEN SATURATION: 99 % | TEMPERATURE: 98.1 F | DIASTOLIC BLOOD PRESSURE: 85 MMHG | SYSTOLIC BLOOD PRESSURE: 146 MMHG

## 2024-01-27 DIAGNOSIS — R10.9 LEFT FLANK PAIN: ICD-10-CM

## 2024-01-27 DIAGNOSIS — K57.92 DIVERTICULITIS: ICD-10-CM

## 2024-01-27 DIAGNOSIS — N40.1 BENIGN PROSTATIC HYPERPLASIA WITH LOWER URINARY TRACT SYMPTOMS, SYMPTOM DETAILS UNSPECIFIED: ICD-10-CM

## 2024-01-27 DIAGNOSIS — N52.9 ERECTILE DYSFUNCTION, UNSPECIFIED ERECTILE DYSFUNCTION TYPE: ICD-10-CM

## 2024-01-27 LAB
ALBUMIN UR-MCNC: NEGATIVE MG/DL
ANION GAP SERPL CALCULATED.3IONS-SCNC: 10 MMOL/L (ref 7–15)
APPEARANCE UR: CLEAR
BASOPHILS # BLD AUTO: 0.1 10E3/UL (ref 0–0.2)
BASOPHILS NFR BLD AUTO: 1 %
BILIRUB UR QL STRIP: NEGATIVE
BUN SERPL-MCNC: 11.7 MG/DL (ref 6–20)
CALCIUM SERPL-MCNC: 8.8 MG/DL (ref 8.6–10)
CHLORIDE SERPL-SCNC: 102 MMOL/L (ref 98–107)
COLOR UR AUTO: ABNORMAL
CREAT SERPL-MCNC: 0.95 MG/DL (ref 0.67–1.17)
DEPRECATED HCO3 PLAS-SCNC: 24 MMOL/L (ref 22–29)
EGFRCR SERPLBLD CKD-EPI 2021: >90 ML/MIN/1.73M2
EOSINOPHIL # BLD AUTO: 0.2 10E3/UL (ref 0–0.7)
EOSINOPHIL NFR BLD AUTO: 3 %
ERYTHROCYTE [DISTWIDTH] IN BLOOD BY AUTOMATED COUNT: 12.7 % (ref 10–15)
GLUCOSE SERPL-MCNC: 115 MG/DL (ref 70–99)
GLUCOSE UR STRIP-MCNC: 300 MG/DL
HCT VFR BLD AUTO: 47.1 % (ref 40–53)
HGB BLD-MCNC: 15.8 G/DL (ref 13.3–17.7)
HGB UR QL STRIP: NEGATIVE
IMM GRANULOCYTES # BLD: 0 10E3/UL
IMM GRANULOCYTES NFR BLD: 0 %
KETONES UR STRIP-MCNC: NEGATIVE MG/DL
LEUKOCYTE ESTERASE UR QL STRIP: NEGATIVE
LYMPHOCYTES # BLD AUTO: 1.7 10E3/UL (ref 0.8–5.3)
LYMPHOCYTES NFR BLD AUTO: 20 %
MCH RBC QN AUTO: 29.9 PG (ref 26.5–33)
MCHC RBC AUTO-ENTMCNC: 33.5 G/DL (ref 31.5–36.5)
MCV RBC AUTO: 89 FL (ref 78–100)
MONOCYTES # BLD AUTO: 0.7 10E3/UL (ref 0–1.3)
MONOCYTES NFR BLD AUTO: 8 %
NEUTROPHILS # BLD AUTO: 6.1 10E3/UL (ref 1.6–8.3)
NEUTROPHILS NFR BLD AUTO: 68 %
NITRATE UR QL: NEGATIVE
NRBC # BLD AUTO: 0 10E3/UL
NRBC BLD AUTO-RTO: 0 /100
PH UR STRIP: 6.5 [PH] (ref 5–7)
PLATELET # BLD AUTO: 217 10E3/UL (ref 150–450)
POTASSIUM SERPL-SCNC: 3.8 MMOL/L (ref 3.4–5.3)
RBC # BLD AUTO: 5.28 10E6/UL (ref 4.4–5.9)
RBC URINE: 0 /HPF
SODIUM SERPL-SCNC: 136 MMOL/L (ref 135–145)
SP GR UR STRIP: 1 (ref 1–1.03)
UROBILINOGEN UR STRIP-MCNC: NORMAL MG/DL
WBC # BLD AUTO: 8.9 10E3/UL (ref 4–11)
WBC URINE: <1 /HPF

## 2024-01-27 PROCEDURE — 250N000011 HC RX IP 250 OP 636: Performed by: EMERGENCY MEDICINE

## 2024-01-27 PROCEDURE — 85025 COMPLETE CBC W/AUTO DIFF WBC: CPT | Performed by: EMERGENCY MEDICINE

## 2024-01-27 PROCEDURE — 36415 COLL VENOUS BLD VENIPUNCTURE: CPT | Performed by: EMERGENCY MEDICINE

## 2024-01-27 PROCEDURE — 74177 CT ABD & PELVIS W/CONTRAST: CPT

## 2024-01-27 PROCEDURE — 96374 THER/PROPH/DIAG INJ IV PUSH: CPT | Mod: 59

## 2024-01-27 PROCEDURE — 99285 EMERGENCY DEPT VISIT HI MDM: CPT | Mod: 25

## 2024-01-27 PROCEDURE — 76870 US EXAM SCROTUM: CPT

## 2024-01-27 PROCEDURE — 80048 BASIC METABOLIC PNL TOTAL CA: CPT | Performed by: EMERGENCY MEDICINE

## 2024-01-27 PROCEDURE — 81001 URINALYSIS AUTO W/SCOPE: CPT | Performed by: EMERGENCY MEDICINE

## 2024-01-27 RX ORDER — KETOROLAC TROMETHAMINE 15 MG/ML
15 INJECTION, SOLUTION INTRAMUSCULAR; INTRAVENOUS ONCE
Status: COMPLETED | OUTPATIENT
Start: 2024-01-27 | End: 2024-01-27

## 2024-01-27 RX ORDER — METRONIDAZOLE 500 MG/1
500 TABLET ORAL 2 TIMES DAILY
Qty: 14 TABLET | Refills: 0 | Status: SHIPPED | OUTPATIENT
Start: 2024-01-27 | End: 2024-02-03

## 2024-01-27 RX ORDER — IOPAMIDOL 755 MG/ML
500 INJECTION, SOLUTION INTRAVASCULAR ONCE
Status: COMPLETED | OUTPATIENT
Start: 2024-01-27 | End: 2024-01-27

## 2024-01-27 RX ADMIN — IOPAMIDOL 75 ML: 755 INJECTION, SOLUTION INTRAVENOUS at 17:08

## 2024-01-27 RX ADMIN — KETOROLAC TROMETHAMINE 15 MG: 15 INJECTION, SOLUTION INTRAMUSCULAR; INTRAVENOUS at 16:54

## 2024-01-27 ASSESSMENT — ACTIVITIES OF DAILY LIVING (ADL): ADLS_ACUITY_SCORE: 35

## 2024-01-27 NOTE — ED TRIAGE NOTES
Patient states he has had scrotal pain for 1 month that radiates into his lower abdomen. Patient states that he feels his scrotum hangs lower than it used to. Patient states he saw his primary and was suppose to have a ultrasound last week but was unable to make it to the ultrasound. Patient states the pain has gotten worse.      Triage Assessment (Adult)       Row Name 01/27/24 1321          Triage Assessment    Airway WDL WDL        Respiratory WDL    Respiratory WDL WDL        Skin Circulation/Temperature WDL    Skin Circulation/Temperature WDL WDL        Cardiac WDL    Cardiac WDL WDL        Peripheral/Neurovascular WDL    Peripheral Neurovascular WDL WDL        Cognitive/Neuro/Behavioral WDL    Cognitive/Neuro/Behavioral WDL WDL

## 2024-01-27 NOTE — ED PROVIDER NOTES
History     Chief Complaint:  Penis/Scrotum Problem       The history is provided by the patient.      Michael Mills is a 58 year old male with history of GERD, diverticulitis of sigmoid colon, and nephrolithiasis who presents to the ED for scrotum pain. The patient reports that for a month he has been experiencing constant burning scrotal pain, erectile inability, left flank pain, lower abdominal pain, and lower back pain. He states that it feels like his scrotum is hanging lower as well. He notes that he think he may have lifted heavy equipment wrong causing this injury. He reports that the pain is exacerbated with sitting down. He adds he is experiencing stressors at home with his spouse due to his symptoms. He states that he has a history of kidney stones. He denies recent nausea, dysuria, fever, vomiting, or seeing a urologist.     Independent Historian:   None - Patient Only    Review of External Notes:    I reviewed plan of care from 1/16 including ultrasound and 42 days of antibiotic for presumed prostate infection     Medications:    Albuterol inhaler  Ciprofloxacin  Fluticasone  Ibuprofen  Omeprazole  Plaquenil  Tadalafil  Tamsulosin    Past Medical History:    Diverticulitis of sigmoid colon  Hepatic hemangioma  Plantar warts  Premature ejaculation  RA (rheumatoid arthritis)  Varicose veins of lower extremities with complications  GERD (gastroesophageal reflux disease)   Nephrolithiasis  Hordeolum externum-recurrent   Venous insufficiency of leg     Past Surgical History:    Colonoscopy   Carpal tunnel release rt/lt   As repair anal fistula,rect adv flap   Varicose vein left side      Physical Exam   Patient Vitals for the past 24 hrs:   BP Temp Pulse Resp SpO2   01/27/24 1820 (!) 146/85 -- 72 -- --   01/27/24 1331 (!) 147/91 98.1  F (36.7  C) 75 18 99 %        Physical Exam  Abdomen: Mild tenderness in the left lower quadrant, no distention or rigidity.  Mild left CVA tenderness percussion    :  Uncircumcised, no significant scrotal edema, erythema, no breaks in the skin, no palpable hernia sac, no palpable abnormal testicular masses.  No abnormal testicular lie.    CV: ppi, regular   Resp: speaking in full sentences without any resp distress  Skin: warm dry well perfused  Neuro: Alert, no gross motor or sensory deficits,  gait stable      Emergency Department Course   Imaging:  Abd/pelvis CT,  IV  contrast only TRAUMA / AAA   Final Result   IMPRESSION:    1.  Findings suggestive of acute uncomplicated sigmoid diverticulitis.   2.  Hepatic steatosis.   3.  Diffuse circumferential wall thickening of the bladder likely related to chronic bladder obstruction/urinary retention from prostatomegaly. Superimposed infection is in the differential. Correlate with urinalysis if clinically indicated.      US Testicular & Scrotum w Doppler Ltd   Final Result   IMPRESSION:      1.  Normal testicular ultrasound. No evidence of testicular torsion, epididymitis, or other acute abnormality.         Laboratory:  Labs Ordered and Resulted from Time of ED Arrival to Time of ED Departure   ROUTINE UA WITH MICROSCOPIC REFLEX TO CULTURE - Abnormal       Result Value    Color Urine Straw      Appearance Urine Clear      Glucose Urine 300 (*)     Bilirubin Urine Negative      Ketones Urine Negative      Specific Gravity Urine 1.005      Blood Urine Negative      pH Urine 6.5      Protein Albumin Urine Negative      Urobilinogen Urine Normal      Nitrite Urine Negative      Leukocyte Esterase Urine Negative      RBC Urine 0      WBC Urine <1     BASIC METABOLIC PANEL - Abnormal    Sodium 136      Potassium 3.8      Chloride 102      Carbon Dioxide (CO2) 24      Anion Gap 10      Urea Nitrogen 11.7      Creatinine 0.95      GFR Estimate >90      Calcium 8.8      Glucose 115 (*)    CBC WITH PLATELETS AND DIFFERENTIAL    WBC Count 8.9      RBC Count 5.28      Hemoglobin 15.8      Hematocrit 47.1      MCV 89      MCH 29.9      MCHC 33.5       RDW 12.7      Platelet Count 217      % Neutrophils 68      % Lymphocytes 20      % Monocytes 8      % Eosinophils 3      % Basophils 1      % Immature Granulocytes 0      NRBCs per 100 WBC 0      Absolute Neutrophils 6.1      Absolute Lymphocytes 1.7      Absolute Monocytes 0.7      Absolute Eosinophils 0.2      Absolute Basophils 0.1      Absolute Immature Granulocytes 0.0      Absolute NRBCs 0.0        Emergency Department Course & Assessments:           Interventions:  Medications   ketorolac (TORADOL) injection 15 mg (15 mg Intravenous $Given 24 1654)   iopamidol (ISOVUE-370) solution 500 mL (75 mLs Intravenous $Given 24 1708)   sodium chloride (PF) 0.9% PF flush 100 mL (59 mLs Intravenous $Given 24 1708)          Independent Interpretation (X-rays, CTs, rhythm strip):      Assessments/Consultations/Discussion of Management or Tests:  ED Course as of 24   Sat 2024   1635 I obtained history and examined the patient as noted above.    180 I rechecked the patient and explained findings.        Social Determinants of Health affecting care:   None    Disposition:  The patient was discharged.     Impression & Plan    Medical Decision Makin-year-old gentleman here with left sided scrotal abdominal and flank pain.  Urinalysis shows no evidence of urinary tract infection.  He recently was placed on a prolonged course (42 days) ciprofloxacin for possible prostatitis.  CT scan done here given the flank and abdominal component to this pain which shows diverticulitis without complicating perforation or abscess in addition to prostatic hypertrophy.  Will add metronidazole to the Cipro he is already on he also likely would benefit from seeing urology given the prostatic hypertrophy and his descriptions of erectile dysfunction.  Ultrasound of the scrotum showed no evidence of epididymitis, hernia sac, torsion, abnormal testicular masses.      Diagnosis:    ICD-10-CM    1. Left  flank pain  R10.9       2. Diverticulitis  K57.92       3. Benign prostatic hyperplasia with lower urinary tract symptoms, symptom details unspecified  N40.1 Adult Urology  Referral      4. Erectile dysfunction, unspecified erectile dysfunction type  N52.9 Adult Urology  Referral           Discharge Medications:  Discharge Medication List as of 1/27/2024  6:12 PM        START taking these medications    Details   metroNIDAZOLE (FLAGYL) 500 MG tablet Take 1 tablet (500 mg) by mouth 2 times daily for 7 days, Disp-14 tablet, R-0, E-PrescribeEat yogurt or cottage cheese daily to prevent diarrhea that can be caused by taking this medication.                Scribe Disclosure:  I, Katey Mcclellan, am serving as a scribe at 5:06 PM on 1/27/2024 to document services personally performed by Mariano Coelho, * based on my observations and the provider's statements to me.     1/27/2024   Mariano Coelho, *        Mariano Coelho MD  01/27/24 2043

## 2024-03-21 ENCOUNTER — ANCILLARY PROCEDURE (OUTPATIENT)
Dept: GENERAL RADIOLOGY | Facility: CLINIC | Age: 59
End: 2024-03-21
Attending: FAMILY MEDICINE
Payer: COMMERCIAL

## 2024-03-21 ENCOUNTER — OFFICE VISIT (OUTPATIENT)
Dept: URGENT CARE | Facility: URGENT CARE | Age: 59
End: 2024-03-21
Payer: COMMERCIAL

## 2024-03-21 VITALS
TEMPERATURE: 97.5 F | HEIGHT: 67 IN | HEART RATE: 85 BPM | WEIGHT: 145 LBS | DIASTOLIC BLOOD PRESSURE: 74 MMHG | OXYGEN SATURATION: 97 % | BODY MASS INDEX: 22.76 KG/M2 | SYSTOLIC BLOOD PRESSURE: 109 MMHG

## 2024-03-21 DIAGNOSIS — S61.441A PUNCTURE WOUND OF RIGHT HAND WITH FOREIGN BODY, INITIAL ENCOUNTER: ICD-10-CM

## 2024-03-21 DIAGNOSIS — S61.441A PUNCTURE WOUND OF RIGHT HAND WITH FOREIGN BODY, INITIAL ENCOUNTER: Primary | ICD-10-CM

## 2024-03-21 PROCEDURE — 73130 X-RAY EXAM OF HAND: CPT | Mod: TC | Performed by: RADIOLOGY

## 2024-03-21 PROCEDURE — 99213 OFFICE O/P EST LOW 20 MIN: CPT | Performed by: FAMILY MEDICINE

## 2024-03-22 NOTE — PROGRESS NOTES
Assessment/  Plan:  Puncture wound of right hand with foreign body, initial encounter     - XR Hand Right G/E 3 Views; Future  - Orthopedic  Referral; Future  On x-ray no foreign body/ glass was identified    On probing the area with a blunt handle of a swab-  not able to identify a specific site of  foreign body-  he had a 1 x 1 cm thick scab over the site of the puncture where he had cut into the skin 1 week ago and dug out a piece of glass.  Not able to identify a hard mass under the skin-  there was no signs of purulence, no swelling under the scab    The wound appears to be healing-  no obvious sign of residual glass foreign body    Given referral to orthopedic hand surgery for further evaluation and if needed surgical removal of  residual glass in the hand,  but I was unable to identify site of glass.         _____________________________________________________________________________________    SUBJECTIVE:  Chief Complaint   Patient presents with    Urgent Care     Pt believes something stuck in his rt palmar hand from 4weeks ago.     Michael Mills is a 59 year old male who presents to the clinic for removal of  foreign body/ glass  in the skin of the right , palm/ base of the right hand      sustained 4 week(s) ago.    The splinter material is from a broken glass bottle  A week ago he had purulent drainage and swelling, so he cut into his skin, drained the pus and a piece of glass came out of the wound,  He thinks there may be another piece of glass, but he could not find it.    The wound has scabbed over without drainage     Associated symptoms: Denies numbness, weakness, or loss of function  Last tetanus booster within 10 years: yes    Past Medical History:   Diagnosis Date    CARDIOVASCULAR SCREENING; LDL GOAL LESS THAN 160 10/31/2010    6.4% 10 yr risk 2021    Diverticulitis of sigmoid colon 2019    also diverticula found on colonscopy in 8/2019    Hepatic hemangioma     Plantar warts  10/04/2013    Premature ejaculation     RA (rheumatoid arthritis) (H) 2014    SKIN SENSATION DISTURB 2006    rt  hand, cts  rt wrist     Snoring 10/04/2013    Varicose veins of lower extremities with complications 2006     Problem list name updated by automated process. Provider to review     Patient Active Problem List   Diagnosis    Varicose veins of lower extremities with complications    Premature ejaculation    CARDIOVASCULAR SCREENING; LDL GOAL LESS THAN 160    Snoring    Pain in limb    Plantar warts    RA (rheumatoid arthritis) (H)    Diverticulosis    History of nephrolithiasis    Varicose veins of other specified sites    Seropositive rheumatoid arthritis (H)       ALLERGIES:  No known drug allergy    MEDs  acetaminophen (TYLENOL) 500 MG tablet, Take 1,000 mg by mouth every 12 hours as needed for mild pain  albuterol (PROAIR HFA/PROVENTIL HFA/VENTOLIN HFA) 108 (90 Base) MCG/ACT inhaler, Inhale 2 puffs into the lungs every 6 hours as needed for wheezing  fluticasone (FLONASE) 50 MCG/ACT nasal spray, Spray 1 spray into both nostrils daily  ibuprofen (ADVIL/MOTRIN) 600 MG tablet, Take 1 tablet (600 mg) by mouth every 8 hours as needed for moderate pain  omeprazole (PRILOSEC) 20 MG DR capsule, Take 1 capsule (20 mg) by mouth daily  PLAQUENIL 200 MG tablet, Take 200 mg by mouth 2 times daily  tadalafil (CIALIS) 5 MG tablet, Take 1 tablet (5 mg) by mouth every 24 hours  tamsulosin (FLOMAX) 0.4 MG capsule, Take 1 capsule (0.4 mg) by mouth daily    No current facility-administered medications on file prior to visit.      Social History     Tobacco Use    Smoking status: Never     Passive exposure: Never    Smokeless tobacco: Never   Substance Use Topics    Alcohol use: Yes     Comment: socially, 4-5 drinks on the weekend       Family History   Problem Relation Age of Onset    Thyroid Disease Mother         underactive     Hypertension Mother     Cerebrovascular Disease Father 67         of  "complications of stroke    Heart Disease Father     Family History Negative Sister         1    Cancer Brother         1- at age of 37-from cancer ?origin     Myocardial Infarction Brother 51    Family History Negative Maternal Grandmother     Myocardial Infarction Maternal Grandfather     Family History Negative Paternal Grandmother     Family History Negative Paternal Grandfather     Myocardial Infarction Maternal Uncle     Cancer - colorectal No family hx of     Prostate Cancer No family hx of          ROS:  CONSTITUTIONAL:NEGATIVE for fever, chills,    EYES: NEGATIVE for vision changes or irritation  ENT/MOUTH: NEGATIVE for ear, mouth and throat problems  RESP:NEGATIVE for significant cough or SOB        EXAM:      VITALS: /74   Pulse 85   Temp 97.5  F (36.4  C) (Tympanic)   Ht 1.702 m (5' 7\")   Wt 65.8 kg (145 lb)   SpO2 97%   BMI 22.71 kg/m      GENERAL: alert, mild distress  SKIN: Foreign body location:  right  base of the palm-  has a 1 x 1 cm thick scab where he had cut into the skin,  no purulence.  No significant swelling   Foreign body not able to be visualized-  -  with pressing on the adjacent skin with the blunt handle of a swab-  no solid mass inside the skin was noted.   He does have pain with local pressure to the area he had cut open the skin, but no purulent or serous drainage is expressed.  No pain at the wound site with flexion or extension of the fingers  Tendon function intact: yes  Sensation to light touch intact: yes  CV: Pulses intact: yes     EYES: EOMI,   conjunctiva clear  HENT: External ears with no swelling or lesions   Nose and lips without  Swelling, ulcers, erythema or lesions  NECK: normal pain free ROM  RESP: no labored respirations, no tachypnea  EXTREMITIES:   Full ROM without expression of pain or limitation x 4 extremities  NEURO: Normal strength and tone, ambulation without difficulty,   normal speech and mentation           X-ray was performed with no " foreign body   visualized.   x-ray read by me Lucie Santana MD     Results for orders placed or performed in visit on 03/21/24   XR Hand Right G/E 3 Views     Status: None    Narrative    XR HAND RIGHT G/E 3 VIEWS   3/21/2024 4:05 PM     HISTORY:  Puncture wound of right hand with foreign body, initial  encounter    Comparison: None.      Impression    IMPRESSION: Normal bones, joint spaces and alignment. There is no  evidence of radiopaque foreign body or osteomyelitis. No fracture.    SHASHI KAMINSKI MD         SYSTEM ID:  [id number]

## 2024-05-02 ENCOUNTER — OFFICE VISIT (OUTPATIENT)
Dept: FAMILY MEDICINE | Facility: CLINIC | Age: 59
End: 2024-05-02
Payer: COMMERCIAL

## 2024-05-02 VITALS
OXYGEN SATURATION: 99 % | WEIGHT: 152 LBS | SYSTOLIC BLOOD PRESSURE: 120 MMHG | BODY MASS INDEX: 24.43 KG/M2 | TEMPERATURE: 96.9 F | DIASTOLIC BLOOD PRESSURE: 79 MMHG | RESPIRATION RATE: 18 BRPM | HEIGHT: 66 IN | HEART RATE: 62 BPM

## 2024-05-02 DIAGNOSIS — Z00.00 WELL ADULT EXAM: ICD-10-CM

## 2024-05-02 DIAGNOSIS — K21.00 GASTROESOPHAGEAL REFLUX DISEASE WITH ESOPHAGITIS WITHOUT HEMORRHAGE: ICD-10-CM

## 2024-05-02 DIAGNOSIS — R39.9 LOWER URINARY TRACT SYMPTOMS (LUTS): ICD-10-CM

## 2024-05-02 DIAGNOSIS — Z13.220 LIPID SCREENING: ICD-10-CM

## 2024-05-02 DIAGNOSIS — Z13.228 SCREENING FOR METABOLIC DISORDER: ICD-10-CM

## 2024-05-02 DIAGNOSIS — J30.2 SEASONAL ALLERGIC RHINITIS, UNSPECIFIED TRIGGER: ICD-10-CM

## 2024-05-02 DIAGNOSIS — R35.0 URINARY FREQUENCY: ICD-10-CM

## 2024-05-02 DIAGNOSIS — F41.9 ANXIETY: ICD-10-CM

## 2024-05-02 PROCEDURE — 99214 OFFICE O/P EST MOD 30 MIN: CPT | Mod: 25 | Performed by: FAMILY MEDICINE

## 2024-05-02 PROCEDURE — 99396 PREV VISIT EST AGE 40-64: CPT | Performed by: FAMILY MEDICINE

## 2024-05-02 RX ORDER — NABUMETONE 500 MG/1
1 TABLET, FILM COATED ORAL
COMMUNITY
Start: 2024-04-05

## 2024-05-02 RX ORDER — FLUTICASONE PROPIONATE 50 MCG
1 SPRAY, SUSPENSION (ML) NASAL DAILY
Qty: 16 G | Refills: 1 | Status: SHIPPED | OUTPATIENT
Start: 2024-05-02

## 2024-05-02 RX ORDER — ESCITALOPRAM OXALATE 5 MG/1
5 TABLET ORAL DAILY
Qty: 90 TABLET | Refills: 1 | Status: SHIPPED | OUTPATIENT
Start: 2024-05-02

## 2024-05-02 RX ORDER — TAMSULOSIN HYDROCHLORIDE 0.4 MG/1
0.4 CAPSULE ORAL DAILY
Qty: 90 CAPSULE | Refills: 3 | Status: SHIPPED | OUTPATIENT
Start: 2024-05-02

## 2024-05-02 SDOH — HEALTH STABILITY: PHYSICAL HEALTH: ON AVERAGE, HOW MANY MINUTES DO YOU ENGAGE IN EXERCISE AT THIS LEVEL?: 40 MIN

## 2024-05-02 SDOH — HEALTH STABILITY: PHYSICAL HEALTH: ON AVERAGE, HOW MANY DAYS PER WEEK DO YOU ENGAGE IN MODERATE TO STRENUOUS EXERCISE (LIKE A BRISK WALK)?: 2 DAYS

## 2024-05-02 ASSESSMENT — SOCIAL DETERMINANTS OF HEALTH (SDOH)
HOW OFTEN DO YOU ATTENT MEETINGS OF THE CLUB OR ORGANIZATION YOU BELONG TO?: NEVER
HOW OFTEN DO YOU ATTEND CHURCH OR RELIGIOUS SERVICES?: MORE THAN 4 TIMES PER YEAR
HOW OFTEN DO YOU GET TOGETHER WITH FRIENDS OR RELATIVES?: ONCE A WEEK
IN A TYPICAL WEEK, HOW MANY TIMES DO YOU TALK ON THE PHONE WITH FAMILY, FRIENDS, OR NEIGHBORS?: TWICE A WEEK
HOW OFTEN DO YOU GET TOGETHER WITH FRIENDS OR RELATIVES?: ONCE A WEEK
DO YOU BELONG TO ANY CLUBS OR ORGANIZATIONS SUCH AS CHURCH GROUPS UNIONS, FRATERNAL OR ATHLETIC GROUPS, OR SCHOOL GROUPS?: NO

## 2024-05-02 ASSESSMENT — LIFESTYLE VARIABLES
HOW OFTEN DO YOU HAVE A DRINK CONTAINING ALCOHOL: 2-4 TIMES A MONTH
HOW OFTEN DO YOU HAVE SIX OR MORE DRINKS ON ONE OCCASION: WEEKLY
SKIP TO QUESTIONS 9-10: 0
AUDIT-C TOTAL SCORE: 5
HOW MANY STANDARD DRINKS CONTAINING ALCOHOL DO YOU HAVE ON A TYPICAL DAY: 1 OR 2

## 2024-05-02 NOTE — PROGRESS NOTES
Preventive Care Visit  [facility]  Flory Dominguez MD, Family Medicine  May 2, 2024      Assessment & Plan     (Z00.00) Well adult exam  Comment: Discussed healthy eating   Discussed maintaining ideal weight   Discussed regular exercise .  Was diagnosed with RA   Now discontinued medication .   Discussed to follow up with rheumatologist .  Discussed cutting down on alcohol .    (J30.2) Seasonal allergic rhinitis, unspecified trigger  Comment:   Plan: fluticasone (FLONASE) 50 MCG/ACT nasal spray         (Z13.228) Screening for metabolic disorder  Comment:   Plan: Comprehensive metabolic panel (BMP + Alb, Alk         Phos, ALT, AST, Total. Bili, TP)          (Z13.220) Lipid screening  Comment:   Plan: Lipid panel reflex to direct LDL Fasting            (F41.9) Anxiety  Comment:   Plan: escitalopram (LEXAPRO) 5 MG tablet        Discussed medication side effects .  Recommend to contact with any concern .    (K21.00) Gastroesophageal reflux disease with esophagitis without hemorrhage  Comment:   Plan: omeprazole (PRILOSEC) 20 MG DR capsule            (R35.0) Urinary frequency  Comment:   Plan: UA with Microscopic reflex to Culture - lab         collect            (R39.9) Lower urinary tract symptoms (LUTS)  Comment:   Plan: tamsulosin (FLOMAX) 0.4 MG capsule     Recommend to contact if symptoms fail to improve .             Counseling  Appropriate preventive services were discussed with this patient, including applicable screening as appropriate for fall prevention, nutrition, physical activity, Tobacco-use cessation, weight loss and cognition.  Checklist reviewing preventive services available has been given to the patient.      Pancho Rodriguez is a 59 year old, presenting for the following:  Physical        5/2/2024     3:52 PM   Additional Questions   Roomed by Laurie   Accompanied by wife Yadi        Health Care Directive  Patient does not have a Health Care Directive or Living Will:      HPI  Discuss healthy eating     Want to discuss cholesterol        5/2/2024   General Health   How would you rate your overall physical health? (!) FAIR   Feel stress (tense, anxious, or unable to sleep) Only a little    Not at all   (!) STRESS CONCERN      5/2/2024   Nutrition   Three or more servings of calcium each day? Yes   Diet: Regular (no restrictions)   How many servings of fruit and vegetables per day? (!) 2-3   How many sweetened beverages each day? 0-1         5/2/2024   Exercise   Days per week of moderate/strenous exercise 2 days    2 days   Average minutes spent exercising at this level 40 min    40 min   (!) EXERCISE CONCERN      5/2/2024   Social Factors   Frequency of gathering with friends or relatives Once a week    Once a week   Worry food won't last until get money to buy more No    Patient declined   Food not last or not have enough money for food? No    Patient declined   Do you have housing?  No    Patient declined   Are you worried about losing your housing? No    Patient declined   Lack of transportation? No    No   Unable to get utilities (heat,electricity)? No    Patient declined   (!) HOUSING CONCERN PRESENT      5/2/2024   Fall Risk   Fallen 2 or more times in the past year? No   Trouble with walking or balance? No          5/2/2024   Dental   Dentist two times every year? Yes         5/2/2024   TB Screening   Were you born outside of the [place]? Yes             5/2/2024   Substance Use   Frequency of drinking alcohol? 2-4 times a month   Alcohol more than 3/day or more than 7/wk Not Applicable   Do you use any other substances recreationally? No     Social History     Tobacco Use    Smoking status: Never     Passive exposure: Never    Smokeless tobacco: Never   Vaping Use    Vaping status: Never Used   Substance Use Topics    Alcohol use: Yes     Comment: socially, 4-5 drinks on the weekend    Drug use: No           5/2/2024   STI Screening   New sexual partner(s) since last STI/HIV  test? No   Last PSA:   PSA Tumor Marker   Date Value Ref Range Status   12/14/2023 2.12 0.00 - 3.50 ng/mL Final     ASCVD Risk   The 10-year ASCVD risk score (Irina DAVIS, et al., 2019) is: 7%    Values used to calculate the score:      Age: 59 years      Sex: Male      Is Non- : No      Diabetic: No      Tobacco smoker: No      Systolic Blood Pressure: 120 mmHg      Is BP treated: No      HDL Cholesterol: 40 mg/dL      Total Cholesterol: 194 mg/dL         Reviewed and updated as needed this visit by Provider                    Past Medical History:   Diagnosis Date    CARDIOVASCULAR SCREENING; LDL GOAL LESS THAN 160 10/31/2010    6.4% 10 yr risk 2021    Diverticulitis of sigmoid colon 2019    also diverticula found on colonscopy in 8/2019    Hepatic hemangioma     Plantar warts 10/04/2013    Premature ejaculation     RA (rheumatoid arthritis) (H) 12/31/2014    SKIN SENSATION DISTURB 12/25/2006    rt  hand, cts  rt wrist     Snoring 10/04/2013    Varicose veins of lower extremities with complications 12/25/2006     Problem list name updated by automated process. Provider to review     Past Surgical History:   Procedure Laterality Date    AS REPAIR  ANAL FISTULA,RECT ADV FLAP  2008    Dr. Blair Bishop    CARPAL TUNNEL RELEASE RT/LT  2019    both sides    COLONOSCOPY N/A 8/16/2019    [facility] - repeat in 10 years    varicose vein left side           Review of Systems  CONSTITUTIONAL: NEGATIVE for fever, chills, change in weight  INTEGUMENTARY/SKIN: NEGATIVE for worrisome rashes, moles or lesions  EYES: NEGATIVE for vision changes or irritation  ENT/MOUTH: NEGATIVE for ear, mouth and throat problems  RESP: NEGATIVE for significant cough or SOB  BREAST: NEGATIVE for masses, tenderness or discharge  CV: NEGATIVE for chest pain, palpitations or peripheral edema  GI: NEGATIVE for nausea, abdominal pain, heartburn, or change in bowel habits  : NEGATIVE for frequency, dysuria, or  "hematuria  Nighttime urination     MUSCULOSKELETAL: NEGATIVE for significant arthralgias or myalgia  NEURO: NEGATIVE for weakness, dizziness or paresthesias  ENDOCRINE: NEGATIVE for temperature intolerance, skin/hair changes  HEME: NEGATIVE for bleeding problems  PSYCHIATRIC: NEGATIVE for changes in mood or affect     Objective    Exam  /79   Pulse 62   Temp 96.9  F (36.1  C) (Tympanic)   Resp 18   Ht 1.683 m (5' 6.25\")   Wt 68.9 kg (152 lb)   SpO2 99%   BMI 24.35 kg/m     Estimated body mass index is 24.35 kg/m  as calculated from the following:    Height as of this encounter: 1.683 m (5' 6.25\").    Weight as of this encounter: 68.9 kg (152 lb).    Physical Exam  GENERAL: alert and no distress  EYES: Eyes grossly normal to inspection, PERRL and conjunctivae and sclerae normal  HENT: ear canals and TM's normal, nose and mouth without ulcers or lesions  NECK: no adenopathy, no asymmetry, masses, or scars  RESP: lungs clear to auscultation - no rales, rhonchi or wheezes  CV: regular rate and rhythm, normal S1 S2, no S3 or S4, no murmur, click or rub, no peripheral edema  ABDOMEN: soft, nontender, no hepatosplenomegaly, no masses and bowel sounds normal  MS: no gross musculoskeletal defects noted, no edema  SKIN: no suspicious lesions or rashes  NEURO: Normal strength and tone, mentation intact and speech normal  PSYCH: mentation appears normal, affect normal/bright        Signed Electronically by: Flory Dominguez MD    "

## 2024-05-02 NOTE — COMMUNITY RESOURCES LIST (ENGLISH)
May 2, 2024           YOUR PERSONALIZED LIST OF SERVICES & PROGRAMS           & SHELTER    Housing      [facility] - Shelter for individuals  [address] [place] [address] (Distance: 8.9 miles)  Language: English, Ukrainian  Fee: Free      [facility] - [facility]  [address] [facility] [place] [address] (Distance: 14.1 miles)  Phone: [phone]) [phone]  Website: http://www.[facility]  Language: English  Fee: Free, Sliding scale  Accessibility: Ada accessible      [facility] [employer] [facility]  Phone: [phone]  Website: https://www.[employer]/  Language: English, Hmong, Oromo, Ecuadorean, Ukrainian  Hours: Mon 9:00 AM - 5:00 PM Tue 9:00 AM - 5:00 PM Wed 9:00 AM - 5:00 PM Thu 9:00 AM - 5:00 PM Fri 9:00 AM - 5:00 PM  Fee: Insurance  Accessibility: Blind accommodation, Deaf or hard of hearing, Translation services  Transportation Options: Free transportation    Case Management      [facility] search assistance  [address] [place] [address] (Distance: 18.4 miles)  Phone: [phone]  Language: English  Fee: Free, Sliding scale, Insurance  Accessibility: Translation services, Ada accessible, Blind accommodation, Deaf or hard of hearing      [facility] - Housing search assistance  [address] [place] [address] (Distance: 10.1 miles)  Phone: [phone]) [phone]  Language: English  Fee: Sliding scale, Self pay      [facility] - Housing Stabilization Services  Phone: [phone]) [phone]  Website: https://[employer].b3 bio/  Language: English  Hours: Mon 8:00 AM - 4:00 PM Tue 8:00 AM - 4:00 PM Wed 8:00 AM - 4:00 PM Thu 8:00 AM - 4:00 PM Fri 8:00 AM - 4:00 PM  Fee: Free  Accessibility: Blind accommodation, Deaf or hard of hearing  Transportation Options: Free transportation    Drop-In Services      [facility] [employer]  [address] [place] [address]  [place] [address] (Distance: 23.8 miles)  Phone: [phone]) [phone]  Language: English  Fee: Free      [facility] - Warming or cooling center [facility]  [address] [place] [address] (Distance: 6.6 miles)  Language: English, Solomon Islander, Cambodian  Fee: Free      [facility]  Website: http://www.laundrylove.org        USE    Use Treatment      [facility]  [address] [place] [address] (Distance: 6.6 miles)  Language: English, Solomon Islander, Burkinan, Faroese  Fee: Free  Accessibility: Ada accessible, Translation services      [facility]  Phone: [phone]) [phone]  Website: https://Debteye/programs-services/mental-health/counseling-services/  Language: English  Hours: Mon 8:00 AM - 5:00 PM Tue 8:00 AM - 5:00 PM Wed 8:00 AM - 5:00 PM Thu 8:00 AM - 5:00 PM Fri 8:00 AM - 5:00 PM  Fee: Insurance, Self pay  Accessibility: Ada accessible, Deaf or hard of hearing, Translation services      [facility]  Phone: [phone]) [phone]  Website: https://www.[facility]/  Language: English  Hours: Mon 7:00 AM - 7:00 PM Tue 7:00 AM - 7:00 PM Wed 7:00 AM - 7:00 PM Thu 7:00 AM - 7:00 PM Fri 7:00 AM - 7:00 PM  Fee: Free    Use Recovery Support      [facility] Support [facility]  Phone: [phone]) [phone]  Website: https://mnwitw.org/vpsnhm  Language: English  Hours: Mon 9:00 AM - 5:00 PM Tue 9:00 AM - 5:00 PM Wed 9:00 AM - 5:00 PM Thu 9:00 AM - 5:00 PM Fri 9:00 AM - 5:00 PM  Fee: Free  Accessibility: Ada accessible, Translation services      [facility] Substance Use Disorder Programs ([facility])  Phone: ([phone]) [phone]  Website: https://Debteye/programs-services/addiction-treatment/  Language: English  Hours: Mon 8:00 AM - 5:00 PM Tue 8:00 AM - 5:00 PM Wed 8:00 AM - 5:00 PM Thu 8:00 AM - 5:00 PM Fri 8:00 AM - 5:00 PM  Fee: Insurance, Self pay  Accessibility: Ada  accessible      [facility]  Phone: [phone]) [phone]  Website: https://[employer]omend/programs-services/mental-health/counseling-services/  Language: English  Hours: Mon 8:00 AM - 5:00 PM Tue 8:00 AM - 5:00 PM Wed 8:00 AM - 5:00 PM Thu 8:00 AM - 5:00 PM Fri 8:00 AM - 5:00 PM  Fee: Insurance, Self pay  Accessibility: Ada accessible, Deaf or hard of hearing, Translation services               IMPORTANT NUMBERS & WEBSITES        Emergency Services  911  .   [facility]  211 http://211unitedway.org  .   Poison Control  (339) 969-6241 http://[place]poison.org http://wis[place]poison.org  .     Suicide and Crisis Lifeline  988 http://988Bebitosline.org  .   [facility] [place] Child Abuse Hotline  788.693.7423 http://Childhelphotline.org   .   [place] Sexual Assault Hotline  (699) 580-8577 (HOPE) http://GoGuide.PerSay   .     [place] Runaway Safeline  (694) 189-8050 (RUNAWAY) http://Dyn.PerSay  .   Pregnancy & Postpartum Support  Call/text 389-568-2738  [place]: http://ppsupportmn.org  [place]: http://ADMETA.com/wi  .   Substance Abuse National Helpline ([facility])  153-148-HELP (8355) http://Findtreatment.gov   .                DISCLAIMER: These resources have been generated via the [employer] Platform. [employer] does not endorse any service providers mentioned in this resource list. [employer] does not guarantee that the services mentioned in this resource list will be available to you or will improve your health or wellness.    [facility]

## 2024-05-10 ENCOUNTER — LAB (OUTPATIENT)
Dept: LAB | Facility: CLINIC | Age: 59
End: 2024-05-10
Payer: COMMERCIAL

## 2024-05-10 DIAGNOSIS — R35.0 URINARY FREQUENCY: ICD-10-CM

## 2024-05-10 DIAGNOSIS — Z13.228 SCREENING FOR METABOLIC DISORDER: ICD-10-CM

## 2024-05-10 DIAGNOSIS — Z13.220 LIPID SCREENING: ICD-10-CM

## 2024-05-10 LAB
ALBUMIN SERPL BCG-MCNC: 4 G/DL (ref 3.5–5.2)
ALBUMIN UR-MCNC: NEGATIVE MG/DL
ALP SERPL-CCNC: 67 U/L (ref 40–150)
ALT SERPL W P-5'-P-CCNC: 22 U/L (ref 0–70)
ANION GAP SERPL CALCULATED.3IONS-SCNC: 9 MMOL/L (ref 7–15)
APPEARANCE UR: CLEAR
AST SERPL W P-5'-P-CCNC: 25 U/L (ref 0–45)
BILIRUB SERPL-MCNC: 0.6 MG/DL
BILIRUB UR QL STRIP: NEGATIVE
BUN SERPL-MCNC: 12.4 MG/DL (ref 6–20)
CALCIUM SERPL-MCNC: 9 MG/DL (ref 8.6–10)
CHLORIDE SERPL-SCNC: 105 MMOL/L (ref 98–107)
CHOLEST SERPL-MCNC: 206 MG/DL
COLOR UR AUTO: YELLOW
CREAT SERPL-MCNC: 1.06 MG/DL (ref 0.67–1.17)
DEPRECATED HCO3 PLAS-SCNC: 24 MMOL/L (ref 22–29)
EGFRCR SERPLBLD CKD-EPI 2021: 82 ML/MIN/1.73M2
FASTING STATUS PATIENT QL REPORTED: YES
FASTING STATUS PATIENT QL REPORTED: YES
GLUCOSE SERPL-MCNC: 100 MG/DL (ref 70–99)
GLUCOSE UR STRIP-MCNC: NEGATIVE MG/DL
HDLC SERPL-MCNC: 44 MG/DL
HGB UR QL STRIP: NEGATIVE
KETONES UR STRIP-MCNC: NEGATIVE MG/DL
LDLC SERPL CALC-MCNC: 149 MG/DL
LEUKOCYTE ESTERASE UR QL STRIP: NEGATIVE
NITRATE UR QL: NEGATIVE
NONHDLC SERPL-MCNC: 162 MG/DL
PH UR STRIP: 7 [PH] (ref 5–7)
POTASSIUM SERPL-SCNC: 4.5 MMOL/L (ref 3.4–5.3)
PROT SERPL-MCNC: 7 G/DL (ref 6.4–8.3)
RBC #/AREA URNS AUTO: NORMAL /HPF
SODIUM SERPL-SCNC: 138 MMOL/L (ref 135–145)
SP GR UR STRIP: 1.01 (ref 1–1.03)
TRIGL SERPL-MCNC: 66 MG/DL
UROBILINOGEN UR STRIP-ACNC: 0.2 E.U./DL
WBC #/AREA URNS AUTO: NORMAL /HPF

## 2024-05-10 PROCEDURE — 80053 COMPREHEN METABOLIC PANEL: CPT

## 2024-05-10 PROCEDURE — 81001 URINALYSIS AUTO W/SCOPE: CPT

## 2024-05-10 PROCEDURE — 80061 LIPID PANEL: CPT

## 2024-05-10 PROCEDURE — 36415 COLL VENOUS BLD VENIPUNCTURE: CPT

## 2024-05-20 ENCOUNTER — TELEPHONE (OUTPATIENT)
Dept: FAMILY MEDICINE | Facility: CLINIC | Age: 59
End: 2024-05-20
Payer: COMMERCIAL

## 2024-05-20 ENCOUNTER — MYC MEDICAL ADVICE (OUTPATIENT)
Dept: FAMILY MEDICINE | Facility: CLINIC | Age: 59
End: 2024-05-20

## 2024-05-20 NOTE — TELEPHONE ENCOUNTER
Received fax from pt's pharmacy. Fluticasone is not covered but Mometasone and Flunisolid are covered.   Per Dr. Dominguez writer called patient to ask if they were ok with just purchasing Fluticasone 50 mcb nasal spary over the counter as it is likely cheaper than the other 2.  Writer called patient left  to call clinic. Writer sent my chart message as well.

## 2024-05-22 NOTE — TELEPHONE ENCOUNTER
Patient has not read Snackr message, call to patient Outreach number 3. Can wait another day or two to see if patient reads message. Left another message to call back or check Snackr message. Kitty Osullivan R.N.

## 2024-06-06 ENCOUNTER — TRANSFERRED RECORDS (OUTPATIENT)
Dept: HEALTH INFORMATION MANAGEMENT | Facility: CLINIC | Age: 59
End: 2024-06-06
Payer: COMMERCIAL

## 2024-08-20 ENCOUNTER — TRANSFERRED RECORDS (OUTPATIENT)
Dept: HEALTH INFORMATION MANAGEMENT | Facility: CLINIC | Age: 59
End: 2024-08-20
Payer: COMMERCIAL

## 2024-08-20 DIAGNOSIS — M05.79 SEROPOSITIVE RHEUMATOID ARTHRITIS OF MULTIPLE JOINTS (H): Primary | ICD-10-CM

## 2024-08-20 DIAGNOSIS — Z71.89 OTHER SPECIFIED COUNSELING: Chronic | ICD-10-CM

## 2024-08-20 LAB
ALT SERPL-CCNC: 16 IU/L (ref 9–46)
AST SERPL-CCNC: 22 U/L (ref 10–40)
CREATININE (EXTERNAL): 0.9 MG/DL (ref 0.6–1.35)

## 2024-12-09 DIAGNOSIS — J30.2 SEASONAL ALLERGIC RHINITIS, UNSPECIFIED TRIGGER: ICD-10-CM

## 2024-12-09 RX ORDER — FLUTICASONE PROPIONATE 50 MCG
1 SPRAY, SUSPENSION (ML) NASAL DAILY
Qty: 16 G | Refills: 1 | Status: SHIPPED | OUTPATIENT
Start: 2024-12-09

## 2024-12-12 ENCOUNTER — ALLIED HEALTH/NURSE VISIT (OUTPATIENT)
Dept: FAMILY MEDICINE | Facility: CLINIC | Age: 59
End: 2024-12-12
Payer: COMMERCIAL

## 2024-12-12 DIAGNOSIS — Z11.1 MANTOUX NEGATIVE: Primary | ICD-10-CM

## 2024-12-12 NOTE — PROGRESS NOTES
Mantoux read today. Was placed at [facility] in [place], [address] [place] [address] aimee. [phone]    0mm negative read on 12-12-24    Was given on 12-10-24 right forearm at 1 pm     Was faxed to [phone] and copy sent abstract. Original with the patient     Phone [phone]    Jas Cohen CMA

## 2024-12-31 ENCOUNTER — APPOINTMENT (OUTPATIENT)
Dept: LAB | Facility: CLINIC | Age: 59
End: 2024-12-31
Payer: COMMERCIAL

## 2025-06-14 ENCOUNTER — HEALTH MAINTENANCE LETTER (OUTPATIENT)
Age: 60
End: 2025-06-14

## 2025-06-19 DIAGNOSIS — Z79.899 HIGH RISK MEDICATION USE: Primary | ICD-10-CM

## (undated) DEVICE — KIT ENDO TURNOVER/PROCEDURE W/CLEAN A SCOPE LINERS 103888

## (undated) RX ORDER — FENTANYL CITRATE 50 UG/ML
INJECTION, SOLUTION INTRAMUSCULAR; INTRAVENOUS
Status: DISPENSED
Start: 2019-08-16